# Patient Record
Sex: FEMALE | Race: WHITE | NOT HISPANIC OR LATINO | Employment: FULL TIME | URBAN - METROPOLITAN AREA
[De-identification: names, ages, dates, MRNs, and addresses within clinical notes are randomized per-mention and may not be internally consistent; named-entity substitution may affect disease eponyms.]

---

## 2019-10-26 ENCOUNTER — HOSPITAL ENCOUNTER (OUTPATIENT)
Dept: URGENT CARE | Facility: CLINIC | Age: 34
Discharge: HOME | End: 2019-10-26

## 2019-10-28 LAB — BACTERIA SPEC AEROBE CULT: NORMAL

## 2021-08-16 ENCOUNTER — OFFICE VISIT (OUTPATIENT)
Dept: INTERNAL MEDICINE | Facility: CLINIC | Age: 36
End: 2021-08-16

## 2021-08-16 VITALS
TEMPERATURE: 98 F | HEIGHT: 65 IN | BODY MASS INDEX: 48.82 KG/M2 | SYSTOLIC BLOOD PRESSURE: 126 MMHG | OXYGEN SATURATION: 98 % | HEART RATE: 71 BPM | WEIGHT: 293 LBS | DIASTOLIC BLOOD PRESSURE: 72 MMHG

## 2021-08-16 DIAGNOSIS — Z13.220 SCREENING, LIPID: ICD-10-CM

## 2021-08-16 DIAGNOSIS — R73.01 IMPAIRED FASTING GLUCOSE: ICD-10-CM

## 2021-08-16 DIAGNOSIS — R06.02 SHORTNESS OF BREATH: ICD-10-CM

## 2021-08-16 DIAGNOSIS — Z12.4 SCREENING FOR CERVICAL CANCER: ICD-10-CM

## 2021-08-16 DIAGNOSIS — Z76.89 ESTABLISHING CARE WITH NEW DOCTOR, ENCOUNTER FOR: Primary | ICD-10-CM

## 2021-08-16 DIAGNOSIS — E66.01 MORBID (SEVERE) OBESITY DUE TO EXCESS CALORIES (HCC): ICD-10-CM

## 2021-08-16 LAB
25(OH)D3 SERPL-MCNC: 31 NG/ML
ALBUMIN SERPL-MCNC: 4.4 G/DL (ref 3.5–5.2)
ALBUMIN/GLOB SERPL: 1.5 G/DL
ALP SERPL-CCNC: 77 U/L (ref 39–117)
ALT SERPL W P-5'-P-CCNC: 14 U/L (ref 1–33)
ANION GAP SERPL CALCULATED.3IONS-SCNC: 9.7 MMOL/L (ref 5–15)
AST SERPL-CCNC: 14 U/L (ref 1–32)
BASOPHILS # BLD AUTO: 0.07 10*3/MM3 (ref 0–0.2)
BASOPHILS NFR BLD AUTO: 0.8 % (ref 0–1.5)
BILIRUB SERPL-MCNC: <0.2 MG/DL (ref 0–1.2)
BUN SERPL-MCNC: 14 MG/DL (ref 6–20)
BUN/CREAT SERPL: 32.6 (ref 7–25)
CALCIUM SPEC-SCNC: 9.4 MG/DL (ref 8.6–10.5)
CHLORIDE SERPL-SCNC: 100 MMOL/L (ref 98–107)
CHOLEST SERPL-MCNC: 194 MG/DL (ref 0–200)
CO2 SERPL-SCNC: 23.3 MMOL/L (ref 22–29)
CREAT SERPL-MCNC: 0.43 MG/DL (ref 0.57–1)
DEPRECATED RDW RBC AUTO: 40.6 FL (ref 37–54)
EOSINOPHIL # BLD AUTO: 0.32 10*3/MM3 (ref 0–0.4)
EOSINOPHIL NFR BLD AUTO: 3.6 % (ref 0.3–6.2)
ERYTHROCYTE [DISTWIDTH] IN BLOOD BY AUTOMATED COUNT: 13 % (ref 12.3–15.4)
GFR SERPL CREATININE-BSD FRML MDRD: >150 ML/MIN/1.73
GLOBULIN UR ELPH-MCNC: 3 GM/DL
GLUCOSE SERPL-MCNC: 81 MG/DL (ref 65–99)
HBA1C MFR BLD: 5.59 % (ref 4.8–5.6)
HCT VFR BLD AUTO: 42.3 % (ref 34–46.6)
HDLC SERPL-MCNC: 52 MG/DL (ref 40–60)
HGB BLD-MCNC: 13.9 G/DL (ref 12–15.9)
IMM GRANULOCYTES # BLD AUTO: 0.02 10*3/MM3 (ref 0–0.05)
IMM GRANULOCYTES NFR BLD AUTO: 0.2 % (ref 0–0.5)
LDLC SERPL CALC-MCNC: 124 MG/DL (ref 0–100)
LDLC/HDLC SERPL: 2.35 {RATIO}
LYMPHOCYTES # BLD AUTO: 2.77 10*3/MM3 (ref 0.7–3.1)
LYMPHOCYTES NFR BLD AUTO: 31.2 % (ref 19.6–45.3)
MCH RBC QN AUTO: 28 PG (ref 26.6–33)
MCHC RBC AUTO-ENTMCNC: 32.9 G/DL (ref 31.5–35.7)
MCV RBC AUTO: 85.3 FL (ref 79–97)
MONOCYTES # BLD AUTO: 0.78 10*3/MM3 (ref 0.1–0.9)
MONOCYTES NFR BLD AUTO: 8.8 % (ref 5–12)
NEUTROPHILS NFR BLD AUTO: 4.93 10*3/MM3 (ref 1.7–7)
NEUTROPHILS NFR BLD AUTO: 55.4 % (ref 42.7–76)
NRBC BLD AUTO-RTO: 0 /100 WBC (ref 0–0.2)
PLATELET # BLD AUTO: 341 10*3/MM3 (ref 140–450)
PMV BLD AUTO: 10.2 FL (ref 6–12)
POTASSIUM SERPL-SCNC: 4.2 MMOL/L (ref 3.5–5.2)
PROT SERPL-MCNC: 7.4 G/DL (ref 6–8.5)
RBC # BLD AUTO: 4.96 10*6/MM3 (ref 3.77–5.28)
SODIUM SERPL-SCNC: 133 MMOL/L (ref 136–145)
T4 FREE SERPL-MCNC: 0.98 NG/DL (ref 0.93–1.7)
TRIGL SERPL-MCNC: 99 MG/DL (ref 0–150)
TSH SERPL DL<=0.05 MIU/L-ACNC: 1.67 UIU/ML (ref 0.27–4.2)
VLDLC SERPL-MCNC: 18 MG/DL (ref 5–40)
WBC # BLD AUTO: 8.89 10*3/MM3 (ref 3.4–10.8)

## 2021-08-16 PROCEDURE — 83036 HEMOGLOBIN GLYCOSYLATED A1C: CPT | Performed by: INTERNAL MEDICINE

## 2021-08-16 PROCEDURE — 80053 COMPREHEN METABOLIC PANEL: CPT | Performed by: INTERNAL MEDICINE

## 2021-08-16 PROCEDURE — 99204 OFFICE O/P NEW MOD 45 MIN: CPT | Performed by: INTERNAL MEDICINE

## 2021-08-16 PROCEDURE — 84439 ASSAY OF FREE THYROXINE: CPT | Performed by: INTERNAL MEDICINE

## 2021-08-16 PROCEDURE — 80061 LIPID PANEL: CPT | Performed by: INTERNAL MEDICINE

## 2021-08-16 PROCEDURE — 82306 VITAMIN D 25 HYDROXY: CPT | Performed by: INTERNAL MEDICINE

## 2021-08-16 PROCEDURE — 85025 COMPLETE CBC W/AUTO DIFF WBC: CPT | Performed by: INTERNAL MEDICINE

## 2021-08-16 PROCEDURE — 84443 ASSAY THYROID STIM HORMONE: CPT | Performed by: INTERNAL MEDICINE

## 2021-08-16 RX ORDER — FEXOFENADINE HCL 180 MG/1
180 TABLET ORAL DAILY
Qty: 90 TABLET | Refills: 0 | Status: SHIPPED | OUTPATIENT
Start: 2021-08-16

## 2021-08-16 RX ORDER — CETIRIZINE HYDROCHLORIDE 10 MG/1
10 TABLET ORAL DAILY
COMMUNITY
End: 2021-08-16

## 2021-08-16 RX ORDER — CYCLOBENZAPRINE HCL 10 MG
10 TABLET ORAL AS NEEDED
COMMUNITY
End: 2022-05-31 | Stop reason: SDUPTHER

## 2021-08-16 RX ORDER — KETOROLAC TROMETHAMINE 10 MG/1
10 TABLET, FILM COATED ORAL AS NEEDED
COMMUNITY
End: 2022-05-31 | Stop reason: SDUPTHER

## 2021-08-17 LAB — CORTIS AM PEAK SERPL-MCNC: 9.86 MCG/DL (ref 6.02–18.4)

## 2021-08-17 PROCEDURE — 82533 TOTAL CORTISOL: CPT | Performed by: INTERNAL MEDICINE

## 2021-08-24 RX ORDER — DOXYCYCLINE HYCLATE 100 MG/1
100 CAPSULE ORAL 2 TIMES DAILY
Qty: 20 CAPSULE | Refills: 0 | Status: SHIPPED | OUTPATIENT
Start: 2021-08-24 | End: 2022-05-31

## 2021-08-24 NOTE — TELEPHONE ENCOUNTER
Doxycycline 100mg PO BID x 10 days per Dr. Durand related to x-ray.  Also wants to try Saxenda. Please advise on Saxenda dosing please.

## 2021-08-30 ENCOUNTER — TELEPHONE (OUTPATIENT)
Dept: INTERNAL MEDICINE | Facility: CLINIC | Age: 36
End: 2021-08-30

## 2021-08-31 ENCOUNTER — TELEPHONE (OUTPATIENT)
Dept: INTERNAL MEDICINE | Facility: CLINIC | Age: 36
End: 2021-08-31

## 2021-09-01 NOTE — TELEPHONE ENCOUNTER
Patient called back and had rash and itching and her body felt very hot and flush and she turned red and headache. The ED took her off this med    She took Benadryl 25mg and the symptoms worsened and then she went to the ED. They stopped the meds and she wanted to know if she needed an antibioitc to finish this course. Please advise.

## 2022-05-31 ENCOUNTER — OFFICE VISIT (OUTPATIENT)
Dept: INTERNAL MEDICINE | Facility: CLINIC | Age: 37
End: 2022-05-31

## 2022-05-31 ENCOUNTER — TELEPHONE (OUTPATIENT)
Dept: INTERNAL MEDICINE | Facility: CLINIC | Age: 37
End: 2022-05-31

## 2022-05-31 VITALS
HEART RATE: 81 BPM | WEIGHT: 293 LBS | SYSTOLIC BLOOD PRESSURE: 122 MMHG | BODY MASS INDEX: 48.82 KG/M2 | RESPIRATION RATE: 18 BRPM | HEIGHT: 65 IN | OXYGEN SATURATION: 97 % | TEMPERATURE: 98.6 F | DIASTOLIC BLOOD PRESSURE: 82 MMHG

## 2022-05-31 DIAGNOSIS — R63.5 WEIGHT GAIN: ICD-10-CM

## 2022-05-31 DIAGNOSIS — E66.01 CLASS 3 SEVERE OBESITY DUE TO EXCESS CALORIES WITHOUT SERIOUS COMORBIDITY WITH BODY MASS INDEX (BMI) OF 50.0 TO 59.9 IN ADULT: Primary | ICD-10-CM

## 2022-05-31 DIAGNOSIS — R53.83 FATIGUE, UNSPECIFIED TYPE: ICD-10-CM

## 2022-05-31 LAB
BASOPHILS # BLD AUTO: 0.07 10*3/MM3 (ref 0–0.2)
BASOPHILS NFR BLD AUTO: 0.6 % (ref 0–1.5)
DEPRECATED RDW RBC AUTO: 38.4 FL (ref 37–54)
EOSINOPHIL # BLD AUTO: 0.32 10*3/MM3 (ref 0–0.4)
EOSINOPHIL NFR BLD AUTO: 2.6 % (ref 0.3–6.2)
ERYTHROCYTE [DISTWIDTH] IN BLOOD BY AUTOMATED COUNT: 12.8 % (ref 12.3–15.4)
HBA1C MFR BLD: 6 % (ref 4.8–5.6)
HCT VFR BLD AUTO: 40.4 % (ref 34–46.6)
HGB BLD-MCNC: 13 G/DL (ref 12–15.9)
IMM GRANULOCYTES # BLD AUTO: 0.06 10*3/MM3 (ref 0–0.05)
IMM GRANULOCYTES NFR BLD AUTO: 0.5 % (ref 0–0.5)
LYMPHOCYTES # BLD AUTO: 2.94 10*3/MM3 (ref 0.7–3.1)
LYMPHOCYTES NFR BLD AUTO: 24 % (ref 19.6–45.3)
MCH RBC QN AUTO: 26.6 PG (ref 26.6–33)
MCHC RBC AUTO-ENTMCNC: 32.2 G/DL (ref 31.5–35.7)
MCV RBC AUTO: 82.8 FL (ref 79–97)
MONOCYTES # BLD AUTO: 0.99 10*3/MM3 (ref 0.1–0.9)
MONOCYTES NFR BLD AUTO: 8.1 % (ref 5–12)
NEUTROPHILS NFR BLD AUTO: 64.2 % (ref 42.7–76)
NEUTROPHILS NFR BLD AUTO: 7.86 10*3/MM3 (ref 1.7–7)
NRBC BLD AUTO-RTO: 0 /100 WBC (ref 0–0.2)
PLATELET # BLD AUTO: 336 10*3/MM3 (ref 140–450)
PMV BLD AUTO: 10.1 FL (ref 6–12)
RBC # BLD AUTO: 4.88 10*6/MM3 (ref 3.77–5.28)
WBC NRBC COR # BLD: 12.24 10*3/MM3 (ref 3.4–10.8)

## 2022-05-31 PROCEDURE — 82306 VITAMIN D 25 HYDROXY: CPT | Performed by: NURSE PRACTITIONER

## 2022-05-31 PROCEDURE — 80061 LIPID PANEL: CPT | Performed by: NURSE PRACTITIONER

## 2022-05-31 PROCEDURE — 80050 GENERAL HEALTH PANEL: CPT | Performed by: NURSE PRACTITIONER

## 2022-05-31 PROCEDURE — 99214 OFFICE O/P EST MOD 30 MIN: CPT | Performed by: NURSE PRACTITIONER

## 2022-05-31 PROCEDURE — 84439 ASSAY OF FREE THYROXINE: CPT | Performed by: NURSE PRACTITIONER

## 2022-05-31 PROCEDURE — 83036 HEMOGLOBIN GLYCOSYLATED A1C: CPT | Performed by: NURSE PRACTITIONER

## 2022-05-31 PROCEDURE — 82533 TOTAL CORTISOL: CPT | Performed by: NURSE PRACTITIONER

## 2022-05-31 RX ORDER — KETOROLAC TROMETHAMINE 10 MG/1
10 TABLET, FILM COATED ORAL AS NEEDED
Qty: 20 TABLET | Refills: 0 | Status: SHIPPED | OUTPATIENT
Start: 2022-05-31 | End: 2022-05-31

## 2022-05-31 RX ORDER — CYCLOBENZAPRINE HCL 10 MG
10 TABLET ORAL 3 TIMES DAILY PRN
Qty: 20 TABLET | Refills: 0 | OUTPATIENT
Start: 2022-05-31 | End: 2023-01-30 | Stop reason: SDUPTHER

## 2022-05-31 RX ORDER — CYCLOBENZAPRINE HCL 10 MG
10 TABLET ORAL AS NEEDED
Qty: 20 TABLET | Refills: 0 | Status: SHIPPED | OUTPATIENT
Start: 2022-05-31 | End: 2022-05-31

## 2022-06-01 PROBLEM — E66.813 CLASS 3 SEVERE OBESITY DUE TO EXCESS CALORIES WITHOUT SERIOUS COMORBIDITY WITH BODY MASS INDEX (BMI) OF 50.0 TO 59.9 IN ADULT: Status: ACTIVE | Noted: 2022-06-01

## 2022-06-01 PROBLEM — E66.01 CLASS 3 SEVERE OBESITY DUE TO EXCESS CALORIES WITHOUT SERIOUS COMORBIDITY WITH BODY MASS INDEX (BMI) OF 50.0 TO 59.9 IN ADULT: Status: ACTIVE | Noted: 2022-06-01

## 2022-06-01 LAB
25(OH)D3 SERPL-MCNC: 26.2 NG/ML (ref 30–100)
ALBUMIN SERPL-MCNC: 4.6 G/DL (ref 3.5–5.2)
ALBUMIN/GLOB SERPL: 1.8 G/DL
ALP SERPL-CCNC: 88 U/L (ref 39–117)
ALT SERPL W P-5'-P-CCNC: 18 U/L (ref 1–33)
ANION GAP SERPL CALCULATED.3IONS-SCNC: 14.4 MMOL/L (ref 5–15)
AST SERPL-CCNC: 20 U/L (ref 1–32)
BILIRUB SERPL-MCNC: <0.2 MG/DL (ref 0–1.2)
BUN SERPL-MCNC: 18 MG/DL (ref 6–20)
BUN/CREAT SERPL: 30 (ref 7–25)
CALCIUM SPEC-SCNC: 9.6 MG/DL (ref 8.6–10.5)
CHLORIDE SERPL-SCNC: 100 MMOL/L (ref 98–107)
CHOLEST SERPL-MCNC: 175 MG/DL (ref 0–200)
CO2 SERPL-SCNC: 22.6 MMOL/L (ref 22–29)
CORTIS SERPL-MCNC: 5.09 MCG/DL
CREAT SERPL-MCNC: 0.6 MG/DL (ref 0.57–1)
EGFRCR SERPLBLD CKD-EPI 2021: 120.2 ML/MIN/1.73
GLOBULIN UR ELPH-MCNC: 2.6 GM/DL
GLUCOSE SERPL-MCNC: 83 MG/DL (ref 65–99)
HDLC SERPL-MCNC: 40 MG/DL (ref 40–60)
LDLC SERPL CALC-MCNC: 105 MG/DL (ref 0–100)
LDLC/HDLC SERPL: 2.54 {RATIO}
POTASSIUM SERPL-SCNC: 4.1 MMOL/L (ref 3.5–5.2)
PROT SERPL-MCNC: 7.2 G/DL (ref 6–8.5)
SODIUM SERPL-SCNC: 137 MMOL/L (ref 136–145)
T4 FREE SERPL-MCNC: 1.02 NG/DL (ref 0.93–1.7)
TRIGL SERPL-MCNC: 168 MG/DL (ref 0–150)
TSH SERPL DL<=0.05 MIU/L-ACNC: 1.96 UIU/ML (ref 0.27–4.2)
VLDLC SERPL-MCNC: 30 MG/DL (ref 5–40)

## 2022-06-01 NOTE — ASSESSMENT & PLAN NOTE
We spent ample amount of time discussing this topic, patient is willing to make some changes at this time.  We discussed Saxenda, we will start that back and use a very slow upward titration to the goal dose.  She will start with every other day and increase every 2 weeks as tolerated.  If she is unable to tolerate she will let us know, we can look at other options at that point.  She is going to work on an exercise routine, we did discuss one of the gyms locally, they do offer free training help, we discussed doing primarily cardio early on, she can do some weight resistance training but cardio ultimately would be the most important at this point.  We discussed different diet techniques, she is going to look at one that is more sustainable for the long-term.  We discussed that healthy weight loss is about 1 pound per week equaling 52 pounds a year, so that this is very much a slow and steady weight loss versus an abrupt weight loss which can be harder on her body.  We will look at labs again in see if there is any concerning results.  Patient agrees and understands the plan.  We will want to follow-up closely in 3 months, and help her with this journey.  We also discussed joining a weight loss support group, which would be very beneficial to her.  She does not have good support within her home, her fiancé is not supportive of her weight loss journey in her opinion.

## 2022-08-18 ENCOUNTER — HOSPITAL ENCOUNTER (EMERGENCY)
Facility: HOSPITAL | Age: 37
Discharge: HOME | End: 2022-08-18
Attending: EMERGENCY MEDICINE | Admitting: EMERGENCY MEDICINE

## 2022-08-18 VITALS
TEMPERATURE: 98.3 F | DIASTOLIC BLOOD PRESSURE: 87 MMHG | SYSTOLIC BLOOD PRESSURE: 129 MMHG | BODY MASS INDEX: 48.82 KG/M2 | HEIGHT: 65 IN | HEART RATE: 82 BPM | RESPIRATION RATE: 16 BRPM | OXYGEN SATURATION: 97 % | WEIGHT: 293 LBS

## 2022-08-18 DIAGNOSIS — R55 SYNCOPE, UNSPECIFIED SYNCOPE TYPE: Primary | ICD-10-CM

## 2022-08-18 LAB
ALBUMIN SERPL-MCNC: 4.2 G/DL (ref 3.5–5.2)
ALBUMIN/GLOB SERPL: 1.5 G/DL
ALP SERPL-CCNC: 82 U/L (ref 39–117)
ALT SERPL W P-5'-P-CCNC: 15 U/L (ref 1–33)
ANION GAP SERPL CALCULATED.3IONS-SCNC: 13.2 MMOL/L (ref 5–15)
AST SERPL-CCNC: 16 U/L (ref 1–32)
BASOPHILS # BLD AUTO: 0.03 10*3/MM3 (ref 0–0.2)
BASOPHILS NFR BLD AUTO: 0.2 % (ref 0–1.5)
BILIRUB SERPL-MCNC: 0.2 MG/DL (ref 0–1.2)
BILIRUB UR QL STRIP: NEGATIVE
BUN SERPL-MCNC: 19 MG/DL (ref 6–20)
BUN/CREAT SERPL: 25.3 (ref 7–25)
CALCIUM SPEC-SCNC: 9.3 MG/DL (ref 8.6–10.5)
CHLORIDE SERPL-SCNC: 103 MMOL/L (ref 98–107)
CLARITY UR: CLEAR
CO2 SERPL-SCNC: 24.8 MMOL/L (ref 22–29)
COLOR UR: YELLOW
CREAT SERPL-MCNC: 0.75 MG/DL (ref 0.57–1)
DEPRECATED RDW RBC AUTO: 44.8 FL (ref 37–54)
EGFRCR SERPLBLD CKD-EPI 2021: 106.6 ML/MIN/1.73
EOSINOPHIL # BLD AUTO: 0.1 10*3/MM3 (ref 0–0.4)
EOSINOPHIL NFR BLD AUTO: 0.7 % (ref 0.3–6.2)
ERYTHROCYTE [DISTWIDTH] IN BLOOD BY AUTOMATED COUNT: 14.7 % (ref 12.3–15.4)
GLOBULIN UR ELPH-MCNC: 2.8 GM/DL
GLUCOSE SERPL-MCNC: 112 MG/DL (ref 65–99)
GLUCOSE UR STRIP-MCNC: NEGATIVE MG/DL
HCT VFR BLD AUTO: 37.4 % (ref 34–46.6)
HGB BLD-MCNC: 12.2 G/DL (ref 12–15.9)
HGB UR QL STRIP.AUTO: NEGATIVE
HOLD SPECIMEN: NORMAL
HOLD SPECIMEN: NORMAL
IMM GRANULOCYTES # BLD AUTO: 0.07 10*3/MM3 (ref 0–0.05)
IMM GRANULOCYTES NFR BLD AUTO: 0.5 % (ref 0–0.5)
KETONES UR QL STRIP: NEGATIVE
LEUKOCYTE ESTERASE UR QL STRIP.AUTO: NEGATIVE
LYMPHOCYTES # BLD AUTO: 2.36 10*3/MM3 (ref 0.7–3.1)
LYMPHOCYTES NFR BLD AUTO: 16.5 % (ref 19.6–45.3)
MAGNESIUM SERPL-MCNC: 2 MG/DL (ref 1.6–2.6)
MCH RBC QN AUTO: 27.3 PG (ref 26.6–33)
MCHC RBC AUTO-ENTMCNC: 32.6 G/DL (ref 31.5–35.7)
MCV RBC AUTO: 83.7 FL (ref 79–97)
MONOCYTES # BLD AUTO: 0.95 10*3/MM3 (ref 0.1–0.9)
MONOCYTES NFR BLD AUTO: 6.7 % (ref 5–12)
NEUTROPHILS NFR BLD AUTO: 10.76 10*3/MM3 (ref 1.7–7)
NEUTROPHILS NFR BLD AUTO: 75.4 % (ref 42.7–76)
NITRITE UR QL STRIP: NEGATIVE
NRBC BLD AUTO-RTO: 0 /100 WBC (ref 0–0.2)
PH UR STRIP.AUTO: 6 [PH] (ref 5–8)
PLATELET # BLD AUTO: 302 10*3/MM3 (ref 140–450)
PMV BLD AUTO: 9.8 FL (ref 6–12)
POTASSIUM SERPL-SCNC: 3.5 MMOL/L (ref 3.5–5.2)
PROT SERPL-MCNC: 7 G/DL (ref 6–8.5)
PROT UR QL STRIP: NEGATIVE
RBC # BLD AUTO: 4.47 10*6/MM3 (ref 3.77–5.28)
SODIUM SERPL-SCNC: 141 MMOL/L (ref 136–145)
SP GR UR STRIP: 1.02 (ref 1–1.03)
TROPONIN T SERPL-MCNC: <0.01 NG/ML (ref 0–0.03)
UROBILINOGEN UR QL STRIP: NORMAL
WBC NRBC COR # BLD: 14.27 10*3/MM3 (ref 3.4–10.8)
WHOLE BLOOD HOLD COAG: NORMAL
WHOLE BLOOD HOLD SPECIMEN: NORMAL

## 2022-08-18 PROCEDURE — 84484 ASSAY OF TROPONIN QUANT: CPT | Performed by: EMERGENCY MEDICINE

## 2022-08-18 PROCEDURE — 93010 ELECTROCARDIOGRAM REPORT: CPT | Performed by: INTERNAL MEDICINE

## 2022-08-18 PROCEDURE — 99283 EMERGENCY DEPT VISIT LOW MDM: CPT

## 2022-08-18 PROCEDURE — 36415 COLL VENOUS BLD VENIPUNCTURE: CPT

## 2022-08-18 PROCEDURE — 81003 URINALYSIS AUTO W/O SCOPE: CPT | Performed by: EMERGENCY MEDICINE

## 2022-08-18 PROCEDURE — 93005 ELECTROCARDIOGRAM TRACING: CPT | Performed by: EMERGENCY MEDICINE

## 2022-08-18 PROCEDURE — 85025 COMPLETE CBC W/AUTO DIFF WBC: CPT | Performed by: EMERGENCY MEDICINE

## 2022-08-18 PROCEDURE — 83735 ASSAY OF MAGNESIUM: CPT | Performed by: EMERGENCY MEDICINE

## 2022-08-18 PROCEDURE — 80053 COMPREHEN METABOLIC PANEL: CPT | Performed by: EMERGENCY MEDICINE

## 2022-08-18 RX ORDER — SODIUM CHLORIDE 0.9 % (FLUSH) 0.9 %
10 SYRINGE (ML) INJECTION AS NEEDED
Status: DISCONTINUED | OUTPATIENT
Start: 2022-08-18 | End: 2022-08-18 | Stop reason: HOSPADM

## 2022-08-18 NOTE — ED PROVIDER NOTES
"Time: 5:19 PM EDT  Arrived by: ambulance  Chief Complaint: syncope  History provided by: pt  History is limited by: N/A     History of Present Illness:  Patient is a 36 y.o. year old female who presents to the emergency department with syncope. Pt was driving alone when she broke out into a sweat, felt nauseous, had chest pain and pressure, heart palpitations, dry mouth, lightheadedness, tingling, and visual disturbances that felt like \"someone shined a flashlight\" in her eyes. She states that she also had head pressure, states this did not feel like a headache. Pt was able to pull over to a side road where she passed out. Pt states that woke up in ambulance to EMT rubbing her chest. She thinks she was out for a few minutes. Pt has sinus problems that she takes benadryl for. She denies pain or burning on urination, but has noticed her urine has been darker than normal. She states she had previous syncopal episode 6 months ago. Pt is a former smoker but says she quit two years ago.    HPI    Similar Symptoms Previously: yes  Recently seen: no      Patient Care Team  Primary Care Provider: Hanna Pereira MD    Past Medical History:     Allergies   Allergen Reactions   • Morphine Hallucinations   • Adhesive Tape Rash   • Latex Rash     Past Medical History:   Diagnosis Date   • Heart murmur    • Myocardial infarction (HCC) 03/2010     No past surgical history on file.  No family history on file.    Home Medications:  Prior to Admission medications    Medication Sig Start Date End Date Taking? Authorizing Provider   cyclobenzaprine (FLEXERIL) 10 MG tablet Take 1 tablet by mouth 3 (Three) Times a Day As Needed for Muscle Spasms. 5/31/22   Hanna Pereira MD   fexofenadine (Allegra Allergy) 180 MG tablet Take 1 tablet by mouth Daily. 8/16/21   Karen Durand MD   Liraglutide (SAXENDA) 18 MG/3ML injection pen Inject 0.6mg under skin daily for week one, THEN 1.2mg daily for week two, THEN 1.8mg " "daily for week three, then 2.4mg daily for week four. 21   Karen Durand MD        Social History:   Social History     Tobacco Use   • Smoking status: Former Smoker     Packs/day: 0.50     Years: 17.00     Pack years: 8.50     Types: Cigarettes     Quit date:      Years since quittin.6   • Smokeless tobacco: Never Used   Vaping Use   • Vaping Use: Never used   Substance Use Topics   • Alcohol use: Not Currently     Comment: Drinks maybe once a year     Recent travel: no     Review of Systems:  Review of Systems   Constitutional: Positive for diaphoresis. Negative for chills and fever.   HENT: Negative for congestion, rhinorrhea and sore throat.    Eyes: Positive for photophobia and visual disturbance.   Respiratory: Negative for apnea, cough, chest tightness and shortness of breath.    Cardiovascular: Positive for chest pain and palpitations.   Gastrointestinal: Positive for nausea. Negative for abdominal pain, diarrhea and vomiting.   Endocrine: Negative.    Genitourinary: Negative for difficulty urinating and dysuria.   Musculoskeletal: Negative for back pain, joint swelling and myalgias.   Skin: Negative for color change and wound.   Allergic/Immunologic: Negative.    Neurological: Positive for syncope and light-headedness. Negative for seizures and headaches.        Pressure in head   Psychiatric/Behavioral: Negative.    All other systems reviewed and are negative.       Physical Exam:  /87 (Patient Position: Standing)   Pulse 82   Temp 98.3 °F (36.8 °C) (Oral)   Resp 16   Ht 165.1 cm (65\")   Wt (!) 141 kg (311 lb 8.2 oz)   LMP 2022 (Approximate)   SpO2 97%   BMI 51.84 kg/m²     Physical Exam  Vitals and nursing note reviewed.   Constitutional:       General: She is awake.      Appearance: Normal appearance.   HENT:      Head: Normocephalic and atraumatic.      Nose: Nose normal.      Mouth/Throat:      Mouth: Mucous membranes are moist.   Eyes:      Extraocular Movements: " Extraocular movements intact.      Pupils: Pupils are equal, round, and reactive to light.   Cardiovascular:      Rate and Rhythm: Normal rate and regular rhythm.      Heart sounds: Normal heart sounds.   Pulmonary:      Effort: Pulmonary effort is normal. No respiratory distress.      Breath sounds: Normal breath sounds. No wheezing, rhonchi or rales.   Abdominal:      General: Bowel sounds are normal.      Palpations: Abdomen is soft.      Tenderness: There is no abdominal tenderness. There is no guarding or rebound.      Comments: No rigidity   Musculoskeletal:         General: No tenderness. Normal range of motion.      Cervical back: Normal range of motion and neck supple.   Skin:     General: Skin is warm and dry.      Coloration: Skin is not jaundiced.   Neurological:      General: No focal deficit present.      Mental Status: She is alert and oriented to person, place, and time. Mental status is at baseline.      Sensory: Sensation is intact.      Motor: Motor function is intact.      Coordination: Coordination is intact.   Psychiatric:         Attention and Perception: Attention and perception normal.         Mood and Affect: Mood and affect normal.         Speech: Speech normal.         Behavior: Behavior normal.         Judgment: Judgment normal.      Comments: Mildly anxious                Medications in the Emergency Department:  Medications   sodium chloride 0.9 % flush 10 mL (has no administration in time range)        Labs  Lab Results (last 24 hours)     Procedure Component Value Units Date/Time    CBC & Differential [[id number]]  (Abnormal) Collected: 08/18/22 1715    Specimen: Blood Updated: 08/18/22 1726    Narrative:      The following orders were created for panel order CBC & Differential.  Procedure                               Abnormality         Status                     ---------                               -----------         ------                     CBC Auto Differential[[id number]]         Abnormal            Final result                 Please view results for these tests on the individual orders.    Comprehensive Metabolic Panel [[id number]]  (Abnormal) Collected: 08/18/22 1715    Specimen: Blood Updated: 08/18/22 1749     Glucose 112 mg/dL      BUN 19 mg/dL      Creatinine 0.75 mg/dL      Sodium 141 mmol/L      Potassium 3.5 mmol/L      Chloride 103 mmol/L      CO2 24.8 mmol/L      Calcium 9.3 mg/dL      Total Protein 7.0 g/dL      Albumin 4.20 g/dL      ALT (SGPT) 15 U/L      AST (SGOT) 16 U/L      Alkaline Phosphatase 82 U/L      Total Bilirubin 0.2 mg/dL      Globulin 2.8 gm/dL      A/G Ratio 1.5 g/dL      BUN/Creatinine Ratio 25.3     Anion Gap 13.2 mmol/L      eGFR 106.6 mL/min/1.73      Comment: National Kidney Foundation and American Society of Nephrology (ASN) Task Force recommended calculation based on the Chronic Kidney Disease Epidemiology Collaboration (CKD-EPI) equation refit without adjustment for race.       Narrative:      GFR Normal >60  Chronic Kidney Disease <60  Kidney Failure <15      Magnesium [[id number]]  (Normal) Collected: 08/18/22 1715    Specimen: Blood Updated: 08/18/22 1749     Magnesium 2.0 mg/dL     Troponin [[id number]]  (Normal) Collected: 08/18/22 1715    Specimen: Blood Updated: 08/18/22 1749     Troponin T <0.010 ng/mL     Narrative:      Troponin T Reference Range:  <= 0.03 ng/mL-   Negative for AMI  >0.03 ng/mL-     Abnormal for myocardial necrosis.  Clinicians would have to utilize clinical acumen, EKG, Troponin and serial changes to determine if it is an Acute Myocardial Infarction or myocardial injury due to an underlying chronic condition.       Results may be falsely decreased if patient taking Biotin.      CBC Auto Differential [[id number]]  (Abnormal) Collected: 08/18/22 1715    Specimen: Blood Updated: 08/18/22 1726     WBC 14.27 10*3/mm3      RBC 4.47 10*6/mm3      Hemoglobin 12.2 g/dL      Hematocrit 37.4 %      MCV 83.7 fL      MCH 27.3 pg       MCHC 32.6 g/dL      RDW 14.7 %      RDW-SD 44.8 fl      MPV 9.8 fL      Platelets 302 10*3/mm3      Neutrophil % 75.4 %      Lymphocyte % 16.5 %      Monocyte % 6.7 %      Eosinophil % 0.7 %      Basophil % 0.2 %      Immature Grans % 0.5 %      Neutrophils, Absolute 10.76 10*3/mm3      Lymphocytes, Absolute 2.36 10*3/mm3      Monocytes, Absolute 0.95 10*3/mm3      Eosinophils, Absolute 0.10 10*3/mm3      Basophils, Absolute 0.03 10*3/mm3      Immature Grans, Absolute 0.07 10*3/mm3      nRBC 0.0 /100 WBC     Urinalysis With Culture If Indicated - Urine, Clean Catch [[id number]]  (Normal) Collected: 08/18/22 1752    Specimen: Urine, Clean Catch Updated: 08/18/22 1804     Color, UA Yellow     Appearance, UA Clear     pH, UA 6.0     Specific Gravity, UA 1.023     Glucose, UA Negative     Ketones, UA Negative     Bilirubin, UA Negative     Blood, UA Negative     Protein, UA Negative     Leuk Esterase, UA Negative     Nitrite, UA Negative     Urobilinogen, UA 1.0 E.U./dL    Narrative:      In absence of clinical symptoms, the presence of pyuria, bacteria, and/or nitrites on the urinalysis result does not correlate with infection.  Urine microscopic not indicated.           Imaging:  No Radiology Exams Resulted Within Past 24 Hours    Procedures:  Procedures    Progress  ED Course as of 08/18/22 1833   Thu Aug 18, 2022   1829 EKG interpretation: Normal sinus rhythm, heart 76, normal W[place] and QT intervals, normal QRS duration, normal axis, no acute ischemia. [RP]      ED Course User Index  [RP] Alvin Campbell MD                            Medical Decision Making:  MDM  Number of Diagnoses or Management Options     Amount and/or Complexity of Data Reviewed  Independent visualization of images, tracings, or specimens: yes    Risk of Complications, Morbidity, and/or Mortality  Presenting problems: moderate  Management options: low    Patient Progress  Patient progress: improved       Final diagnoses:   Syncope, unspecified  syncope type        Disposition:  ED Disposition     ED Disposition   Discharge    Condition   Stable    Comment   --           Documentation assistance provided by Keyona Mae acting as scribe for Alvin Campbell MD. Information recorded by the scribe was done at my direction and has been verified and validated by me.     This medical record created using voice recognition software.           Keyona Mae  08/18/22 [id number]       Alvin Campbell MD  08/18/22 0849

## 2022-08-19 LAB — QT INTERVAL: 403 MS

## 2022-08-25 ENCOUNTER — OFFICE VISIT (OUTPATIENT)
Dept: INTERNAL MEDICINE | Facility: CLINIC | Age: 37
End: 2022-08-25

## 2022-08-25 VITALS
WEIGHT: 293 LBS | TEMPERATURE: 98.5 F | SYSTOLIC BLOOD PRESSURE: 100 MMHG | HEART RATE: 78 BPM | RESPIRATION RATE: 20 BRPM | DIASTOLIC BLOOD PRESSURE: 78 MMHG | HEIGHT: 65 IN | OXYGEN SATURATION: 96 % | BODY MASS INDEX: 48.82 KG/M2

## 2022-08-25 DIAGNOSIS — J32.9 SINUSITIS, UNSPECIFIED CHRONICITY, UNSPECIFIED LOCATION: ICD-10-CM

## 2022-08-25 DIAGNOSIS — R73.9 HYPERGLYCEMIA: ICD-10-CM

## 2022-08-25 DIAGNOSIS — D72.829 LEUKOCYTOSIS, UNSPECIFIED TYPE: ICD-10-CM

## 2022-08-25 DIAGNOSIS — R55 SYNCOPE, UNSPECIFIED SYNCOPE TYPE: Primary | ICD-10-CM

## 2022-08-25 DIAGNOSIS — R20.2 TINGLING: ICD-10-CM

## 2022-08-25 DIAGNOSIS — I95.9 HYPOTENSION, UNSPECIFIED HYPOTENSION TYPE: ICD-10-CM

## 2022-08-25 LAB
BASOPHILS # BLD AUTO: 0.05 10*3/MM3 (ref 0–0.2)
BASOPHILS NFR BLD AUTO: 0.5 % (ref 0–1.5)
DEPRECATED RDW RBC AUTO: 42.2 FL (ref 37–54)
EOSINOPHIL # BLD AUTO: 0.26 10*3/MM3 (ref 0–0.4)
EOSINOPHIL NFR BLD AUTO: 2.8 % (ref 0.3–6.2)
ERYTHROCYTE [DISTWIDTH] IN BLOOD BY AUTOMATED COUNT: 13.6 % (ref 12.3–15.4)
FOLATE SERPL-MCNC: 13.6 NG/ML (ref 4.78–24.2)
HBA1C MFR BLD: 5.9 % (ref 4.8–5.6)
HCT VFR BLD AUTO: 39.7 % (ref 34–46.6)
HGB BLD-MCNC: 12.6 G/DL (ref 12–15.9)
IMM GRANULOCYTES # BLD AUTO: 0.02 10*3/MM3 (ref 0–0.05)
IMM GRANULOCYTES NFR BLD AUTO: 0.2 % (ref 0–0.5)
LYMPHOCYTES # BLD AUTO: 2.53 10*3/MM3 (ref 0.7–3.1)
LYMPHOCYTES NFR BLD AUTO: 26.9 % (ref 19.6–45.3)
MCH RBC QN AUTO: 26.8 PG (ref 26.6–33)
MCHC RBC AUTO-ENTMCNC: 31.7 G/DL (ref 31.5–35.7)
MCV RBC AUTO: 84.5 FL (ref 79–97)
MONOCYTES # BLD AUTO: 0.69 10*3/MM3 (ref 0.1–0.9)
MONOCYTES NFR BLD AUTO: 7.3 % (ref 5–12)
NEUTROPHILS NFR BLD AUTO: 5.87 10*3/MM3 (ref 1.7–7)
NEUTROPHILS NFR BLD AUTO: 62.3 % (ref 42.7–76)
NRBC BLD AUTO-RTO: 0 /100 WBC (ref 0–0.2)
PLATELET # BLD AUTO: 346 10*3/MM3 (ref 140–450)
PMV BLD AUTO: 10.6 FL (ref 6–12)
RBC # BLD AUTO: 4.7 10*6/MM3 (ref 3.77–5.28)
VIT B12 BLD-MCNC: 569 PG/ML (ref 211–946)
WBC NRBC COR # BLD: 9.42 10*3/MM3 (ref 3.4–10.8)

## 2022-08-25 PROCEDURE — 99214 OFFICE O/P EST MOD 30 MIN: CPT | Performed by: PHYSICIAN ASSISTANT

## 2022-08-25 PROCEDURE — 82746 ASSAY OF FOLIC ACID SERUM: CPT | Performed by: PHYSICIAN ASSISTANT

## 2022-08-25 PROCEDURE — 85025 COMPLETE CBC W/AUTO DIFF WBC: CPT | Performed by: PHYSICIAN ASSISTANT

## 2022-08-25 PROCEDURE — 82607 VITAMIN B-12: CPT | Performed by: PHYSICIAN ASSISTANT

## 2022-08-25 PROCEDURE — 83036 HEMOGLOBIN GLYCOSYLATED A1C: CPT | Performed by: PHYSICIAN ASSISTANT

## 2022-08-25 NOTE — PROGRESS NOTES
Chief Complaint  Nausea (sy), Loss of Consciousness, and Headache    Subjective          Jessie Romero presents to [facility]  Pt went to ER 8/18. Pt was driving and felt nausea, chest pain, heart racing, dry mouth, lightheaded, vision changes (bright lights in your eyes), sweating, headache. She pulled over the car and passed out. States she lost consciousness. She called EMS. Previous syncopal episode was 6 months prior. EKG WNL.     UA WNL. Mg, troponin wnl. CMP- elevated bg 112. WBC slightly elevated.   Pt states she has had sinus sx recently. Nasal congestion, facial pressure. States she gets this every year. Denies fever.    Pt states she has not had syncope since ER visit.  Pt drinks 1 gallon of water daily.   Pt drinks 4 cups of coffee/day. Sometimes she drinks more that than.  Pt states she stopped saxenda 1 month ago.  She admits to tingling in bilateral hands that comes and goes.  Denies neck injury or pain.    Pt admits to recent anxiety  She has stress at home, worries about the person she lives with, vehicles are not reliable.   Admits to occasional depression   Denies si/hi.   Does not want to try medicine at this time.    Past Medical History:   Diagnosis Date   • Heart murmur    • Myocardial infarction (HCC) 03/2010        History reviewed. No pertinent surgical history.     Current Outpatient Medications on File Prior to Visit   Medication Sig Dispense Refill   • cyclobenzaprine (FLEXERIL) 10 MG tablet Take 1 tablet by mouth 3 (Three) Times a Day As Needed for Muscle Spasms. 20 tablet 0   • fexofenadine (Allegra Allergy) 180 MG tablet Take 1 tablet by mouth Daily. 90 tablet 0   • Liraglutide (SAXENDA) 18 MG/3ML injection pen Inject 0.6mg under skin daily for week one, THEN 1.2mg daily for week two, THEN 1.8mg daily for week three, then 2.4mg daily for week four. 3 pen 0     No current facility-administered medications on file prior to visit.  "       Allergies   Allergen Reactions   • Morphine Hallucinations   • Adhesive Tape Rash   • Latex Rash       Social History     Tobacco Use   Smoking Status Former Smoker   • Packs/day: 0.50   • Years: 17.00   • Pack years: 8.50   • Types: Cigarettes   • Quit date:    • Years since quittin.6   Smokeless Tobacco Never Used          Objective   Vital Signs:   /78   Pulse 78   Temp 98.5 °F (36.9 °C)   Resp 20   Ht 165.1 cm (65\")   Wt 135 kg (298 lb)   SpO2 96%   BMI 49.59 kg/m²     Physical Exam  Vitals reviewed.   Constitutional:       Appearance: Normal appearance.   HENT:      Head: Normocephalic and atraumatic.      Nose: Nose normal.      Mouth/Throat:      Mouth: Mucous membranes are moist.   Eyes:      Extraocular Movements: Extraocular movements intact.      Conjunctiva/sclera: Conjunctivae normal.      Pupils: Pupils are equal, round, and reactive to light.   Cardiovascular:      Rate and Rhythm: Normal rate and regular rhythm.   Pulmonary:      Effort: Pulmonary effort is normal.      Breath sounds: Normal breath sounds.   Abdominal:      General: Abdomen is flat. Bowel sounds are normal.      Palpations: Abdomen is soft.   Musculoskeletal:         General: Normal range of motion.   Neurological:      General: No focal deficit present.      Mental Status: She is alert and oriented to person, place, and time.      Cranial Nerves: No cranial nerve deficit.   Psychiatric:         Mood and Affect: Mood normal.        Result Review :   The following data was reviewed by: Dina Clemente PA-C on 2022:  Common labs    Common Labsle 22    1537 1537 1537 1537 1715 1715 1002 1002   Glucose   83   112 (A)     BUN   18   19     Creatinine   0.60   0.75     Sodium   137   141     Potassium   4.1   3.5     Chloride   100   103     Calcium   9.6   9.3     Albumin   4.60   4.20     Total Bilirubin   <0.2   0.2     Alkaline Phosphatase   " 88   82     AST (SGOT)   20   16     ALT (SGPT)   18   15     WBC 12.24 (A)    14.27 (A)   9.42   Hemoglobin 13.0    12.2   12.6   Hematocrit 40.4    37.4   39.7   Platelets 336    302   346   Total Cholesterol    175       Triglycerides    168 (A)       HDL Cholesterol    40       LDL Cholesterol     105 (A)       Hemoglobin A1C  6.00 (A)     5.90 (A)    (A) Abnormal value                      Assessment and Plan    Diagnoses and all orders for this visit:    1. Syncope, unspecified syncope type (Primary)  Assessment & Plan:  Reviewed ER note, labs. Discussed ddx. increase water intake. discussed importance of 3 meals daily and not skipping meals. Limit/avoid caffeine. RTC if sx return/worsen, syncope, loc, seizure, cp, palpitations, unilateral weakness, confusion. EKG wnl, will get holter to eval other causes. Will refer to cards per pt request. Pt understands and agrees      Orders:  -     Holter monitor - 48 hour; Future  -     Cancel: Ambulatory Referral to Cardiology  -     Ambulatory Referral to Cardiology    2. Leukocytosis, unspecified type  Comments:  Will repeat cbc. WBC elevated in ER. discussed if still elevated, would rec abx for sinusitis.  Orders:  -     CBC & Differential    3. Hyperglycemia  -     Hemoglobin A1c    4. Hypotension, unspecified hypotension type  Comments:  BP low today. encouraged increase water intake, change position slowly.     5. Sinusitis, unspecified chronicity, unspecified location  Comments:  Discussed sinus sx can affect inner ear at times. Will tx with antihistamines but if wbc still elevated would rec course of abx.    6. Tingling  -     Vitamin B12  -     Folate      Follow Up   Return in about 1 month (around 9/25/2022).  Patient was given instructions and counseling regarding her condition or for health maintenance advice. Please see specific information pulled into the AVS if appropriate.

## 2022-08-26 NOTE — ASSESSMENT & PLAN NOTE
Reviewed ER note, labs. Discussed ddx. increase water intake. discussed importance of 3 meals daily and not skipping meals. Limit/avoid caffeine. RTC if sx return/worsen, syncope, loc, seizure, cp, palpitations, unilateral weakness, confusion. EKG wnl, will get holter to eval other causes. Will refer to cards per pt request. Pt understands and agrees

## 2022-08-26 NOTE — PROGRESS NOTES
I have reviewed the notes, assessments, and/or procedures performed by Dina Clemente PA-C, I concur with her documentation of Jessie Romero.

## 2022-08-29 ENCOUNTER — TELEPHONE (OUTPATIENT)
Dept: INTERNAL MEDICINE | Facility: CLINIC | Age: 37
End: 2022-08-29

## 2022-09-26 ENCOUNTER — OFFICE VISIT (OUTPATIENT)
Dept: INTERNAL MEDICINE | Facility: CLINIC | Age: 37
End: 2022-09-26

## 2022-09-26 VITALS
TEMPERATURE: 98.1 F | WEIGHT: 293 LBS | HEART RATE: 77 BPM | OXYGEN SATURATION: 98 % | HEIGHT: 65 IN | DIASTOLIC BLOOD PRESSURE: 60 MMHG | BODY MASS INDEX: 48.82 KG/M2 | SYSTOLIC BLOOD PRESSURE: 112 MMHG

## 2022-09-26 DIAGNOSIS — E66.01 CLASS 3 SEVERE OBESITY DUE TO EXCESS CALORIES WITHOUT SERIOUS COMORBIDITY WITH BODY MASS INDEX (BMI) OF 45.0 TO 49.9 IN ADULT: ICD-10-CM

## 2022-09-26 DIAGNOSIS — I95.1 ORTHOSTATIC HYPOTENSION: Primary | ICD-10-CM

## 2022-09-26 PROCEDURE — 99215 OFFICE O/P EST HI 40 MIN: CPT | Performed by: INTERNAL MEDICINE

## 2022-09-26 NOTE — PROGRESS NOTES
"Chief Complaint  Loss of Consciousness (Has developed chills, blurry/unfocused vision, eardrum fluttering, CTS since syncopal episode.  ) and Weight Loss (Saxenda makes patient sick-would like to discuss other options. )    Subjective       Jessie Romero presents to [facility]    HPI   Presents with continued symptoms of hypotension since she had a syncopal episode in August.   Has tried to increase water intake. Does not salt her food as she does not enjoy the taste. Has started to eat more pickles to get some extra salt.   Has been trying to be careful when moving from sitting to standing. Has not had any further syncopal episodes.     Has lost some weight since last visit. Was not able to tolerate the Saxenda due to stomach upset.     Objective     Vitals:    09/26/22 1439   BP: 112/60   BP Location: Right arm   Patient Position: Sitting   Cuff Size: Large Adult   Pulse: 77   Temp: 98.1 °F (36.7 °C)   TempSrc: Temporal   SpO2: 98%   Weight: 135 kg (297 lb 9.6 oz)   Height: 165.1 cm (65\")      Wt Readings from Last 3 Encounters:   09/26/22 135 kg (297 lb 9.6 oz)   08/25/22 135 kg (298 lb)   08/18/22 (!) 141 kg (311 lb 8.2 oz)      BP Readings from Last 3 Encounters:   09/26/22 112/60   08/25/22 100/78   08/18/22 129/87        Body mass index is 49.52 kg/m².    Class 3 Severe Obesity (BMI >=40). Obesity-related health conditions include the following: none. Obesity is improving with lifestyle modifications. BMI is is above average; BMI management plan is completed. We discussed portion control and increasing exercise.       Physical Exam  Vitals reviewed.   Constitutional:       Appearance: Normal appearance. She is well-developed.   HENT:      Head: Normocephalic and atraumatic.      Mouth/Throat:      Pharynx: No oropharyngeal exudate.   Eyes:      Conjunctiva/sclera: Conjunctivae normal.      Pupils: Pupils are equal, round, and reactive to light. "   Cardiovascular:      Rate and Rhythm: Normal rate and regular rhythm.      Heart sounds: No murmur heard.    No friction rub. No gallop.   Pulmonary:      Effort: Pulmonary effort is normal.      Breath sounds: Normal breath sounds. No wheezing or rhonchi.   Skin:     General: Skin is warm and dry.   Neurological:      Mental Status: She is alert and oriented to person, place, and time.   Psychiatric:         Mood and Affect: Affect normal.          Result Review :   The following data was reviewed by: Hanna Pereira MD on 09/26/2022:  CMP    CMP 5/31/22 8/18/22   Glucose 83 112 (A)   BUN 18 19   Creatinine 0.60 0.75   Sodium 137 141   Potassium 4.1 3.5   Chloride 100 103   Calcium 9.6 9.3   Albumin 4.60 4.20   Total Bilirubin <0.2 0.2   Alkaline Phosphatase 88 82   AST (SGOT) 20 16   ALT (SGPT) 18 15   (A) Abnormal value            CBC    CBC 5/31/22 8/18/22 8/25/22   WBC 12.24 (A) 14.27 (A) 9.42   RBC 4.88 4.47 4.70   Hemoglobin 13.0 12.2 12.6   Hematocrit 40.4 37.4 39.7   MCV 82.8 83.7 84.5   MCH 26.6 27.3 26.8   MCHC 32.2 32.6 31.7   RDW 12.8 14.7 13.6   Platelets 336 302 346   (A) Abnormal value            Lipid Panel    Lipid Panel 5/31/22   Total Cholesterol 175   Triglycerides 168 (A)   HDL Cholesterol 40   VLDL Cholesterol 30   LDL Cholesterol  105 (A)   LDL/HDL Ratio 2.54   (A) Abnormal value            TSH    TSH 5/31/22   TSH 1.960           Lab Results   Component Value Date    YMWF28KJ 26.2 (L) 05/31/2022     Lab Results   Component Value Date    [id number] 08/25/2022         Procedures    Assessment and Plan   Diagnoses and all orders for this visit:    1. Orthostatic hypotension (Primary)  Assessment & Plan:  Has Holter scheduled for 10/10/22  Labs from August negative for anemia or electrolyte abnormalities.  Will order plasma norepinephrine   Await results of testing and determine further management based on results  May need referral to cardiology  Continue increased water and  salt intake    Orders:  -     Catecholamines, Fractionated, Plasma; Future    2. Class 3 severe obesity due to excess calories without serious comorbidity with body mass index (BMI) of 45.0 to 49.9 in adult (HCC)  Assessment & Plan:  Unable to tolerate Saxenda, has discontinued medication          I spent 40 minutes caring for Jessie on this date of service. This time includes time spent by me in the following activities:preparing for the visit, reviewing tests, performing a medically appropriate examination and/or evaluation , counseling and educating the patient/family/caregiver, ordering medications, tests, or procedures and documenting information in the medical record     Follow Up   Return in about 1 month (around 10/26/2022) for Next scheduled follow up.  Patient was given instructions and counseling regarding her condition or for health maintenance advice. Please see specific information pulled into the AVS if appropriate.       Answers for HPI/ROS submitted by the patient on 9/19/2022  Please describe your symptoms.: Low blood pressure. I passed out. Chest pressure. Not pain. Pressure. like air pressure  Have you had these symptoms before?: No  How long have you been having these symptoms?: 1-4 days  What is the primary reason for your visit?: Other

## 2022-09-26 NOTE — ASSESSMENT & PLAN NOTE
Has Holter scheduled for 10/10/22  Labs from August negative for anemia or electrolyte abnormalities.  Will order plasma norepinephrine   Await results of testing and determine further management based on results  May need referral to cardiology  Continue increased water and salt intake

## 2022-09-27 ENCOUNTER — LAB (OUTPATIENT)
Dept: LAB | Facility: HOSPITAL | Age: 37
End: 2022-09-27

## 2022-09-27 DIAGNOSIS — I95.1 ORTHOSTATIC HYPOTENSION: ICD-10-CM

## 2022-09-27 PROCEDURE — 36415 COLL VENOUS BLD VENIPUNCTURE: CPT

## 2022-09-27 PROCEDURE — 82384 ASSAY THREE CATECHOLAMINES: CPT

## 2022-09-30 LAB
DOPAMINE SERPL-MCNC: <30 PG/ML (ref 0–48)
EPINEPH PLAS-MCNC: <15 PG/ML (ref 0–62)
NOREPINEPH PLAS-MCNC: 269 PG/ML (ref 0–874)

## 2022-10-06 ENCOUNTER — TELEPHONE (OUTPATIENT)
Dept: INTERNAL MEDICINE | Facility: CLINIC | Age: 37
End: 2022-10-06

## 2022-10-06 NOTE — TELEPHONE ENCOUNTER
Called and spoke with patient to inform her about lab results being normal. Advised patient to keep her appointment later this month so that she can go over any questions or concerns.

## 2022-10-10 ENCOUNTER — HOSPITAL ENCOUNTER (OUTPATIENT)
Dept: CARDIOLOGY | Facility: HOSPITAL | Age: 37
Discharge: HOME | End: 2022-10-10
Admitting: PHYSICIAN ASSISTANT

## 2022-10-10 DIAGNOSIS — R55 SYNCOPE, UNSPECIFIED SYNCOPE TYPE: ICD-10-CM

## 2022-10-10 PROCEDURE — 93225 XTRNL ECG REC<48 HRS REC: CPT

## 2022-10-19 LAB
MAXIMAL PREDICTED HEART RATE: 185 BPM
STRESS TARGET HR: 157 BPM

## 2022-10-19 PROCEDURE — 93227 XTRNL ECG REC<48 HR R&I: CPT | Performed by: INTERNAL MEDICINE

## 2022-10-27 ENCOUNTER — OFFICE VISIT (OUTPATIENT)
Dept: INTERNAL MEDICINE | Facility: CLINIC | Age: 37
End: 2022-10-27

## 2022-10-27 VITALS
SYSTOLIC BLOOD PRESSURE: 98 MMHG | HEART RATE: 88 BPM | HEIGHT: 65 IN | DIASTOLIC BLOOD PRESSURE: 60 MMHG | WEIGHT: 293 LBS | OXYGEN SATURATION: 99 % | TEMPERATURE: 97.6 F | BODY MASS INDEX: 48.82 KG/M2

## 2022-10-27 DIAGNOSIS — E66.01 CLASS 3 SEVERE OBESITY DUE TO EXCESS CALORIES WITHOUT SERIOUS COMORBIDITY WITH BODY MASS INDEX (BMI) OF 45.0 TO 49.9 IN ADULT: ICD-10-CM

## 2022-10-27 DIAGNOSIS — I95.9 HYPOTENSION, UNSPECIFIED HYPOTENSION TYPE: Primary | ICD-10-CM

## 2022-10-27 PROCEDURE — 99215 OFFICE O/P EST HI 40 MIN: CPT | Performed by: INTERNAL MEDICINE

## 2022-10-28 ENCOUNTER — TELEPHONE (OUTPATIENT)
Dept: INTERNAL MEDICINE | Facility: CLINIC | Age: 37
End: 2022-10-28

## 2022-11-01 ENCOUNTER — PRIOR AUTHORIZATION (OUTPATIENT)
Dept: INTERNAL MEDICINE | Facility: CLINIC | Age: 37
End: 2022-11-01

## 2022-11-15 ENCOUNTER — TELEPHONE (OUTPATIENT)
Dept: INTERNAL MEDICINE | Facility: CLINIC | Age: 37
End: 2022-11-15

## 2022-11-15 NOTE — TELEPHONE ENCOUNTER
Caller: HeatherOmarJessielotus Mancuso    Relationship: Self    Best call back number:    [phone]      Requested Prescriptions:   Requested Prescriptions      No prescriptions requested or ordered in this encounter      PEN NEEDLES    Pharmacy where request should be sent: [facility] [id number] [address] [phone]  - [phone] FX     Additional details provided by patient: PATIENT HAS NO NEEDLES AT ALL    Does the patient have less than a 3 day supply:  [x] Yes  [] No    Yenny You Rep   11/15/22 15:22 EST

## 2022-11-17 RX ORDER — BLOOD SUGAR DIAGNOSTIC
1 STRIP MISCELLANEOUS DAILY
Qty: 100 EACH | Refills: 3 | Status: SHIPPED | OUTPATIENT
Start: 2022-11-17

## 2022-12-08 ENCOUNTER — TELEPHONE (OUTPATIENT)
Dept: INTERNAL MEDICINE | Facility: CLINIC | Age: 37
End: 2022-12-08

## 2022-12-08 NOTE — TELEPHONE ENCOUNTER
Pt states she does not want to be seen for this issue but wants Dr. Pereira to know it is still going on. She was sleeping and was awoken with the attach and had diarrhea after.

## 2022-12-08 NOTE — TELEPHONE ENCOUNTER
Caller: Denia Romerolotus Mancuso    Relationship: Self    Best call back number: [phone]    What is the best time to reach you: ANY    Who are you requesting to speak with (clinical staff, provider,  specific staff member): CLINICAL     What was the call regarding: PATIENT REPORTS SHE HAD A SEVERE PANIC ATTACK LAST NIGHT AT 8PM THAT LASTED TILL 10:30 - 11:00 PM.     REPORTS HAVING THE FOLLOWING:  BRIGHT RED RASH (OVER ENTIRE BODY)  SEVERE CHILLS  FELT HOT  PURPLE FEET  SHORTNESS OF AIR  DISORIENTED  SEVERE ABDOMINAL CRAMPING  EYE REDNESS  HEART RACING      Do you require a callback: YES      HUB TRANSFERRED TO OFFICE PER RED FLAG WORKFLOW

## 2023-01-02 NOTE — PROGRESS NOTES
Chief Complaint  Establish Care, Chest Pain (Hx of MI 10 years ago), and Hypotension      History of Present Illness  Jessie Romero presents to [facility]  Patient is a 37-year-old female who had a episode in August in which she developed sensation while driving of feeling cold, diaphoretic, nauseous, dry mouth, and lightheaded.  She had some visual disturbances like some when shining a flashlight in her eyes was able to pull over and then afterwards lost consciousness.  She was seen in the emergency room then work-up was unremarkable regarding EKG and laboratory evaluation.  She states that she has been found at times to have low blood pressure.  She also has experienced diffuse body pains sweats cannot breathe and has had recurrent episodes of presyncope with these episodes on a monthly basis since then.  This can occur at rest or with activities.  Patient is been having these episodes about once per month.  She has had at least 2 perla syncopal episodes        Past Medical History:   Diagnosis Date   • Allergic 2000    Moved to [place]   • Headache 2002   • Heart murmur    • Myocardial infarction (HCC) 03/2010         Current Outpatient Medications:   •  busPIRone (BUSPAR) 10 MG tablet, Take 10 mg by mouth Daily., Disp: , Rfl:   •  cyclobenzaprine (FLEXERIL) 10 MG tablet, Take 1 tablet by mouth 3 (Three) Times a Day As Needed for Muscle Spasms. (Patient taking differently: Take 10 mg by mouth As Needed for Muscle Spasms.), Disp: 20 tablet, Rfl: 0  •  fexofenadine (Allegra Allergy) 180 MG tablet, Take 1 tablet by mouth Daily., Disp: 90 tablet, Rfl: 0  •  Insulin Pen Needle (Pen Needles) 32G X 5 MM misc, 1 each Daily. For use with Saxenda, Disp: 100 each, Rfl: 3  •  Liraglutide (SAXENDA) 18 MG/3ML injection pen, Inject 0.6mg under skin daily for week one, THEN 1.2mg daily for week two, THEN 1.8mg daily for week three, then 2.4mg daily for week four., Disp: 3 mL, Rfl: 0    There  are no discontinued medications.  Allergies   Allergen Reactions   • Morphine Hallucinations   • Doxycycline Hives     Shortness of air   • Adhesive Tape Rash   • Latex Rash        Social History     Tobacco Use   • Smoking status: Former     Packs/day: 0.50     Years: 17.00     Pack years: 8.50     Types: Cigarettes     Quit date: 2020     Years since quitting: 3.0   • Smokeless tobacco: Never   Vaping Use   • Vaping Use: Never used   Substance Use Topics   • Alcohol use: Not Currently     Comment: Drinks maybe once a year   • Drug use: Never       Family History   Problem Relation Age of Onset   • Hyperlipidemia Mother    • Thyroid disease Mother    • Alcohol abuse Father         Sober 25 years now   • Arthritis Maternal Grandmother    • COPD Maternal Grandmother         Smoker   • COPD Paternal Grandfather         Smoker   • Alcohol abuse Maternal Aunt         Dead        Objective     /96   Pulse 84   Ht 165.1 cm (65\")   Wt 132 kg (290 lb)   BMI 48.26 kg/m²       Physical Exam    General Appearance:   · no acute distress  · Alert and oriented x3  HENT:   · lips not cyanotic  · Atraumatic  Neck:  · No jvd   · supple  Respiratory:  · no respiratory distress  · normal breath sounds  · no rales  Cardiovascular:  · Regular rate and rhythm  · no S3, no S4   · no murmur  · no rub  Extremities  · No cyanosis  · lower extremity edema: none    Skin:   · warm, dry  · No rashes    Result Review :     No results found for: PROBNP  CMP    CMP 5/31/22 8/18/22   Glucose 83 112 (A)   BUN 18 19   Creatinine 0.60 0.75   eGFR 120.2 106.6   Sodium 137 141   Potassium 4.1 3.5   Chloride 100 103   Calcium 9.6 9.3   Total Protein 7.2 7.0   Albumin 4.60 4.20   Globulin 2.6 2.8   Total Bilirubin <0.2 0.2   Alkaline Phosphatase 88 82   AST (SGOT) 20 16   ALT (SGPT) 18 15   Albumin/Globulin Ratio 1.8 1.5   BUN/Creatinine Ratio 30.0 (A) 25.3 (A)   Anion Gap 14.4 13.2   (A) Abnormal value       Comments are available for some  flowsheets but are not being displayed.           CBC w/diff    CBC w/Diff 5/31/22 8/18/22 8/25/22   WBC 12.24 (A) 14.27 (A) 9.42   RBC 4.88 4.47 4.70   Hemoglobin 13.0 12.2 12.6   Hematocrit 40.4 37.4 39.7   MCV 82.8 83.7 84.5   MCH 26.6 27.3 26.8   MCHC 32.2 32.6 31.7   RDW 12.8 14.7 13.6   Platelets 336 302 346   Neutrophil Rel % 64.2 75.4 62.3   Immature Granulocyte Rel % 0.5 0.5 0.2   Lymphocyte Rel % 24.0 16.5 (A) 26.9   Monocyte Rel % 8.1 6.7 7.3   Eosinophil Rel % 2.6 0.7 2.8   Basophil Rel % 0.6 0.2 0.5   (A) Abnormal value             Lab Results   Component Value Date    TSH 1.960 05/31/2022      Lab Results   Component Value Date    FREET4 1.02 05/31/2022      No results found for: DDIMERQUANT  Magnesium   Date Value Ref Range Status   08/18/2022 2.0 1.6 - 2.6 mg/dL Final      No results found for: DIGOXIN   Lab Results   Component Value Date    TROPONINT <0.010 08/18/2022      No results found for: POCTROP(       Lipid Panel    Lipid Panel 5/31/22   Total Cholesterol 175   Triglycerides 168 (A)   HDL Cholesterol 40   VLDL Cholesterol 30   LDL Cholesterol  105 (A)   LDL/HDL Ratio 2.54   (A) Abnormal value                 Sinus rhythm  Low voltage, precordial leads  Borderline T abnormalities, diffuse leads  No previous ECG available for comparison  •  Patient was monitored for 1 day, 23 hours, and 57 minutes.  •  Average heart rate was 73 bpm with maximum heart rate 114 bpm.  Mild bradycardia noted in middle of night.  •  Rare PAC, occasional PVC.  •  No patient activated events.  •  No significant arrhythmias noted.  Sinus rhythm  Low voltage, precordial leads  Borderline T abnormalities, diffuse leads  No previous ECG available for comparison  •  Patient was monitored for 1 day, 23 hours, and 57 minutes.  •  Average heart rate was 73 bpm with maximum heart rate 114 bpm.  Mild bradycardia noted in middle of night.  •  Rare PAC, occasional PVC.  •  No patient activated events.  •  No significant  arrhythmias noted.      ECG 12 Lead    Date/Time: 1/5/2023 4:23 PM  Performed by: Odin Richey MD  Authorized by: Odin Richey MD   Comparison: compared with previous ECG   Similar to previous ECG  Rhythm: sinus rhythm  Other findings: low voltage           No results found for this or any previous visit.             The ASCVD Risk score (Slade WEISS, et al., 2019) failed to calculate for the following reasons:    The 2019 ASCVD risk score is only valid for ages 40 to 79     Diagnoses and all orders for this visit:    1. Syncope, unspecified syncope type (Primary)  Assessment & Plan:  Patient with recurrent episodes of presyncope and 2 syncopal episodes with no defined etiology symptoms could be vasovagal he mediated certainly some of the symptoms sound vagal in nature but there does not appear any consistent trigger for this.  Also on the differential a neurologic etiology could be considered given the flashing lights in the least with 1 episode.  Her baseline EKG shows low voltage but no other abnormalities this could be just due to her body habitus and may represent a normal EKG.  Recommended echocardiogram, 30-day event monitor, and tilt table test for further assessment.  Also will get a cortisol level given question of hypotension in the past    Orders:  -     Cancel: Adult Transthoracic Echo Complete W/ Cont if Necessary Per Protocol; Future  -     Tilt Table; Future  -     Cardiac Event Monitor; Future  -     ECG 12 Lead  -     Adult Transthoracic Echo Complete W/ Cont if Necessary Per Protocol; Future    2. Orthostatic hypotension  Assessment & Plan:  Patient with episodes per report of hypertension cannot find definitive documentation of this in addition to her previous work-up recommend checking a cortisol level and will get tilt table test    Orders:  -     Cancel: Adult Transthoracic Echo Complete W/ Cont if Necessary Per Protocol; Future  -     Tilt Table; Future  -     Cardiac Event Monitor; Future  -      Cortisol; Future  -     Adult Transthoracic Echo Complete W/ Cont if Necessary Per Protocol; Future          Follow Up     Return in about 6 weeks (around 2/16/2023).          Patient was given instructions and counseling regarding her condition or for health maintenance advice. Please see specific information pulled into the AVS if appropriate.

## 2023-01-05 ENCOUNTER — OFFICE VISIT (OUTPATIENT)
Dept: CARDIOLOGY | Facility: CLINIC | Age: 38
End: 2023-01-05
Payer: COMMERCIAL

## 2023-01-05 VITALS
HEART RATE: 84 BPM | SYSTOLIC BLOOD PRESSURE: 143 MMHG | WEIGHT: 290 LBS | HEIGHT: 65 IN | BODY MASS INDEX: 48.32 KG/M2 | DIASTOLIC BLOOD PRESSURE: 96 MMHG

## 2023-01-05 DIAGNOSIS — R55 SYNCOPE, UNSPECIFIED SYNCOPE TYPE: Primary | ICD-10-CM

## 2023-01-05 DIAGNOSIS — I95.1 ORTHOSTATIC HYPOTENSION: ICD-10-CM

## 2023-01-05 PROCEDURE — 93000 ELECTROCARDIOGRAM COMPLETE: CPT | Performed by: INTERNAL MEDICINE

## 2023-01-05 PROCEDURE — 99204 OFFICE O/P NEW MOD 45 MIN: CPT | Performed by: INTERNAL MEDICINE

## 2023-01-05 RX ORDER — BUSPIRONE HYDROCHLORIDE 10 MG/1
10 TABLET ORAL DAILY
COMMUNITY
End: 2023-01-30 | Stop reason: SDUPTHER

## 2023-01-05 NOTE — ASSESSMENT & PLAN NOTE
Patient with episodes per report of hypertension cannot find definitive documentation of this in addition to her previous work-up recommend checking a cortisol level and will get tilt table test

## 2023-01-05 NOTE — ASSESSMENT & PLAN NOTE
Patient with recurrent episodes of presyncope and 2 syncopal episodes with no defined etiology symptoms could be vasovagal he mediated certainly some of the symptoms sound vagal in nature but there does not appear any consistent trigger for this.  Also on the differential a neurologic etiology could be considered given the flashing lights in the least with 1 episode.  Her baseline EKG shows low voltage but no other abnormalities this could be just due to her body habitus and may represent a normal EKG.  Recommended echocardiogram, 30-day event monitor, and tilt table test for further assessment.  Also will get a cortisol level given question of hypotension in the past

## 2023-01-06 ENCOUNTER — LAB (OUTPATIENT)
Dept: LAB | Facility: HOSPITAL | Age: 38
End: 2023-01-06
Payer: COMMERCIAL

## 2023-01-06 DIAGNOSIS — I95.1 ORTHOSTATIC HYPOTENSION: ICD-10-CM

## 2023-01-06 LAB — CORTIS SERPL-MCNC: 5.97 MCG/DL

## 2023-01-06 PROCEDURE — 82533 TOTAL CORTISOL: CPT

## 2023-01-06 PROCEDURE — 36415 COLL VENOUS BLD VENIPUNCTURE: CPT

## 2023-01-09 ENCOUNTER — TELEPHONE (OUTPATIENT)
Dept: CARDIOLOGY | Facility: CLINIC | Age: 38
End: 2023-01-09
Payer: COMMERCIAL

## 2023-01-09 NOTE — TELEPHONE ENCOUNTER
----- Message from LILLIAM Hernandez sent at 1/6/2023  5:15 PM EST -----  Notify pt cortisol level is in normal range, continue current treatment

## 2023-01-17 ENCOUNTER — TELEPHONE (OUTPATIENT)
Dept: INTERNAL MEDICINE | Facility: CLINIC | Age: 38
End: 2023-01-17
Payer: COMMERCIAL

## 2023-01-17 NOTE — TELEPHONE ENCOUNTER
Caller: HeatherOmarJessielotus Mancuso    Relationship: Self    Best call back number: [phone]    Requested Prescriptions:   Liraglutide (SAXENDA) 18 MG/3ML injection pen    Pharmacy where request should be sent: [facility] [id number] [address] - [phone]  - [phone] FX     Additional details provided by patient: PATIENT CURRENTLY HAS A PEN AND A HALF LEFT.     Does the patient have less than a 3 day supply:  [x] Yes  [] No    Would you like a call back once the refill request has been completed: [x] Yes [] No    If the office needs to give you a call back, can they leave a voicemail: [x] Yes [] No    Yenny Buchanan Rep   01/17/23 15:50 EST

## 2023-01-30 ENCOUNTER — OFFICE VISIT (OUTPATIENT)
Dept: INTERNAL MEDICINE | Facility: CLINIC | Age: 38
End: 2023-01-30
Payer: COMMERCIAL

## 2023-01-30 VITALS
DIASTOLIC BLOOD PRESSURE: 82 MMHG | OXYGEN SATURATION: 98 % | HEART RATE: 62 BPM | WEIGHT: 290.2 LBS | SYSTOLIC BLOOD PRESSURE: 112 MMHG | TEMPERATURE: 99.2 F | BODY MASS INDEX: 48.29 KG/M2

## 2023-01-30 DIAGNOSIS — F41.9 ANXIETY: ICD-10-CM

## 2023-01-30 DIAGNOSIS — E66.01 CLASS 3 SEVERE OBESITY DUE TO EXCESS CALORIES WITHOUT SERIOUS COMORBIDITY WITH BODY MASS INDEX (BMI) OF 45.0 TO 49.9 IN ADULT: Primary | ICD-10-CM

## 2023-01-30 DIAGNOSIS — F32.1 MAJOR DEPRESSIVE DISORDER, SINGLE EPISODE, MODERATE: ICD-10-CM

## 2023-01-30 DIAGNOSIS — F43.21 COMPLICATED GRIEVING: ICD-10-CM

## 2023-01-30 DIAGNOSIS — F43.9 STRESS: ICD-10-CM

## 2023-01-30 PROCEDURE — 99214 OFFICE O/P EST MOD 30 MIN: CPT | Performed by: NURSE PRACTITIONER

## 2023-01-30 RX ORDER — CYCLOBENZAPRINE HCL 10 MG
10 TABLET ORAL AS NEEDED
Qty: 30 TABLET | Refills: 1 | OUTPATIENT
Start: 2023-01-30 | End: 2023-03-07 | Stop reason: SDUPTHER

## 2023-01-30 RX ORDER — BUSPIRONE HYDROCHLORIDE 10 MG/1
10 TABLET ORAL 3 TIMES DAILY
Qty: 60 TABLET | Refills: 0 | Status: SHIPPED | OUTPATIENT
Start: 2023-01-30 | End: 2023-02-13 | Stop reason: SDUPTHER

## 2023-01-30 RX ORDER — ESCITALOPRAM OXALATE 10 MG/1
10 TABLET ORAL DAILY
Qty: 60 TABLET | Refills: 0 | Status: SHIPPED | OUTPATIENT
Start: 2023-01-30 | End: 2023-02-13 | Stop reason: SDUPTHER

## 2023-01-30 NOTE — PROGRESS NOTES
Chief Complaint  anxiety attacks  (Anxiety and stress levels making patient cry or anxious, mad or angry had a ray of emotions as a child, feels like brain is in a fog, forgets to brush teeth or bathe, has gone through breakup with fiance )    Subjective        Jessie Romero presents to [facility] for History of Present Illness  Concerns of depression, stress, anxiety.    Patient reports that she is having some difficulty with her emotions and feelings.  Patient reports that in December her fiancé of 15 years decided he wanted to separate.  She has been dealing with that loss and grief since that time.  She is still living in the same home with him, still having to engage in conversation with him.  She reports arguments that were significant over the last couple of weeks.  She reports very high stress level, severe anxiety, lots of sadness and crying.  She has had issues in the past, but she typically keeps them very bottled up.  She had a therapist at 1 time, but that person had to relocate and she never sought any care thereafter.  She has used BuSpar in the past as well, and did well with that.  She has no thoughts of self-harm or harm to others at this time, but she does report she has had suicide attempt as a teenager.  She states that she would never have the desire to do that again.  She felt like it was a very low point in her life.  She is wanting to avoid getting it low, and is seeking help today.       Objective   Vital Signs:   /82 (BP Location: Right arm, Patient Position: Sitting, Cuff Size: Large Adult)   Pulse 62   Temp 99.2 °F (37.3 °C) (Temporal)   Wt 132 kg (290 lb 3.2 oz)   SpO2 98%   BMI 48.29 kg/m²     Physical Exam  Vitals and nursing note reviewed.   Constitutional:       Appearance: Normal appearance. She is obese.   HENT:      Head: Normocephalic and atraumatic.      Mouth/Throat:      Mouth: Mucous membranes are moist.      Pharynx: Oropharynx  is clear.   Eyes:      Conjunctiva/sclera: Conjunctivae normal.      Pupils: Pupils are equal, round, and reactive to light.   Pulmonary:      Effort: Pulmonary effort is normal.   Neurological:      Mental Status: She is alert.   Psychiatric:         Mood and Affect: Mood normal.         Thought Content: Thought content normal.         Judgment: Judgment normal.        Result Review :  {The following data was reviewed by LILLIAM Waters on 01/30/23                Diagnoses and all orders for this visit:    1. Class 3 severe obesity due to excess calories without serious comorbidity with body mass index (BMI) of 45.0 to 49.9 in adult (HCC) (Primary)  Assessment & Plan:  Patient does reports she is doing well on her medication, this was not the primary reason of her visit, but she did bring it up and has been doing very well.  We commended her for job well done, and to continue her current regimen.    Orders:  -     Ambulatory Referral to Psychiatry    2. Complicated grieving  -     Ambulatory Referral to Psychiatry    3. Major depressive disorder, single episode, moderate (HCC)  -     Ambulatory Referral to Psychiatry    4. Anxiety  -     Ambulatory Referral to Psychiatry    5. Stress  -     Ambulatory Referral to Psychiatry    Other orders  -     cyclobenzaprine (FLEXERIL) 10 MG tablet; Take 1 tablet by mouth As Needed for Muscle Spasms.  Dispense: 30 tablet; Refill: 1  -     escitalopram (Lexapro) 10 MG tablet; Take 1 tablet by mouth Daily.  Dispense: 60 tablet; Refill: 0  -     busPIRone (BUSPAR) 10 MG tablet; Take 1 tablet by mouth 3 (Three) Times a Day.  Dispense: 60 tablet; Refill: 0  Discussed with patient medication including the Lexapro and BuSpar, discussed side effects, risk versus benefits, and appropriate use.  Patient was okay with plan to start medication.  She understands typical timeframe of 6 weeks for Lexapro to have full effect.  We will get her referred to psych, with Kaylie, and let her  work with her to process lots of these things that she is dealing with at this time.  Patient understands that it is will take some time to work through many of this.  She verbally agrees to do no self-harm and if she ever has feelings or thoughts of this, she will call us directly, if ever an emergency, she would go directly to the ER.  We will follow-up with her in 8 weeks if not seen by psychiatry at that point in time.  She is welcome to follow-up with us at any point in time during this journey.      Follow Up   No follow-ups on file.  Patient was given instructions and counseling regarding her condition or for health maintenance advice. Please see specific information pulled into the AVS if appropriate.     Mamadou Richardson, APRN  1/30/2023  This note was electronically signed.

## 2023-01-30 NOTE — ASSESSMENT & PLAN NOTE
Patient does reports she is doing well on her medication, this was not the primary reason of her visit, but she did bring it up and has been doing very well.  We commended her for job well done, and to continue her current regimen.

## 2023-02-10 ENCOUNTER — TELEPHONE (OUTPATIENT)
Dept: CARDIOLOGY | Facility: CLINIC | Age: 38
End: 2023-02-10
Payer: COMMERCIAL

## 2023-02-10 NOTE — TELEPHONE ENCOUNTER
----- Message from LILLIAM Hernandez sent at 2/9/2023  1:01 PM EST -----  Notify pt holter result: Baseline rhythm was normal sinus with an average heart of 85  Maximum heart rate was 148/Minimum heart rate was 42  PVCs were 96/PACs were 1 (less than 1% of all beats) can be felt as palpitations, limit caffeine intake     14 patient triggered events: 10 were normal sinus, 1 was sinus tachycardia with PVC, and 3 were normal sinus rhythm with PVC     No significant dysrhythmias noted. Continue with echo on 02/21/23
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
## 2023-02-13 ENCOUNTER — OFFICE VISIT (OUTPATIENT)
Dept: BEHAVIORAL HEALTH | Facility: CLINIC | Age: 38
End: 2023-02-13
Payer: COMMERCIAL

## 2023-02-13 VITALS
DIASTOLIC BLOOD PRESSURE: 66 MMHG | SYSTOLIC BLOOD PRESSURE: 116 MMHG | WEIGHT: 289 LBS | HEART RATE: 68 BPM | HEIGHT: 65 IN | BODY MASS INDEX: 48.15 KG/M2

## 2023-02-13 DIAGNOSIS — F43.10 POST TRAUMATIC STRESS DISORDER (PTSD): ICD-10-CM

## 2023-02-13 DIAGNOSIS — F41.1 GENERALIZED ANXIETY DISORDER: ICD-10-CM

## 2023-02-13 DIAGNOSIS — F33.2 SEVERE EPISODE OF RECURRENT MAJOR DEPRESSIVE DISORDER, WITHOUT PSYCHOTIC FEATURES: Primary | ICD-10-CM

## 2023-02-13 DIAGNOSIS — F51.01 PRIMARY INSOMNIA: ICD-10-CM

## 2023-02-13 PROCEDURE — 90792 PSYCH DIAG EVAL W/MED SRVCS: CPT

## 2023-02-13 RX ORDER — ESCITALOPRAM OXALATE 10 MG/1
10 TABLET ORAL DAILY
Qty: 60 TABLET | Refills: 1 | Status: SHIPPED | OUTPATIENT
Start: 2023-02-13 | End: 2023-03-01 | Stop reason: SDUPTHER

## 2023-02-13 RX ORDER — PRAZOSIN HYDROCHLORIDE 1 MG/1
1 CAPSULE ORAL NIGHTLY
Qty: 30 CAPSULE | Refills: 1 | Status: SHIPPED | OUTPATIENT
Start: 2023-02-13 | End: 2023-03-07 | Stop reason: ALTCHOICE

## 2023-02-13 RX ORDER — BUSPIRONE HYDROCHLORIDE 10 MG/1
10 TABLET ORAL 3 TIMES DAILY
Qty: 60 TABLET | Refills: 1 | Status: SHIPPED | OUTPATIENT
Start: 2023-02-13 | End: 2023-04-04 | Stop reason: DRUGHIGH

## 2023-02-16 ENCOUNTER — PATIENT ROUNDING (BHMG ONLY) (OUTPATIENT)
Dept: PSYCHIATRY | Facility: CLINIC | Age: 38
End: 2023-02-16
Payer: COMMERCIAL

## 2023-02-16 NOTE — PROGRESS NOTES
February 16, 2023    Hello, may I speak with Jessie Romero?    My name is HARRY KHANFRANCISCO NRCMA/NRCPT    I am  with [facility]  [address] [place] [id number] [phone].    Before we get started may I verify your date of birth? 1985    I am calling to officially welcome you to our practice and ask about your recent visit. Is this a good time to talk?     Tell me about your visit with us. What things went well?  EVERYTHING WAS GREAT     We're always looking for ways to make our patients' experiences even better. Do you have recommendations on ways we may improve?   NOPE    Overall were you satisfied with your first visit to our practice?  YES     I appreciate you taking the time to speak with me today. Is there anything else I can do for you?   NOPE    Thank you, and have a great day.

## 2023-02-16 NOTE — TELEPHONE ENCOUNTER
PT(PATIENT) VERIFIED     MED REFILL REQUEST   Liraglutide (SAXENDA) 18 MG/3ML injection pen (2023)    PT(PATIENT) REQUESTED FOR MEDICATION TO GO TO [facility]     PROVIDER PLEASE ADVISE

## 2023-02-16 NOTE — PATIENT INSTRUCTIONS
1.  Please return to clinic at your next scheduled visit.  Please contact the clinic ([phone]) at least 24 hours prior in the event you need to cancel.  2.  Do no harm to yourself or others.    3.  Avoid alcohol and drugs.    4.  Take all medications as prescribed.  Please contact the clinic with any concerns. If you are in need of medication refills, please call the clinic at [phone].    5. Should you want to get in touch with your provider, LILLIAM Stanford, please contact the office ([phone]), and staff will be able to page Kiersten directly.  6. In the event you have personal crisis, contact the following crisis numbers: Suicide Prevention Hotline 1-345.254.1629; [facility] Helpline 9-335-530-ZWIC; [facility] [phone]; text HELLO to 104997; or 206.     SPECIFIC RECOMMENDATIONS:     1.      Medications discussed at this encounter:                     2.      Psychotherapy recommendations: We will continue therapy at future visits.     3.     Return to clinic:

## 2023-02-17 ENCOUNTER — TELEPHONE (OUTPATIENT)
Dept: BEHAVIORAL HEALTH | Facility: CLINIC | Age: 38
End: 2023-02-17
Payer: COMMERCIAL

## 2023-02-17 ENCOUNTER — PRIOR AUTHORIZATION (OUTPATIENT)
Dept: INTERNAL MEDICINE | Facility: CLINIC | Age: 38
End: 2023-02-17
Payer: COMMERCIAL

## 2023-02-17 NOTE — TELEPHONE ENCOUNTER
"Patient LMVM that she would like for Provider to call her to discuss the \"sleeping medication you gave me is Not working\"  Please call and advise what to do next?  "

## 2023-02-21 ENCOUNTER — HOSPITAL ENCOUNTER (OUTPATIENT)
Dept: CARDIOLOGY | Facility: HOSPITAL | Age: 38
Discharge: HOME | End: 2023-02-21
Admitting: INTERNAL MEDICINE
Payer: COMMERCIAL

## 2023-02-21 ENCOUNTER — TELEPHONE (OUTPATIENT)
Dept: CARDIOLOGY | Facility: CLINIC | Age: 38
End: 2023-02-21
Payer: COMMERCIAL

## 2023-02-21 DIAGNOSIS — R55 SYNCOPE, UNSPECIFIED SYNCOPE TYPE: ICD-10-CM

## 2023-02-21 DIAGNOSIS — I95.1 ORTHOSTATIC HYPOTENSION: ICD-10-CM

## 2023-02-21 LAB
BH CV ECHO MEAS - AO ROOT DIAM: 2.9 CM
BH CV ECHO MEAS - EF(MOD-SP4): 65 %
BH CV ECHO MEAS - IVSD: 1 CM
BH CV ECHO MEAS - LA DIMENSION: 3.9 CM
BH CV ECHO MEAS - LAT PEAK E' VEL: 7 CM/SEC
BH CV ECHO MEAS - LVIDD: 5.4 CM
BH CV ECHO MEAS - LVIDS: 3.3 CM
BH CV ECHO MEAS - LVPWD: 0.9 CM
BH CV ECHO MEAS - MED PEAK E' VEL: 14 CM/SEC
BH CV ECHO MEAS - MV A MAX VEL: 78 CM/SEC
BH CV ECHO MEAS - MV DEC TIME: 182 MSEC
BH CV ECHO MEAS - MV E MAX VEL: 88 CM/SEC
BH CV ECHO MEAS - MV E/A: 1.1
BH CV ECHO MEASUREMENTS AVERAGE E/E' RATIO: 8.38
IVRT: 61 MSEC
LEFT ATRIUM VOLUME INDEX: 26 ML/M2
LV EF 2D ECHO EST: 55 %
MAXIMAL PREDICTED HEART RATE: 184 BPM
STRESS TARGET HR: 156 BPM

## 2023-02-21 PROCEDURE — 93306 TTE W/DOPPLER COMPLETE: CPT

## 2023-02-21 PROCEDURE — 93306 TTE W/DOPPLER COMPLETE: CPT | Performed by: INTERNAL MEDICINE

## 2023-02-21 NOTE — TELEPHONE ENCOUNTER
PA Case: [id number], Status: Approved, Coverage Starts on: 3/2/2023 12:00 AM, Coverage Ends on: 3/2/2024 12:00 AM. Questions? Contact [phone].

## 2023-02-21 NOTE — TELEPHONE ENCOUNTER
----- Message from LILLIAM Hernandez sent at 2/21/2023  3:47 PM EST -----  Notify pt echo result: ·  Left ventricular systolic function is normal. Estimated left ventricular EF = 55%  ·  Left ventricular diastolic function was normal. No valve disease noted.  Follow up as scheduled

## 2023-02-22 ENCOUNTER — OFFICE VISIT (OUTPATIENT)
Dept: CARDIOLOGY | Facility: CLINIC | Age: 38
End: 2023-02-22
Payer: COMMERCIAL

## 2023-02-22 VITALS
SYSTOLIC BLOOD PRESSURE: 123 MMHG | DIASTOLIC BLOOD PRESSURE: 77 MMHG | WEIGHT: 286 LBS | HEART RATE: 95 BPM | HEIGHT: 65 IN | BODY MASS INDEX: 47.65 KG/M2

## 2023-02-22 DIAGNOSIS — R55 SYNCOPE, UNSPECIFIED SYNCOPE TYPE: Primary | ICD-10-CM

## 2023-02-22 DIAGNOSIS — I95.1 ORTHOSTATIC HYPOTENSION: ICD-10-CM

## 2023-02-22 PROCEDURE — 99213 OFFICE O/P EST LOW 20 MIN: CPT | Performed by: INTERNAL MEDICINE

## 2023-02-22 NOTE — ASSESSMENT & PLAN NOTE
Likely secondary to orthostasis or vasovagal syncope controlled with only some intermittent dizziness spells since last visit with lifestyle modification continue to encourage hydration compression stockings and exercises.

## 2023-02-22 NOTE — PROGRESS NOTES
Chief Complaint  Follow-up, Loss of Consciousness, and f/u cardiac testing    Subjective    Patient has had improvement in her symptoms still with some intermittent dizziness at work no recurrent syncopal episodes.  She is currently not wearing compression stockings    Past Medical History:   Diagnosis Date   • Allergic 2000    Moved to [place]   • Headache 2002   • Heart murmur    • Myocardial infarction (HCC) 03/2010         Current Outpatient Medications:   •  busPIRone (BUSPAR) 10 MG tablet, Take 1 tablet by mouth 3 (Three) Times a Day., Disp: 60 tablet, Rfl: 1  •  cyclobenzaprine (FLEXERIL) 10 MG tablet, Take 1 tablet by mouth As Needed for Muscle Spasms., Disp: 30 tablet, Rfl: 1  •  escitalopram (Lexapro) 10 MG tablet, Take 1 tablet by mouth Daily., Disp: 60 tablet, Rfl: 1  •  fexofenadine (Allegra Allergy) 180 MG tablet, Take 1 tablet by mouth Daily., Disp: 90 tablet, Rfl: 0  •  Insulin Pen Needle (Pen Needles) 32G X 5 MM misc, 1 each Daily. For use with Saxenda, Disp: 100 each, Rfl: 3  •  Liraglutide (SAXENDA) 18 MG/3ML injection pen, Inject 2.4 mg under the skin into the appropriate area as directed Daily., Disp: 15 mL, Rfl: 1  •  prazosin (MINIPRESS) 1 MG capsule, Take 1 capsule by mouth Every Night., Disp: 30 capsule, Rfl: 1    There are no discontinued medications.  Allergies   Allergen Reactions   • Morphine Hallucinations   • Doxycycline Hives     Shortness of air   • Adhesive Tape Rash   • Latex Rash        Social History     Tobacco Use   • Smoking status: Former     Packs/day: 0.50     Years: 17.00     Pack years: 8.50     Types: Cigarettes     Quit date: 2020     Years since quitting: 3.1   • Smokeless tobacco: Never   Vaping Use   • Vaping Use: Never used   Substance Use Topics   • Alcohol use: Not Currently     Comment: OCCASIONAL/SOCIAL   • Drug use: Never       Family History   Problem Relation Age of Onset   • Hyperlipidemia Mother    • Thyroid disease Mother    • Alcohol abuse Father          "Sober 25 years now   • No Known Problems Sister    • No Known Problems Brother    • Alcohol abuse Maternal Aunt         Dead   • No Known Problems Paternal Aunt    • No Known Problems Maternal Uncle    • No Known Problems Paternal Uncle    • No Known Problems Maternal Grandfather    • Arthritis Maternal Grandmother    • COPD Maternal Grandmother         Smoker   • COPD Paternal Grandfather         Smoker   • No Known Problems Paternal Grandmother    • No Known Problems Cousin    • No Known Problems Other    • ADD / ADHD Neg Hx    • Anxiety disorder Neg Hx    • Bipolar disorder Neg Hx    • Dementia Neg Hx    • Depression Neg Hx    • Drug abuse Neg Hx    • OCD Neg Hx    • Paranoid behavior Neg Hx    • Schizophrenia Neg Hx    • Seizures Neg Hx    • Self-Injurious Behavior  Neg Hx    • Suicide Attempts Neg Hx         Objective     /77   Pulse 95   Ht 165.1 cm (65\")   Wt 130 kg (286 lb)   BMI 47.59 kg/m²       Physical Exam    General Appearance:   · no acute distress  · Alert and oriented x3  HENT:   · lips not cyanotic  · Atraumatic  Neck:  · No jvd   · supple  Respiratory:  · no respiratory distress  · normal breath sounds  · no rales  Cardiovascular:  · Regular rate and rhythm  · no S3, no S4   · no murmur  · no rub  Extremities  · No cyanosis  · lower extremity edema: none    Skin:   · warm, dry  · No rashes      Result Review :     No results found for: PROBNP  CMP    CMP 5/31/22 8/18/22   Glucose 83 112 (A)   BUN 18 19   Creatinine 0.60 0.75   eGFR 120.2 106.6   Sodium 137 141   Potassium 4.1 3.5   Chloride 100 103   Calcium 9.6 9.3   Total Protein 7.2 7.0   Albumin 4.60 4.20   Globulin 2.6 2.8   Total Bilirubin <0.2 0.2   Alkaline Phosphatase 88 82   AST (SGOT) 20 16   ALT (SGPT) 18 15   Albumin/Globulin Ratio 1.8 1.5   BUN/Creatinine Ratio 30.0 (A) 25.3 (A)   Anion Gap 14.4 13.2   (A) Abnormal value       Comments are available for some flowsheets but are not being displayed.           CBC w/diff  "   CBC w/Diff 5/31/22 8/18/22 8/25/22   WBC 12.24 (A) 14.27 (A) 9.42   RBC 4.88 4.47 4.70   Hemoglobin 13.0 12.2 12.6   Hematocrit 40.4 37.4 39.7   MCV 82.8 83.7 84.5   MCH 26.6 27.3 26.8   MCHC 32.2 32.6 31.7   RDW 12.8 14.7 13.6   Platelets 336 302 346   Neutrophil Rel % 64.2 75.4 62.3   Immature Granulocyte Rel % 0.5 0.5 0.2   Lymphocyte Rel % 24.0 16.5 (A) 26.9   Monocyte Rel % 8.1 6.7 7.3   Eosinophil Rel % 2.6 0.7 2.8   Basophil Rel % 0.6 0.2 0.5   (A) Abnormal value             Lab Results   Component Value Date    TSH 1.960 05/31/2022      Lab Results   Component Value Date    FREET4 1.02 05/31/2022      No results found for: DDIMERQUANT  Magnesium   Date Value Ref Range Status   08/18/2022 2.0 1.6 - 2.6 mg/dL Final      No results found for: DIGOXIN   Lab Results   Component Value Date    TROPONINT <0.010 08/18/2022           Lipid Panel    Lipid Panel 5/31/22   Total Cholesterol 175   Triglycerides 168 (A)   HDL Cholesterol 40   VLDL Cholesterol 30   LDL Cholesterol  105 (A)   LDL/HDL Ratio 2.54   (A) Abnormal value            No results found for: POCTROP    Results for orders placed during the hospital encounter of 02/21/23    Adult Transthoracic Echo Complete W/ Cont if Necessary Per Protocol    Interpretation Summary  •  The study is technically difficult for diagnosis.  •  Left ventricular systolic function is normal. Estimated left ventricular EF = 55%  •  Left ventricular diastolic function was normal.                 Diagnoses and all orders for this visit:    1. Syncope, unspecified syncope type (Primary)  Assessment & Plan:  Likely secondary to orthostasis or vasovagal syncope controlled with only some intermittent dizziness spells since last visit with lifestyle modification continue to encourage hydration compression stockings and exercises.      2. Orthostatic hypotension          Follow Up     Return in about 6 months (around 8/22/2023) for Follow with Laura Cooper.          Patient was given  instructions and counseling regarding her condition or for health maintenance advice. Please see specific information pulled into the AVS if appropriate.

## 2023-03-01 DIAGNOSIS — F41.1 GENERALIZED ANXIETY DISORDER: ICD-10-CM

## 2023-03-01 DIAGNOSIS — F33.2 SEVERE EPISODE OF RECURRENT MAJOR DEPRESSIVE DISORDER, WITHOUT PSYCHOTIC FEATURES: ICD-10-CM

## 2023-03-01 DIAGNOSIS — F43.10 POST TRAUMATIC STRESS DISORDER (PTSD): ICD-10-CM

## 2023-03-01 RX ORDER — ESCITALOPRAM OXALATE 10 MG/1
10 TABLET ORAL DAILY
Qty: 60 TABLET | Refills: 1 | Status: SHIPPED | OUTPATIENT
Start: 2023-03-01

## 2023-03-01 NOTE — TELEPHONE ENCOUNTER
Caller: Jessie Romero    Relationship: Self    Best call back number: [phone]    Requested Prescriptions:   Requested Prescriptions     Pending Prescriptions Disp Refills   • escitalopram (Lexapro) 10 MG tablet 60 tablet 1     Sig: Take 1 tablet by mouth Daily.      Pharmacy where request should be sent: [facility] [place]     Does the patient have less than a 3 day supply:  [x] Yes  [] No    Would you like a call back once the refill request has been completed: [x] Yes [] No    If the office needs to give you a call back, can they leave a voicemail: [x] Yes [] No    Yenny Lainez Rep   03/01/23 13:40 EST

## 2023-03-07 ENCOUNTER — OFFICE VISIT (OUTPATIENT)
Dept: BEHAVIORAL HEALTH | Facility: CLINIC | Age: 38
End: 2023-03-07
Payer: COMMERCIAL

## 2023-03-07 VITALS
WEIGHT: 288 LBS | DIASTOLIC BLOOD PRESSURE: 82 MMHG | HEART RATE: 79 BPM | SYSTOLIC BLOOD PRESSURE: 124 MMHG | BODY MASS INDEX: 47.98 KG/M2 | HEIGHT: 65 IN

## 2023-03-07 DIAGNOSIS — F51.01 PRIMARY INSOMNIA: ICD-10-CM

## 2023-03-07 DIAGNOSIS — F41.1 GENERALIZED ANXIETY DISORDER: ICD-10-CM

## 2023-03-07 DIAGNOSIS — F43.10 POST TRAUMATIC STRESS DISORDER (PTSD): ICD-10-CM

## 2023-03-07 DIAGNOSIS — F33.2 SEVERE EPISODE OF RECURRENT MAJOR DEPRESSIVE DISORDER, WITHOUT PSYCHOTIC FEATURES: Primary | ICD-10-CM

## 2023-03-07 PROCEDURE — 90833 PSYTX W PT W E/M 30 MIN: CPT

## 2023-03-07 PROCEDURE — 99214 OFFICE O/P EST MOD 30 MIN: CPT

## 2023-03-07 RX ORDER — TRAZODONE HYDROCHLORIDE 50 MG/1
TABLET ORAL
Qty: 60 TABLET | Refills: 1 | Status: SHIPPED | OUTPATIENT
Start: 2023-03-07

## 2023-03-07 NOTE — PATIENT INSTRUCTIONS
1.  Please return to clinic at your next scheduled visit.  Please contact the clinic ([phone]) at least 24 hours prior in the event you need to cancel.  2.  Do no harm to yourself or others.    3.  Avoid alcohol and drugs.    4.  Take all medications as prescribed.  Please contact the clinic with any concerns. If you are in need of medication refills, please call the clinic at [phone].    5. Should you want to get in touch with your provider, LILLIAM Stanford, please contact the office ([phone]), and staff will be able to page Kiersten directly.  6. In the event you have personal crisis, contact the following crisis numbers: Suicide Prevention Hotline 1-884.941.1472; [facility] Helpline 6-860-578-JPOQ; [facility] [phone]; text HELLO to 181023; or 636.     SPECIFIC RECOMMENDATIONS:     1.      Medications discussed at this encounter: DESYREL (TRAZODONE) Risks, benefits, side effects discussed with patient including GI upset, sedation, dizziness/falls risk, grogginess the following day, prolongation of the QTc interval.  After discussion of these risks and benefits, the patient voiced understanding and agreed to proceed.                        2.      Psychotherapy recommendations: We will continue therapy at future visits.     3.     Return to clinic:  1 month
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
## 2023-03-07 NOTE — TELEPHONE ENCOUNTER
PT(PATIENT) HAD AN APPT TODAY WITH BE[facility]AVIORAL [facility], AND STATES SHE IS OUT OF MEDICATION AND NEEDS A REFILL    PENDED FOR PROVIDER REVIEW     PROVIDER PLEASE ADVISE

## 2023-03-10 RX ORDER — CYCLOBENZAPRINE HCL 10 MG
10 TABLET ORAL AS NEEDED
Qty: 30 TABLET | Refills: 1 | OUTPATIENT
Start: 2023-03-10 | End: 2023-03-15 | Stop reason: SDUPTHER

## 2023-03-15 RX ORDER — CYCLOBENZAPRINE HCL 10 MG
10 TABLET ORAL AS NEEDED
Qty: 30 TABLET | Refills: 1 | OUTPATIENT
Start: 2023-03-15

## 2023-03-25 ENCOUNTER — HOSPITAL ENCOUNTER (EMERGENCY)
Facility: HOSPITAL | Age: 38
Discharge: HOME | End: 2023-03-25
Attending: EMERGENCY MEDICINE | Admitting: EMERGENCY MEDICINE
Payer: COMMERCIAL

## 2023-03-25 ENCOUNTER — APPOINTMENT (OUTPATIENT)
Dept: GENERAL RADIOLOGY | Facility: HOSPITAL | Age: 38
End: 2023-03-25
Payer: COMMERCIAL

## 2023-03-25 VITALS
HEIGHT: 65 IN | HEART RATE: 76 BPM | RESPIRATION RATE: 22 BRPM | BODY MASS INDEX: 48.12 KG/M2 | SYSTOLIC BLOOD PRESSURE: 136 MMHG | TEMPERATURE: 98.2 F | WEIGHT: 288.8 LBS | OXYGEN SATURATION: 96 % | DIASTOLIC BLOOD PRESSURE: 73 MMHG

## 2023-03-25 DIAGNOSIS — M79.672 LEFT FOOT PAIN: Primary | ICD-10-CM

## 2023-03-25 PROCEDURE — 99283 EMERGENCY DEPT VISIT LOW MDM: CPT

## 2023-03-25 PROCEDURE — 73630 X-RAY EXAM OF FOOT: CPT

## 2023-03-25 NOTE — DISCHARGE INSTRUCTIONS
The x-ray completed in the emergency department today shows no fracture.  You may utilize the boot as directed when up walking.  Please rest and elevate the foot is much as possible.  Apply ice 15 to 20 minutes at a time 4-5 times a day as needed for pain and swelling.  He may also alternate Tylenol or ibuprofen as needed for pain.  If your symptoms has not improved within 1 week please follow-up with your primary care provider.

## 2023-03-25 NOTE — ED PROVIDER NOTES
Time: 5:15 PM EDT  Date of encounter:  3/25/2023  Independent Historian/Clinical History and Information was obtained by:   Patient  Chief Complaint: Left foot pain    History is limited by: N/A    History of Present Illness:  Patient is a 37 y.o. year old female who presents to the emergency department for evaluation of left foot pain.  Patient states that this morning she was walking her dog and she had her left heel planted in the front of her foot twisted.  Patient states that when this occurred she felt a pop across the top of her left foot.  Patient states she applied a compression sock so she could work today which did improve her pain however patient states with any weightbearing the pain becomes severe.  Patient rates pain with standing 9 out of 10 and at rest rated 6 out of 10.  Patient does admit to previous fracture of the left great toe but no other injury to the left foot previously.    HPI    Patient Care Team  Primary Care Provider: Hanna Pereira MD    Past Medical History:     Allergies   Allergen Reactions   • Morphine Hallucinations   • Doxycycline Hives     Shortness of air   • Adhesive Tape Rash     CANNOT USE EKG ADHESIVE STRIPS/RASH & ITCHING   • Latex Rash     Past Medical History:   Diagnosis Date   • Allergic 2000    Moved to [place]   • Headache 2002   • Heart murmur    • Myocardial infarction (HCC) 03/2010     Past Surgical History:   Procedure Laterality Date   • ADRENAL GLAND SURGERY Bilateral 01/01/2010    [facility] [place] DR [facility]     Family History   Problem Relation Age of Onset   • Hyperlipidemia Mother    • Thyroid disease Mother    • Alcohol abuse Father         Sober 25 years now   • No Known Problems Sister    • No Known Problems Brother    • Alcohol abuse Maternal Aunt         Dead   • No Known Problems Paternal Aunt    • No Known Problems Maternal Uncle    • No Known Problems Paternal Uncle    • No Known Problems Maternal Grandfather    • Arthritis Maternal  Grandmother    • COPD Maternal Grandmother         Smoker   • COPD Paternal Grandfather         Smoker   • No Known Problems Paternal Grandmother    • No Known Problems Cousin    • No Known Problems Other    • ADD / ADHD Neg Hx    • Anxiety disorder Neg Hx    • Bipolar disorder Neg Hx    • Dementia Neg Hx    • Depression Neg Hx    • Drug abuse Neg Hx    • OCD Neg Hx    • Paranoid behavior Neg Hx    • Schizophrenia Neg Hx    • Seizures Neg Hx    • Self-Injurious Behavior  Neg Hx    • Suicide Attempts Neg Hx        Home Medications:  Prior to Admission medications    Medication Sig Start Date End Date Taking? Authorizing Provider   busPIRone (BUSPAR) 10 MG tablet Take 1 tablet by mouth 3 (Three) Times a Day. 2/13/23   Kiersten Gallardo APRN   cyclobenzaprine (FLEXERIL) 10 MG tablet Take 1 tablet by mouth As Needed for Muscle Spasms. 3/15/23   Hanna Pereira MD   escitalopram (Lexapro) 10 MG tablet Take 1 tablet by mouth Daily. 3/1/23   Hanna Pereira MD   fexofenadine (Allegra Allergy) 180 MG tablet Take 1 tablet by mouth Daily. 8/16/21   Karen Durand MD   Insulin Pen Needle (Pen Needles) 32G X 5 MM misc 1 each Daily. For use with Saxenda 11/17/22   Hanna Pereira MD   Liraglutide (SAXENDA) 18 MG/3ML injection pen Inject 2.4 mg under the skin into the appropriate area as directed Daily. 2/16/23   Hanna Pereira MD   traZODone (DESYREL) 50 MG tablet Take 1-2 tablets by mouth at bedtime 3/7/23   Kiersten Gallardo APRN        Social History:   Social History     Tobacco Use   • Smoking status: Former     Packs/day: 0.50     Years: 17.00     Pack years: 8.50     Types: Cigarettes     Quit date: 2020     Years since quitting: 3.2   • Smokeless tobacco: Never   Vaping Use   • Vaping Use: Never used   Substance Use Topics   • Alcohol use: Not Currently     Comment: OCCASIONAL/SOCIAL   • Drug use: Never         Review of Systems:  Review of Systems   Musculoskeletal:  "Positive for arthralgias. Negative for joint swelling.   Skin: Negative for wound.   All other systems reviewed and are negative.       Physical Exam:  /73 (BP Location: Right arm, Patient Position: Sitting)   Pulse 76   Temp 98.2 °F (36.8 °C) (Oral)   Resp 22   Ht 165.1 cm (65\")   Wt 131 kg (288 lb 12.8 oz)   SpO2 96%   BMI 48.06 kg/m²     Physical Exam  Vitals and nursing note reviewed.   Constitutional:       General: She is not in acute distress.     Appearance: Normal appearance. She is not ill-appearing, toxic-appearing or diaphoretic.   HENT:      Head: Normocephalic and atraumatic.   Eyes:      Extraocular Movements: Extraocular movements intact.      Pupils: Pupils are equal, round, and reactive to light.   Pulmonary:      Effort: Pulmonary effort is normal.   Abdominal:      General: Abdomen is flat. There is no distension.   Musculoskeletal:      Cervical back: Normal range of motion and neck supple.      Left foot: Normal range of motion and normal capillary refill. Tenderness and bony tenderness present. No swelling, deformity or laceration. Normal pulse.        Legs:    Skin:     General: Skin is warm and dry.   Neurological:      General: No focal deficit present.      Mental Status: She is alert and oriented to person, place, and time.   Psychiatric:         Mood and Affect: Mood normal.         Behavior: Behavior normal.                  Procedures:  Procedures      Medical Decision Making:    Comorbidities that affect care:    None    External Notes reviewed:    None      The following orders were placed and all results were independently analyzed by me:  Orders Placed This Encounter   Procedures   • [facility] 10.  Post Op Shoe   • XR Foot 3+ View Left       Medications Given in the Emergency Department:  Medications - No data to display     ED Course:         Labs:    Lab Results (last 24 hours)     ** No results found for the last 24 hours. **           Imaging:    XR " Foot 3+ View Left    Result Date: 3/25/2023  PROCEDURE: XR FOOT 3+ VW LEFT  COMPARISON: [facility], CR, FOOT >OR= 3V LT, 3/25/2015, 14:41.  INDICATIONS: Left foot injury  FINDINGS:  BONES: Plantar calcaneal enthesophyte.  No fractures or acute osseous abnormalities are identified in the left foot. SOFT TISSUES: Negative.  No visible soft tissue swelling.  EFFUSION: None visible.  OTHER: Negative.        No acute osseous abnormalities are identified in the left foot.      KIN CONROY MD       Electronically Signed and Approved By: KIN CONROY MD on 3/25/2023 at 17:42                 Differential Diagnosis and Discussion:    Joint Pain: Differential diagnosis includes but is not limited to polyarticular arthritis, gout, tendinitis, hemarthrosis, septic arthritis, rheumatoid arthritis, bursitis, degenerative joint disease, joint effusion, autoimmune disorder, trauma, and occult neoplasm.    All X-rays were independently reviewed by me.    MDM  Number of Diagnoses or Management Options  Left foot pain  Diagnosis management comments: Patient presented to the emergency department with left foot pain after twisting a popping injury this AM.  X-ray was completed with no acute findings.  Patient was given postop shoe in the emergency department to provide improved pain with weightbearing.       Amount and/or Complexity of Data Reviewed  Tests in the radiology section of CPT®: reviewed and ordered    Risk of Complications, Morbidity, and/or Mortality  Presenting problems: moderate  Diagnostic procedures: low  Management options: low    Patient Progress  Patient progress: stable       Patient Care Considerations:    NARCOTICS: I considered prescribing opiate pain medication as an outpatient, however No fractures noted on patient's x-ray patient's pain is controlled without intervention      Consultants/Shared Management Plan:    None    Social Determinants of Health:    Patient is independent, reliable, and has  access to care.       Disposition and Care Coordination:    Discharged: The patient is suitable and stable for discharge with no need for consideration of observation or admission.    I have explained the patient´s condition, diagnoses and treatment plan based on the information available to me at this time. I have answered questions and addressed any concerns. The patient has a good  understanding of the patient´s diagnosis, condition, and treatment plan as can be expected at this point. The vital signs have been stable. The patient´s condition is stable and appropriate for discharge from the emergency department.      The patient will pursue further outpatient evaluation with the primary care physician or other designated or consulting physician as outlined in the discharge instructions. They are agreeable to this plan of care and follow-up instructions have been explained in detail. The patient has received these instructions in written format and have expressed an understanding of the discharge instructions. The patient is aware that any significant change in condition or worsening of symptoms should prompt an immediate return to this or the closest emergency department or call to 911.    Final diagnoses:   Left foot pain        ED Disposition     ED Disposition   Discharge    Condition   Stable    Comment   --             This medical record created using voice recognition software.           Maximo Moy PA-C  03/25/23 [id number]

## 2023-04-04 ENCOUNTER — OFFICE VISIT (OUTPATIENT)
Dept: BEHAVIORAL HEALTH | Facility: CLINIC | Age: 38
End: 2023-04-04
Payer: COMMERCIAL

## 2023-04-04 VITALS
DIASTOLIC BLOOD PRESSURE: 72 MMHG | BODY MASS INDEX: 48.28 KG/M2 | SYSTOLIC BLOOD PRESSURE: 112 MMHG | WEIGHT: 289.8 LBS | HEIGHT: 65 IN | HEART RATE: 76 BPM

## 2023-04-04 DIAGNOSIS — F41.1 GENERALIZED ANXIETY DISORDER: ICD-10-CM

## 2023-04-04 DIAGNOSIS — F51.01 PRIMARY INSOMNIA: ICD-10-CM

## 2023-04-04 DIAGNOSIS — F33.2 SEVERE EPISODE OF RECURRENT MAJOR DEPRESSIVE DISORDER, WITHOUT PSYCHOTIC FEATURES: Primary | ICD-10-CM

## 2023-04-04 DIAGNOSIS — F43.10 POST TRAUMATIC STRESS DISORDER (PTSD): ICD-10-CM

## 2023-04-04 PROCEDURE — 90836 PSYTX W PT W E/M 45 MIN: CPT

## 2023-04-04 PROCEDURE — 99214 OFFICE O/P EST MOD 30 MIN: CPT

## 2023-04-04 RX ORDER — BUSPIRONE HYDROCHLORIDE 5 MG/1
5 TABLET ORAL 2 TIMES DAILY PRN
Qty: 60 TABLET | Refills: 1 | Status: SHIPPED | OUTPATIENT
Start: 2023-04-04
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
## 2023-04-04 NOTE — PATIENT INSTRUCTIONS
1.  Please return to clinic at your next scheduled visit.  Please contact the clinic ([phone]) at least 24 hours prior in the event you need to cancel.  2.  Do no harm to yourself or others.    3.  Avoid alcohol and drugs.    4.  Take all medications as prescribed.  Please contact the clinic with any concerns. If you are in need of medication refills, please call the clinic at [phone].    5. Should you want to get in touch with your provider, LILLIAM Stanford, please contact the office ([phone]), and staff will be able to page Kiersten directly.  6. In the event you have personal crisis, contact the following crisis numbers: Suicide Prevention Hotline 1-249.960.4575; [facility] Helpline 8-881-232-BNVC; [facility] Emergency Room [phone]; text HELLO to 121742; or 513.     SPECIFIC RECOMMENDATIONS:     1.      Medications discussed at this encounter: Lexapro, trazodone, BuSpar                    2.      Psychotherapy recommendations: We will continue therapy at future visits.     3.     Return to clinic:  1 month

## 2023-04-24 ENCOUNTER — OFFICE VISIT (OUTPATIENT)
Dept: INTERNAL MEDICINE | Facility: CLINIC | Age: 38
End: 2023-04-24
Payer: COMMERCIAL

## 2023-04-24 VITALS
TEMPERATURE: 97.7 F | WEIGHT: 260.8 LBS | DIASTOLIC BLOOD PRESSURE: 80 MMHG | HEART RATE: 92 BPM | HEIGHT: 65 IN | OXYGEN SATURATION: 97 % | SYSTOLIC BLOOD PRESSURE: 118 MMHG | BODY MASS INDEX: 43.45 KG/M2

## 2023-04-24 DIAGNOSIS — Z13.29 THYROID DISORDER SCREEN: ICD-10-CM

## 2023-04-24 DIAGNOSIS — R55 SYNCOPE, UNSPECIFIED SYNCOPE TYPE: ICD-10-CM

## 2023-04-24 DIAGNOSIS — Z13.220 LIPID SCREENING: ICD-10-CM

## 2023-04-24 DIAGNOSIS — I95.1 ORTHOSTATIC HYPOTENSION: Primary | ICD-10-CM

## 2023-04-24 DIAGNOSIS — E66.01 CLASS 3 SEVERE OBESITY DUE TO EXCESS CALORIES WITHOUT SERIOUS COMORBIDITY WITH BODY MASS INDEX (BMI) OF 45.0 TO 49.9 IN ADULT: ICD-10-CM

## 2023-04-24 DIAGNOSIS — G43.709 CHRONIC MIGRAINE W/O AURA W/O STATUS MIGRAINOSUS, NOT INTRACTABLE: ICD-10-CM

## 2023-04-24 LAB
ALBUMIN SERPL-MCNC: 4.2 G/DL (ref 3.5–5.2)
ALBUMIN/GLOB SERPL: 1.6 G/DL
ALP SERPL-CCNC: 80 U/L (ref 39–117)
ALT SERPL W P-5'-P-CCNC: 15 U/L (ref 1–33)
ANION GAP SERPL CALCULATED.3IONS-SCNC: 7.1 MMOL/L (ref 5–15)
AST SERPL-CCNC: 12 U/L (ref 1–32)
BASOPHILS # BLD AUTO: 0.05 10*3/MM3 (ref 0–0.2)
BASOPHILS NFR BLD AUTO: 0.5 % (ref 0–1.5)
BILIRUB SERPL-MCNC: <0.2 MG/DL (ref 0–1.2)
BUN SERPL-MCNC: 19 MG/DL (ref 6–20)
BUN/CREAT SERPL: 26 (ref 7–25)
CALCIUM SPEC-SCNC: 9.8 MG/DL (ref 8.6–10.5)
CHLORIDE SERPL-SCNC: 102 MMOL/L (ref 98–107)
CHOLEST SERPL-MCNC: 174 MG/DL (ref 0–200)
CO2 SERPL-SCNC: 28.9 MMOL/L (ref 22–29)
CREAT SERPL-MCNC: 0.73 MG/DL (ref 0.57–1)
DEPRECATED RDW RBC AUTO: 38.9 FL (ref 37–54)
EGFRCR SERPLBLD CKD-EPI 2021: 109.5 ML/MIN/1.73
EOSINOPHIL # BLD AUTO: 0.31 10*3/MM3 (ref 0–0.4)
EOSINOPHIL NFR BLD AUTO: 3 % (ref 0.3–6.2)
ERYTHROCYTE [DISTWIDTH] IN BLOOD BY AUTOMATED COUNT: 12.9 % (ref 12.3–15.4)
GLOBULIN UR ELPH-MCNC: 2.7 GM/DL
GLUCOSE SERPL-MCNC: 77 MG/DL (ref 65–99)
HBA1C MFR BLD: 5.4 % (ref 4.8–5.6)
HCT VFR BLD AUTO: 38.3 % (ref 34–46.6)
HDLC SERPL-MCNC: 44 MG/DL (ref 40–60)
HGB BLD-MCNC: 12.5 G/DL (ref 12–15.9)
IMM GRANULOCYTES # BLD AUTO: 0.05 10*3/MM3 (ref 0–0.05)
IMM GRANULOCYTES NFR BLD AUTO: 0.5 % (ref 0–0.5)
LDLC SERPL CALC-MCNC: 99 MG/DL (ref 0–100)
LDLC/HDLC SERPL: 2.13 {RATIO}
LYMPHOCYTES # BLD AUTO: 3.01 10*3/MM3 (ref 0.7–3.1)
LYMPHOCYTES NFR BLD AUTO: 28.7 % (ref 19.6–45.3)
MCH RBC QN AUTO: 27.4 PG (ref 26.6–33)
MCHC RBC AUTO-ENTMCNC: 32.6 G/DL (ref 31.5–35.7)
MCV RBC AUTO: 84 FL (ref 79–97)
MONOCYTES # BLD AUTO: 0.85 10*3/MM3 (ref 0.1–0.9)
MONOCYTES NFR BLD AUTO: 8.1 % (ref 5–12)
NEUTROPHILS NFR BLD AUTO: 59.2 % (ref 42.7–76)
NEUTROPHILS NFR BLD AUTO: 6.21 10*3/MM3 (ref 1.7–7)
NRBC BLD AUTO-RTO: 0 /100 WBC (ref 0–0.2)
PLATELET # BLD AUTO: 323 10*3/MM3 (ref 140–450)
PMV BLD AUTO: 10.5 FL (ref 6–12)
POTASSIUM SERPL-SCNC: 4 MMOL/L (ref 3.5–5.2)
PROT SERPL-MCNC: 6.9 G/DL (ref 6–8.5)
RBC # BLD AUTO: 4.56 10*6/MM3 (ref 3.77–5.28)
SODIUM SERPL-SCNC: 138 MMOL/L (ref 136–145)
TRIGL SERPL-MCNC: 181 MG/DL (ref 0–150)
TSH SERPL DL<=0.05 MIU/L-ACNC: 1.06 UIU/ML (ref 0.27–4.2)
VLDLC SERPL-MCNC: 31 MG/DL (ref 5–40)
WBC NRBC COR # BLD: 10.48 10*3/MM3 (ref 3.4–10.8)

## 2023-04-24 PROCEDURE — 80050 GENERAL HEALTH PANEL: CPT | Performed by: NURSE PRACTITIONER

## 2023-04-24 PROCEDURE — 83036 HEMOGLOBIN GLYCOSYLATED A1C: CPT | Performed by: NURSE PRACTITIONER

## 2023-04-24 PROCEDURE — 80061 LIPID PANEL: CPT | Performed by: NURSE PRACTITIONER

## 2023-04-24 RX ORDER — SEMAGLUTIDE 0.5 MG/.5ML
0.5 INJECTION, SOLUTION SUBCUTANEOUS WEEKLY
Qty: 2 ML | Refills: 0 | Status: SHIPPED | OUTPATIENT
Start: 2023-05-29

## 2023-04-24 RX ORDER — SEMAGLUTIDE 0.25 MG/.5ML
0.25 INJECTION, SOLUTION SUBCUTANEOUS WEEKLY
Qty: 2 ML | Refills: 0 | Status: SHIPPED | OUTPATIENT
Start: 2023-04-24

## 2023-04-24 NOTE — PROGRESS NOTES
"Chief Complaint  Migraine (Pt here to f/u on migraine issue. Pt states she had blackout episode x 3 weeks ago she would like to discuss)    Subjective        Jessie Romero presents to [facility] for follow-up for chronic conditions and concerns about migraine.  Patient reports that her migraines are still present, she was previously using Toradol, but she is unable to continue this treatment as her insurance does not approve it for acute needs such as migraines.  She has not tried any other medications that she can recall other than over-the-counter medications like Excedrin, ibuprofen, Tylenol, which are minimally effective.  She averages 1 headache per week, it was previously more than that, but since moving out into her own house she is doing much better.  She also reports that since moving out her mood is increased, her anxiety has lessened, she has not had any additional syncopal episodes, which were happening quite often, thought to be possibly stress and anxiety related.  She is still actively trying to lose weight, she has had a little bit of a hiccup, this was due to moving, and unable to cook normal regular healthy food.  She is getting ready to start back on Saxenda, which has worked okay in the past.    Objective   Vital Signs:   /80 (BP Location: Right arm, Patient Position: Sitting, Cuff Size: Large Adult)   Pulse 92   Temp 97.7 °F (36.5 °C) (Temporal)   Ht 165.1 cm (65\")   Wt 118 kg (260 lb 12.8 oz)   SpO2 97%   BMI 43.40 kg/m²     Physical Exam  Vitals and nursing note reviewed.   Constitutional:       Appearance: Normal appearance. She is obese.   HENT:      Head: Normocephalic and atraumatic.      Right Ear: External ear normal.      Left Ear: External ear normal.   Eyes:      Pupils: Pupils are equal, round, and reactive to light.   Cardiovascular:      Rate and Rhythm: Normal rate and regular rhythm.   Pulmonary:      Effort: Pulmonary effort is " normal.      Breath sounds: Normal breath sounds.   Neurological:      General: No focal deficit present.      Mental Status: She is alert.   Psychiatric:         Mood and Affect: Mood normal.         Thought Content: Thought content normal.        Result Review :  {The following data was reviewed by LILLIAM Waters on 04/24/23                Diagnoses and all orders for this visit:    1. Orthostatic hypotension (Primary)    2. Class 3 severe obesity due to excess calories without serious comorbidity with body mass index (BMI) of 45.0 to 49.9 in adult  -     CBC & Differential  -     Comprehensive Metabolic Panel  -     Hemoglobin A1c  -     TSH    3. Syncope, unspecified syncope type    4. Chronic migraine w/o aura w/o status migrainosus, not intractable    5. Lipid screening  -     Lipid Panel    6. Thyroid disorder screen  -     TSH    Other orders  -     Rimegepant Sulfate (NURTEC) 75 MG tablet dispersible tablet; Take 1 tablet by mouth As Needed (every other day as needed for migraine).  Dispense: 16 tablet; Refill: 0  -     Semaglutide-Weight Management (Wegovy) 0.25 MG/0.5ML solution auto-injector; Inject 0.25 mg under the skin into the appropriate area as directed 1 (One) Time Per Week.  Dispense: 2 mL; Refill: 0  -     Semaglutide-Weight Management (Wegovy) 0.5 MG/0.5ML solution auto-injector; Inject 0.5 mL under the skin into the appropriate area as directed 1 (One) Time Per Week.  Dispense: 2 mL; Refill: 0    Patient doing much better from her syncopal/orthostatic episodes, continue to monitor, follow-up as needed.  We will put Nurtec in, she can use this as abortive treatment only for now.  Discussed preventative measures, and that we could transition to Nurtec as a preventative if needed, but just use as needed now.  Continue with weight loss, I will also switch patient over to Wegovy due to once weekly dosing and a little bit better profile for weight loss.  Follow-up in roughly 12  weeks.      Follow Up   Return in about 3 months (around 7/24/2023) for Recheck.  Patient was given instructions and counseling regarding her condition or for health maintenance advice. Please see specific information pulled into the AVS if appropriate.     Mamadou Richardson, LILLIAM  4/24/2023  This note was electronically signed.

## 2023-04-27 RX ORDER — CYCLOBENZAPRINE HCL 10 MG
10 TABLET ORAL AS NEEDED
Qty: 30 TABLET | Refills: 1 | OUTPATIENT
Start: 2023-04-27

## 2023-05-03 ENCOUNTER — TELEPHONE (OUTPATIENT)
Dept: INTERNAL MEDICINE | Facility: CLINIC | Age: 38
End: 2023-05-03
Payer: COMMERCIAL

## 2023-05-03 NOTE — TELEPHONE ENCOUNTER
Caller: [facility]    Relationship to patient: Other    Best call back number: [phone]    Patient is needing: CALLED TO REPORT THEY HAVE FAXED OVER A FORM FOR ADDITIONAL INFORMATION FOR THE PRIOR AUTHORIZATION FOR WEGOVY.

## 2023-05-09 ENCOUNTER — PRIOR AUTHORIZATION (OUTPATIENT)
Dept: INTERNAL MEDICINE | Facility: CLINIC | Age: 38
End: 2023-05-09
Payer: COMMERCIAL

## 2023-05-09 ENCOUNTER — OFFICE VISIT (OUTPATIENT)
Dept: BEHAVIORAL HEALTH | Facility: CLINIC | Age: 38
End: 2023-05-09
Payer: COMMERCIAL

## 2023-05-09 VITALS
SYSTOLIC BLOOD PRESSURE: 112 MMHG | HEART RATE: 71 BPM | DIASTOLIC BLOOD PRESSURE: 82 MMHG | BODY MASS INDEX: 48.82 KG/M2 | HEIGHT: 65 IN | WEIGHT: 293 LBS

## 2023-05-09 DIAGNOSIS — F51.01 PRIMARY INSOMNIA: ICD-10-CM

## 2023-05-09 DIAGNOSIS — F41.1 GENERALIZED ANXIETY DISORDER: ICD-10-CM

## 2023-05-09 DIAGNOSIS — F43.10 POST TRAUMATIC STRESS DISORDER (PTSD): ICD-10-CM

## 2023-05-09 DIAGNOSIS — F33.2 SEVERE EPISODE OF RECURRENT MAJOR DEPRESSIVE DISORDER, WITHOUT PSYCHOTIC FEATURES: Primary | ICD-10-CM

## 2023-05-09 RX ORDER — TRAZODONE HYDROCHLORIDE 50 MG/1
50-100 TABLET ORAL
Qty: 60 TABLET | Refills: 1 | Status: SHIPPED | OUTPATIENT
Start: 2023-05-09

## 2023-05-09 RX ORDER — ESCITALOPRAM OXALATE 10 MG/1
10 TABLET ORAL DAILY
Qty: 60 TABLET | Refills: 1 | Status: SHIPPED | OUTPATIENT
Start: 2023-05-09

## 2023-05-09 RX ORDER — BUSPIRONE HYDROCHLORIDE 5 MG/1
5 TABLET ORAL 2 TIMES DAILY PRN
Qty: 60 TABLET | Refills: 1 | Status: SHIPPED | OUTPATIENT
Start: 2023-05-09
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
## 2023-05-09 NOTE — PATIENT INSTRUCTIONS
1.  Please return to clinic at your next scheduled visit.  Please contact the clinic ([phone]) at least 24 hours prior in the event you need to cancel.  2.  Do no harm to yourself or others.    3.  Avoid alcohol and drugs.    4.  Take all medications as prescribed.  Please contact the clinic with any concerns. If you are in need of medication refills, please call the clinic at [phone].    5. Should you want to get in touch with your provider, LILLIAM Stanford, please contact the office ([phone]), and staff will be able to page Kiersten directly.  6. In the event you have personal crisis, contact the following crisis numbers: Suicide Prevention Hotline 1-584.606.1663; [facility] Helpline 8-412-523-EXCV; [facility] Emergency Room [phone]; text HELLO to 960292; or 035.     SPECIFIC RECOMMENDATIONS:     1.      Medications discussed at this encounter:                     2.      Psychotherapy recommendations: We will continue therapy at future visits.     3.     Return to clinic:  6 weeks

## 2023-05-09 NOTE — TELEPHONE ENCOUNTER
Message from plan: PA Case: [id number], Status: Approved, Coverage Starts on: 5/9/2023 12:00 AM, Coverage Ends on: 5/9/2024 12:00 AM. Questions? Contact [phone].

## 2023-05-17 ENCOUNTER — TELEPHONE (OUTPATIENT)
Dept: INTERNAL MEDICINE | Facility: CLINIC | Age: 38
End: 2023-05-17
Payer: COMMERCIAL

## 2023-05-17 NOTE — TELEPHONE ENCOUNTER
Pt called back and let me know that she has not been able to fill her medications yet due to no PA at the pharmacy. So I contacted the  [facility] [place] pharmacy and provide them the PA info and they are now filling her medications.

## 2023-05-17 NOTE — TELEPHONE ENCOUNTER
Pt was calling regarding her wegovy and I noticed that it was future dated to 5/29, was there a reason for this? [facility]. [facility]    I Left her a VM to tell her that after talking to Mamadou I found out there were 2 Wegovy RX that were sent in. A starter dose and then the one on 5/29 was the next dose she should be starting after 5/29. If she is out of the Nurtec already she may be using it to much.

## 2023-05-30 ENCOUNTER — NURSE TRIAGE (OUTPATIENT)
Dept: CALL CENTER | Facility: HOSPITAL | Age: 38
End: 2023-05-30

## 2023-05-30 ENCOUNTER — OFFICE VISIT (OUTPATIENT)
Dept: INTERNAL MEDICINE | Facility: CLINIC | Age: 38
End: 2023-05-30

## 2023-05-30 VITALS
HEART RATE: 74 BPM | HEIGHT: 65 IN | OXYGEN SATURATION: 97 % | BODY MASS INDEX: 48.82 KG/M2 | WEIGHT: 293 LBS | DIASTOLIC BLOOD PRESSURE: 62 MMHG | SYSTOLIC BLOOD PRESSURE: 100 MMHG | TEMPERATURE: 98.2 F

## 2023-05-30 DIAGNOSIS — R06.09 DYSPNEA ON EXERTION: Primary | ICD-10-CM

## 2023-05-30 PROCEDURE — 99214 OFFICE O/P EST MOD 30 MIN: CPT | Performed by: PHYSICIAN ASSISTANT

## 2023-05-30 RX ORDER — FLUTICASONE PROPIONATE 50 MCG
2 SPRAY, SUSPENSION (ML) NASAL DAILY
Qty: 16 G | Refills: 2 | Status: SHIPPED | OUTPATIENT
Start: 2023-05-30

## 2023-05-30 RX ORDER — ALBUTEROL SULFATE 90 UG/1
2 AEROSOL, METERED RESPIRATORY (INHALATION) EVERY 4 HOURS PRN
Qty: 8.5 G | Refills: 2 | Status: SHIPPED | OUTPATIENT
Start: 2023-05-30

## 2023-05-30 NOTE — ASSESSMENT & PLAN NOTE
Concern for possible asthma, possibly allergy induced asthma.  Therefore, would like to get a CXR in office to check for abnormalities. Will place referral for asthma/allergy speciality.  Will go ahead and send in albuterol inhaler to use as needed, and add flonase to allegra.  If symptoms persist or worsen patient needs to return.

## 2023-05-30 NOTE — TELEPHONE ENCOUNTER
Call transferred from [facility].  Caller has had intermittent mid back pain that at times makes it feel hard to breathe.  It has been going on for 6 months.  She does have an occasional cough that will produce sputum.  Warm transfer to Tila in the office.

## 2023-05-30 NOTE — TELEPHONE ENCOUNTER
Reason for Disposition   [1] MODERATE back pain (e.g., interferes with normal activities) AND [2] present > 3 days    Additional Information   Negative: Passed out (i.e., lost consciousness, collapsed and was not responding)   Negative: Shock suspected (e.g., cold/pale/clammy skin, too weak to stand, low BP, rapid pulse)   Negative: Sounds like a life-threatening emergency to the triager   Negative: Major injury to the back (e.g., MVA, fall > 10 feet or 3 meters, penetrating injury, etc.)   Negative: Followed a tailbone injury   Negative: [1] Pain in the upper back over the ribs (rib cage) AND [2] radiates (travels, goes) into chest   Negative: [1] Pain in the upper back over the ribs (rib cage) AND [2] worsened by coughing (or clearly increases with breathing)   Negative: Back pain during pregnancy   Negative: Pain mainly in flank (i.e., in the side, over the lower ribs or just below the ribs)   Negative: [1] SEVERE back pain (e.g., excruciating) AND [2] sudden onset AND [3] age > 60 years   Negative: [1] Unable to urinate (or only a few drops) > 4 hours AND [2] bladder feels very full (e.g., palpable bladder or strong urge to urinate)   Negative: [1] Loss of bladder or bowel control (urine or bowel incontinence; wetting self, leaking stool) AND [2] new-onset   Negative: Numbness in groin or rectal area (i.e., loss of sensation)   Negative: [1] SEVERE abdominal pain AND [2] present > 1 hour   Negative: [1] Abdominal pain AND [2] age > 60 years   Negative: Weakness of a leg or foot (e.g., unable to bear weight, dragging foot)   Negative: Unable to walk   Negative: Patient sounds very sick or weak to the triager   Negative: [1] SEVERE back pain (e.g., excruciating, unable to do any normal activities) AND [2] not improved 2 hours after pain medicine   Negative: [1] Pain radiates into the thigh or further down the leg AND [2] both legs   Negative: [1] Fever > 100.0 F (37.8 C) AND [2] flank pain (i.e., in side, below  "ribs and above hip)   Negative: [1] Pain or burning with passing urine (urination) AND [2] flank pain (i.e., in side, below ribs and above hip)   Negative: Numbness in a leg or foot (i.e., loss of sensation)   Negative: [1] Numbness in an arm or hand (i.e., loss of sensation) AND [2] upper back pain   Negative: High-risk adult (e.g., history of cancer, HIV, or IV drug use)   Negative: Soft tissue infection (e.g., abscess, cellulitis) or other serious infection (e.g., bacteremia) in last 2 weeks   Negative: [1] Fever AND [2] no symptoms of UTI  (Exception: Has generalized muscle pains, not localized back pain.)   Negative: Rash in same area as pain (may be described as \"small blisters\")   Negative: Blood in urine (red, pink, or tea-colored)    Answer Assessment - Initial Assessment Questions  1. ONSET: \"When did the pain begin?\"     Off an on for 6 months  2. LOCATION: \"Where does it hurt?\" (upper, mid or lower back)      Mid back around ribs  3. SEVERITY: \"How bad is the pain?\"  (e.g., Scale 1-10; mild, moderate, or severe)    - MILD (1-3): Doesn't interfere with normal activities.     - MODERATE (4-7): Interferes with normal activities or awakens from sleep.     - SEVERE (8-10): Excruciating pain, unable to do any normal activities.       Mild to mod  4. PATTERN: \"Is the pain constant?\" (e.g., yes, no; constant, intermittent)       intermittent  5. RADIATION: \"Does the pain shoot into your legs or somewhere else?\"      no  6. CAUSE:  \"What do you think is causing the back pain?\"       Not sure  7. BACK OVERUSE:  \"Any recent lifting of heavy objects, strenuous work or exercise?\"      no  8. MEDICINES: \"What have you taken so far for the pain?\" (e.g., nothing, acetaminophen, NSAIDS)      na  9. NEUROLOGIC SYMPTOMS: \"Do you have any weakness, numbness, or problems with bowel/bladder control?\"      no  10. OTHER SYMPTOMS: \"Do you have any other symptoms?\" (e.g., fever, abdomen pain, burning with urination, blood in " "urine)        Sometimes feel like it is hard to take a deep breath when happening  11. PREGNANCY: \"Is there any chance you are pregnant?\" \"When was your last menstrual period?\"        na    Protocols used: Back Pain-ADULT-AH    "

## 2023-05-30 NOTE — PROGRESS NOTES
"Chief Complaint  URI (Chest tightness /Hurts to breath) and Nasal Congestion    Subjective          Jessie Romero presents to [facility]  History of Present Illness  Chest tightness/nasal congestion- patient states that for the past 6 months she has noticed some \"difficulty breathing\" that almost feels like she isn't struggling but can't take a full breath.  She does have a history of bronchitis, no history of asthma that she is aware of.  Patient has had atypical pneumonia a few times.  Previous smoker, non-smoker for the past 3-4 years.  She has noticed more dyspnea on exertion.  Patient states that there are days she feels like she's wheezing.  Patient moved a couple weeks ago away from house with mold.       Objective   Vital Signs:   /62 (BP Location: Left arm, Patient Position: Sitting, Cuff Size: Large Adult)   Pulse 74   Temp 98.2 °F (36.8 °C) (Temporal)   Ht 165.1 cm (65\")   Wt 134 kg (295 lb)   SpO2 97%   BMI 49.09 kg/m²     Physical Exam  Vitals reviewed.   Constitutional:       Appearance: Normal appearance. She is well-developed.   HENT:      Head: Normocephalic and atraumatic.      Right Ear: Tympanic membrane, ear canal and external ear normal.      Left Ear: Tympanic membrane, ear canal and external ear normal.   Eyes:      Conjunctiva/sclera: Conjunctivae normal.      Pupils: Pupils are equal, round, and reactive to light.   Cardiovascular:      Rate and Rhythm: Normal rate and regular rhythm.      Heart sounds: No murmur heard.    No friction rub. No gallop.   Pulmonary:      Effort: Pulmonary effort is normal.      Breath sounds: Normal breath sounds. No wheezing or rhonchi.   Skin:     General: Skin is warm and dry.   Neurological:      Mental Status: She is alert and oriented to person, place, and time.      Cranial Nerves: No cranial nerve deficit.   Psychiatric:         Mood and Affect: Mood and affect normal.         " Behavior: Behavior normal.         Thought Content: Thought content normal.         Judgment: Judgment normal.        Result Review :          Procedures      Assessment and Plan    Diagnoses and all orders for this visit:    1. Dyspnea on exertion (Primary)  Assessment & Plan:  Concern for possible asthma, possibly allergy induced asthma.  Therefore, would like to get a CXR in office to check for abnormalities. Will place referral for asthma/allergy speciality.  Will go ahead and send in albuterol inhaler to use as needed, and add flonase to allegra.  If symptoms persist or worsen patient needs to return.    Orders:  -     XR Chest PA & Lateral (In Office)  -     Ambulatory Referral to Allergy    Other orders  -     fluticasone (FLONASE) 50 MCG/ACT nasal spray; instill 2 sprays in each nostril once daily  Dispense: 16 g; Refill: 2  -     albuterol sulfate  (90 Base) MCG/ACT inhaler; Inhale 2 puffs Every 4 (Four) Hours As Needed for Wheezing.  Dispense: 8.5 g; Refill: 2            Follow Up   No follow-ups on file.  Patient was given instructions and counseling regarding her condition or for health maintenance advice. Please see specific information pulled into the AVS if appropriate.

## 2023-07-24 ENCOUNTER — TELEPHONE (OUTPATIENT)
Dept: INTERNAL MEDICINE | Facility: CLINIC | Age: 38
End: 2023-07-24

## 2023-07-24 ENCOUNTER — OFFICE VISIT (OUTPATIENT)
Dept: INTERNAL MEDICINE | Facility: CLINIC | Age: 38
End: 2023-07-24
Payer: COMMERCIAL

## 2023-07-24 VITALS
SYSTOLIC BLOOD PRESSURE: 104 MMHG | BODY MASS INDEX: 47.09 KG/M2 | WEIGHT: 283 LBS | OXYGEN SATURATION: 97 % | DIASTOLIC BLOOD PRESSURE: 61 MMHG | TEMPERATURE: 97.5 F | HEART RATE: 65 BPM

## 2023-07-24 DIAGNOSIS — G89.29 CHRONIC PAIN OF RIGHT KNEE: ICD-10-CM

## 2023-07-24 DIAGNOSIS — M54.6 CHRONIC BILATERAL THORACIC BACK PAIN: Primary | ICD-10-CM

## 2023-07-24 DIAGNOSIS — M54.50 LUMBAR BACK PAIN: ICD-10-CM

## 2023-07-24 DIAGNOSIS — G89.29 CHRONIC BILATERAL THORACIC BACK PAIN: Primary | ICD-10-CM

## 2023-07-24 DIAGNOSIS — M25.561 CHRONIC PAIN OF RIGHT KNEE: ICD-10-CM

## 2023-07-24 PROCEDURE — 99213 OFFICE O/P EST LOW 20 MIN: CPT | Performed by: INTERNAL MEDICINE

## 2023-07-25 ENCOUNTER — HOSPITAL ENCOUNTER (OUTPATIENT)
Dept: GENERAL RADIOLOGY | Facility: HOSPITAL | Age: 38
Discharge: HOME | End: 2023-07-25
Admitting: INTERNAL MEDICINE
Payer: COMMERCIAL

## 2023-07-25 DIAGNOSIS — G89.29 CHRONIC PAIN OF RIGHT KNEE: ICD-10-CM

## 2023-07-25 DIAGNOSIS — M25.561 CHRONIC PAIN OF RIGHT KNEE: ICD-10-CM

## 2023-07-25 PROCEDURE — 73562 X-RAY EXAM OF KNEE 3: CPT

## 2023-07-27 ENCOUNTER — PATIENT MESSAGE (OUTPATIENT)
Dept: INTERNAL MEDICINE | Facility: CLINIC | Age: 38
End: 2023-07-27
Payer: COMMERCIAL

## 2023-07-27 DIAGNOSIS — M25.561 CHRONIC PAIN OF RIGHT KNEE: Primary | ICD-10-CM

## 2023-07-27 DIAGNOSIS — G89.29 CHRONIC PAIN OF RIGHT KNEE: Primary | ICD-10-CM

## 2023-07-28 NOTE — TELEPHONE ENCOUNTER
From: Jessie Romero  To: Hanna Pereira  Sent: 7/27/2023 1:40 PM EDT  Subject: Question regarding XR KNEE 3 VW RIGHT    I don't see how there's nothing wrong in the x-ray. My knee hurts feel like it rubbing bone to bone.. it pops and makes a horrible snapping sound.. making it hurt more.. I don't understand.... ?? Hopefully physical therapy will help..

## 2023-08-08 ENCOUNTER — OFFICE VISIT (OUTPATIENT)
Dept: BEHAVIORAL HEALTH | Facility: CLINIC | Age: 38
End: 2023-08-08
Payer: COMMERCIAL

## 2023-08-08 VITALS
BODY MASS INDEX: 46.73 KG/M2 | OXYGEN SATURATION: 98 % | SYSTOLIC BLOOD PRESSURE: 114 MMHG | WEIGHT: 280.5 LBS | DIASTOLIC BLOOD PRESSURE: 74 MMHG | HEART RATE: 70 BPM | HEIGHT: 65 IN

## 2023-08-08 DIAGNOSIS — F41.1 GENERALIZED ANXIETY DISORDER: ICD-10-CM

## 2023-08-08 DIAGNOSIS — F33.2 SEVERE EPISODE OF RECURRENT MAJOR DEPRESSIVE DISORDER, WITHOUT PSYCHOTIC FEATURES: Primary | ICD-10-CM

## 2023-08-08 DIAGNOSIS — F43.10 POST TRAUMATIC STRESS DISORDER (PTSD): ICD-10-CM

## 2023-08-08 DIAGNOSIS — F51.01 PRIMARY INSOMNIA: ICD-10-CM

## 2023-08-08 RX ORDER — ESCITALOPRAM OXALATE 10 MG/1
10 TABLET ORAL DAILY
Qty: 60 TABLET | Refills: 1 | Status: SHIPPED | OUTPATIENT
Start: 2023-08-08

## 2023-08-08 NOTE — PATIENT INSTRUCTIONS
1.  Please return to clinic at your next scheduled visit.  Please contact the clinic ([phone]) at least 24 hours prior in the event you need to cancel.  2.  Do no harm to yourself or others.    3.  Avoid alcohol and drugs.    4.  Take all medications as prescribed.  Please contact the clinic with any concerns. If you are in need of medication refills, please call the clinic at [phone].    5. Should you want to get in touch with your provider, LILLIAM Stanford, please contact the office ([phone]), and staff will be able to page Kiersten directly.  6. In the event you have personal crisis, contact the following crisis numbers: Suicide Prevention Hotline 1-191.690.4805; [facility] Helpline 9-203-211-ZQHH; [facility] Emergency Room [phone]; text HELLO to 907159; or 716.     SPECIFIC RECOMMENDATIONS:     1.      Medications discussed at this encounter:                     2.      Psychotherapy recommendations: We will continue therapy at future visits.     3.     Return to clinic:  2 months
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
## 2023-08-11 ENCOUNTER — TREATMENT (OUTPATIENT)
Dept: PHYSICAL THERAPY | Facility: CLINIC | Age: 38
End: 2023-08-11
Payer: COMMERCIAL

## 2023-08-11 DIAGNOSIS — G89.29 CHRONIC BILATERAL LOW BACK PAIN WITH BILATERAL SCIATICA: ICD-10-CM

## 2023-08-11 DIAGNOSIS — M54.41 CHRONIC BILATERAL LOW BACK PAIN WITH BILATERAL SCIATICA: ICD-10-CM

## 2023-08-11 DIAGNOSIS — M54.6 CHRONIC BILATERAL THORACIC BACK PAIN: Primary | ICD-10-CM

## 2023-08-11 DIAGNOSIS — G89.29 CHRONIC PAIN OF RIGHT KNEE: ICD-10-CM

## 2023-08-11 DIAGNOSIS — R26.2 DIFFICULTY WALKING: ICD-10-CM

## 2023-08-11 DIAGNOSIS — M25.561 CHRONIC PAIN OF RIGHT KNEE: ICD-10-CM

## 2023-08-11 DIAGNOSIS — G89.29 CHRONIC BILATERAL THORACIC BACK PAIN: Primary | ICD-10-CM

## 2023-08-11 DIAGNOSIS — M54.42 CHRONIC BILATERAL LOW BACK PAIN WITH BILATERAL SCIATICA: ICD-10-CM

## 2023-08-11 PROCEDURE — 97110 THERAPEUTIC EXERCISES: CPT | Performed by: PHYSICAL THERAPIST

## 2023-08-11 PROCEDURE — 97162 PT EVAL MOD COMPLEX 30 MIN: CPT | Performed by: PHYSICAL THERAPIST

## 2023-08-11 NOTE — PROGRESS NOTES
"  Physical Therapy Initial Evaluation and Plan of Care     [address] [place] [id number]    Patient: Jessie Romero   : 1985  Diagnosis/ICD-10 Code:  Chronic bilateral thoracic back pain [M54.6, G89.29]  Referring practitioner: Hanna Rudolph*  Date of Initial Visit: 2023  Today's Date: 2023  Patient seen for 1 sessions           Subjective Questionnaire: Oswestry:  = 20% limitation      Subjective  : Pt presents with scoliosis, she is on her feet a lot more. Pain has been aggravated as she switched jobs. She has been limited with bending/twisting etc. Because of her scoliosis. Right knee pain, reports popping and snapping on the inside of the knee. Very stiff with bending, or squatting. Has seen chiropractor in the past which was helpful, but also hurt during the adjustments. Takes NSAIDs sometimes.    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, depression/anxiety, \"sometimes hyperventilates and passes out\"    Patient Goals: Reduce pain      Objective          Postural Observations    Additional Postural Observation Details  Scoliosis: right thoracic curve, left lumbar curve    Palpation     Additional Palpation Details  Tenderness noted right sided thoracic/lumbar paraspinals, left lumbar paraspinals      Active Range of Motion   Left Knee   Flexion: 110 degrees   Extension: 0 degrees     Right Knee   Flexion: 105 degrees with pain  Extension: 0 degrees     Additional Active Range of Motion Details  Thoracic and lumbar ROM WNL though mild discrepancies (right SB) with sidebending as expected with scoliotic " curve    Strength/Myotome Testing     Left Shoulder     Planes of Motion   Flexion: 4+   Extension: 4+   Abduction: 4   Horizontal abduction: 4-     Right Shoulder     Planes of Motion   Flexion: 4+   Extension: 4+   Abduction: 4   Horizontal abduction: 4-     Left Hip   Planes of Motion   Flexion: 4  Extension: 4  Abduction: 4-  Adduction: 4+    Right Hip   Planes of Motion   Flexion: 4+  Extension: 4  Abduction: 4-  Adduction: 4+    Left Knee   Flexion: 4+  Extension: 5    Right Knee   Flexion: 4+  Extension: 4+    Left Ankle/Foot   Dorsiflexion: 5  Plantar flexion: 5    Right Ankle/Foot   Dorsiflexion: 5  Plantar flexion: 5    See Exercise, Manual, and Modality Logs for complete treatment.     Assessment & Plan     Assessment  Impairments: abnormal muscle firing, abnormal or restricted ROM, activity intolerance, impaired physical strength and pain with function  Functional Limitations: carrying objects, lifting, walking, uncomfortable because of pain, sitting and standing  Assessment details: The patient presents to physical therapy with complaints of low back pain with history of scoliosis and right knee pain. The patient presents with associated lower extremity weakness, lumbar stiffness, and functional deficits (OSWESTRY). The patient would benefit from skilled PT intervention to address the above mentioned functional limitations.     Prognosis: good    Goals  Plan Goals: BACK PROBLEMS:    1. The patient complains of mid back pain.  LTG 1: 12 weeks:  The patient will report a pain rating of 3/10 or better at worst in order to improve  tolerance to activities of daily living and improve sleep quality.  STATUS:  New  STG 1a: 6 weeks:  The patient will report a pain rating of 5/10 or better at its worst.  STATUS:  New  TREATMENT:  Therapeutic exercises, manual therapy, aquatic therapy, home exercise   instruction, and modalities as needed for pain to include:  electrical stimulation, moist heat, ice,    ultrasound, and diathermy.      2. The patient demonstrates weakness of the B hip.  LTG 2: 12 weeks:  The patient will demonstrate 5 /5 strength for B hip flexion, abduction,  and extension in order to improve hip stability.  STATUS:  New  STG 2a: 6 weeks:  The patient will demonstrate 4+ /5 strength for B hip flexion, abduction,  and extension.  STATUS:  New  TREATMENT: Therapeutic exercises, manual therapy, aquatic therapy, home exercise instruction,  and modalities as needed for pain to include:  electrical stimulation, moist heat, ice, ultrasound, and   diathermy.      3. Mobility: Walking/Moving Around Functional Limitation    LTG 3: 12 weeks:  The patient will demonstrate 1-19 % limitation by achieving a score of 1-9 on the YOLIS.  STATUS:  New  STG 3 a: 6 weeks:  The patient will demonstrate 20-39 % limitation by achieving a score of 10-19 on the YOLIS.    STATUS:  New  TREATMENT:  Manual therapy, therapeutic exercise, home exercise instruction, and modalities as needed to include: moist heat, electrical stimulation, and ultrasound.      KNEE PROBLEMS    1. The patient has limited ROM of the right knee.   LTG 1:12 weeks:  The patient will demonstrate 0 to 110 degrees of ROM for the right knee in order to allow patient to ambulate.    STATUS:  New   STG 1b: 6 weeks: The patient will demonstrate independent HEP.    STATUS:  New    TREATMENT: Manual therapy, therapeutic exercise, home exercise instruction, and modalities as needed to include:  moist heat, electrical stimulation, ultrasound, and ice.    2. The patient has limited strength of the right knee.   LTG 2: 12 weeks: The patient will demonstrate 5 /5 strength for knee flexion and extension in order to allow patient improved joint stability    STATUS:  New   STG 2b: 6 weeks: The patient will demonstrate full SLR with 0 degree quad lag.    STATUS:  New    TREATMENT: Manual therapy, therapeutic exercise, home exercise instruction, aquatic therapy, and  modalities as needed to include:  moist heat, electrical stimulation, ultrasound, and ice.                Plan  Therapy options: will be seen for skilled therapy services  Planned modality interventions: TENS, cryotherapy, thermotherapy (hydrocollator packs), traction and dry needling  Other planned modality interventions: aquatic therapy  Planned therapy interventions: manual therapy, stretching, strengthening, therapeutic activities, neuromuscular re-education, home exercise program, joint mobilization, functional ROM exercises, soft tissue mobilization, spinal/joint mobilization, flexibility and gait training  Other planned therapy interventions: aquatic therapy  Frequency: 3x week  Duration in weeks: 12  Treatment plan discussed with: patient      Visit Diagnoses:    ICD-10-CM ICD-9-CM   1. Chronic bilateral thoracic back pain  M54.6 724.1    G89.29 338.29   2. Chronic bilateral low back pain with bilateral sciatica  M54.42 724.2    M54.41 724.3    G89.29 338.29   3. Chronic pain of right knee  M25.561 719.46    G89.29 338.29   4. Difficulty walking  R26.2 719.7       History # of Personal Factors and/or Comorbidities: MODERATE (1-2)  Examination of Body System(s): # of elements: MODERATE (3)  Clinical Presentation: STABLE   Clinical Decision Making: MODERATE      Timed:         Manual Therapy:    0     mins  55446;     Therapeutic Exercise:    20     mins  84519;     Neuromuscular Lisbeth:    0    mins  10202;    Therapeutic Activity:     0     mins  49237;     Gait Trainin     mins  25753;     Ultrasound:     0     mins  81293;    Ionto                               0    mins   40348  Self Care                       0     mins   91378        Un-Timed:  Electrical Stimulation:    0     mins  70234 ( );  Dry Needling     0     mins self-pay  Canalith Repos    0     mins 92202  Traction     0     mins 44767  Low Eval     0     Mins  87689  Mod Eval     40     Mins  53631  High Eval                        0     Mins  17751  Re-Eval                           0    mins  40675    Timed Treatment:   20   mins   Total Treatment:     60   mins    PT SIGNATURE: Ryan Dove PT     Electronically signed 8/11/2023    [place] License: PT - [id number]     Initial Certification  Certification Period: 8/11/2023 thru 11/8/2023  I certify that the therapy services are furnished while this patient is under my care.  The services outlined above are required by this patient, and will be reviewed every 90 days.     PHYSICIAN: Hanna Pereira MD   NPI: [id number]                                        DATE:     Please sign and return via fax to [phone]. Thank you, [facility].

## 2023-08-14 NOTE — PROGRESS NOTES
Chief Complaint  Hypotension (Follow up, pt says she would like to discuss something that is actually bothering her/Back pain, has got worse over the past 2 years /Knee pain, happened 2 weeks ago. Popping when walking )    Subjective       Jessie Romero presents to [facility]    Hypotension    Back Pain  This is a recurrent problem. The current episode started more than 1 month ago. The problem occurs constantly. The problem has been rapidly worsening since onset. The pain is present in the thoracic spine. The quality of the pain is described as aching, burning, cramping, shooting and stabbing. The pain radiates to the left knee and right knee. The pain is at a severity of 9/10. The pain is Worse during the day. The symptoms are aggravated by bending, position, sitting, standing and twisting. Stiffness is present In the morning and all day. Associated symptoms include paresthesias, pelvic pain and tingling.    Presenting with knee and back pain.     Back pain worsening over the last 2 years.     Knee pain started 2 weeks ago, having some popping sensations.         Objective     Vitals:    07/24/23 1422   BP: 104/61   Pulse: 65   Temp: 97.5 øF (36.4 øC)   TempSrc: Temporal   SpO2: 97%   Weight: 128 kg (283 lb)      Wt Readings from Last 3 Encounters:   08/08/23 127 kg (280 lb 8 oz)   07/24/23 128 kg (283 lb)   06/23/23 134 kg (295 lb 3.2 oz)      BP Readings from Last 3 Encounters:   08/08/23 114/74   07/24/23 104/61   06/23/23 151/95        Body mass index is 47.09 kg/mý.    Class 3 Severe Obesity (BMI >=40). Obesity-related health conditions include the following: none. Obesity is unchanged. BMI is is above average; BMI management plan is completed. We discussed portion control and increasing exercise.       Physical Exam  Vitals reviewed.   Constitutional:       Appearance: Normal appearance. She is well-developed.   HENT:      Head: Normocephalic and  atraumatic.      Mouth/Throat:      Pharynx: No oropharyngeal exudate.   Eyes:      Conjunctiva/sclera: Conjunctivae normal.      Pupils: Pupils are equal, round, and reactive to light.   Neck:      Thyroid: No thyromegaly or thyroid tenderness.   Cardiovascular:      Rate and Rhythm: Normal rate and regular rhythm.      Heart sounds: No murmur heard.    No friction rub. No gallop.   Pulmonary:      Effort: Pulmonary effort is normal.      Breath sounds: Normal breath sounds. No wheezing or rhonchi.   Lymphadenopathy:      Cervical: No cervical adenopathy.   Skin:     General: Skin is warm and dry.   Neurological:      Mental Status: She is alert and oriented to person, place, and time.   Psychiatric:         Mood and Affect: Affect normal.        Result Review :   The following data was reviewed by: Hanna Pereira MD on 07/24/2023:        Procedures    Assessment and Plan   Diagnoses and all orders for this visit:    1. Chronic bilateral thoracic back pain (Primary)  -     Ambulatory Referral to Physical Therapy Evaluate and treat    2. Lumbar back pain  -     Ambulatory Referral to Physical Therapy Evaluate and treat    3. Chronic pain of right knee  -     Ambulatory Referral to Physical Therapy Evaluate and treat  -     XR Knee 3 View Right; Future          Follow Up   Return if symptoms worsen or fail to improve.  Patient was given instructions and counseling regarding her condition or for health maintenance advice. Please see specific information pulled into the AVS if appropriate.

## 2023-08-17 ENCOUNTER — TREATMENT (OUTPATIENT)
Dept: PHYSICAL THERAPY | Facility: CLINIC | Age: 38
End: 2023-08-17
Payer: COMMERCIAL

## 2023-08-17 DIAGNOSIS — R26.2 DIFFICULTY WALKING: ICD-10-CM

## 2023-08-17 DIAGNOSIS — M25.561 CHRONIC PAIN OF RIGHT KNEE: ICD-10-CM

## 2023-08-17 DIAGNOSIS — G89.29 CHRONIC BILATERAL THORACIC BACK PAIN: Primary | ICD-10-CM

## 2023-08-17 DIAGNOSIS — G89.29 CHRONIC BILATERAL LOW BACK PAIN WITH BILATERAL SCIATICA: ICD-10-CM

## 2023-08-17 DIAGNOSIS — G89.29 CHRONIC PAIN OF RIGHT KNEE: ICD-10-CM

## 2023-08-17 DIAGNOSIS — M54.6 CHRONIC BILATERAL THORACIC BACK PAIN: Primary | ICD-10-CM

## 2023-08-17 DIAGNOSIS — M54.41 CHRONIC BILATERAL LOW BACK PAIN WITH BILATERAL SCIATICA: ICD-10-CM

## 2023-08-17 DIAGNOSIS — M54.42 CHRONIC BILATERAL LOW BACK PAIN WITH BILATERAL SCIATICA: ICD-10-CM

## 2023-08-17 PROCEDURE — 97110 THERAPEUTIC EXERCISES: CPT | Performed by: PHYSICAL THERAPIST

## 2023-08-17 PROCEDURE — 97530 THERAPEUTIC ACTIVITIES: CPT | Performed by: PHYSICAL THERAPIST

## 2023-08-17 PROCEDURE — 97112 NEUROMUSCULAR REEDUCATION: CPT | Performed by: PHYSICAL THERAPIST

## 2023-08-17 NOTE — PROGRESS NOTES
Physical Therapy Daily Treatment Note  [address] [place] [address]    Patient: Jessie Romero   : 1985  Diagnosis/ICD-10 Code:  Chronic bilateral thoracic back pain [M54.6, G89.29]  Referring practitioner: Hanna Rudolph*  Date of Initial Visit: Type: THERAPY  Noted: 2023  Today's Date: 2023  Patient seen for 2 sessions           Subjective : Patient reports she has been incorporating the exercises with her other exercises and is performing them with her mother. Pain is low overall, but her left shoulder blade is bothering]ng her some.     Objective   See Exercise, Manual, and Modality Logs for complete treatment.       Assessment/Plan : Pt tolerated today's session well. Progressed her exercises to address left mid scapular pain, she was able to get some relief. Cueing for abdominal stabilization and added exercises for improving her neuro muscular control during activities and exercises. Pt would benefit from continued skilled therapy to address deficits. Progress per plan of care.             Timed:  Manual Therapy:    0     mins  61861;  Therapeutic Exercise:    12     mins  67107;     Neuromuscular Lisbeth:    8    mins  45982;    Therapeutic Activity:     12     mins  00341;     Gait Trainin     mins  24031;     Ultrasound:     0     mins  54460;    Aquatic Therapy    0     mins  62057;    Untimed:  Electrical Stimulation:    0     mins  27063 ( );  Dry Needling     0     mins self-pay;  Mechanical Traction    0     mins  75050  Moist Heat     0     mins  No charge  Canalith Repos              0     mins 74695    Timed Treatment:   32   mins   Total Treatment:     32   mins    Ryan Dove PT  Physical Therapist    Electronically signed 2023    [place] License: PT - [id number]

## 2023-08-21 NOTE — TELEPHONE ENCOUNTER
Caller: Jessie Romeroaine    Relationship: Self    Best call back number: [phone]    Requested Prescriptions:   Requested Prescriptions     Pending Prescriptions Disp Refills    Semaglutide-Weight Management (Wegovy) 0.5 MG/0.5ML solution auto-injector 2 mL 0     Sig: Inject 0.5 mL under the skin into the appropriate area as directed 1 (One) Time Per Week.        Pharmacy where request should be sent: [facility]     Last office visit with prescribing clinician: 7/24/2023   Last telemedicine visit with prescribing clinician: Visit date not found   Next office visit with prescribing clinician: Visit date not found     Does the patient have less than a 3 day supply:  [x] Yes  [] No      Yenny Conteh Rep   08/21/23 12:11 EDT

## 2023-08-22 ENCOUNTER — OFFICE VISIT (OUTPATIENT)
Dept: CARDIOLOGY | Facility: CLINIC | Age: 38
End: 2023-08-22
Payer: COMMERCIAL

## 2023-08-22 VITALS
WEIGHT: 277 LBS | BODY MASS INDEX: 46.15 KG/M2 | HEART RATE: 72 BPM | DIASTOLIC BLOOD PRESSURE: 74 MMHG | SYSTOLIC BLOOD PRESSURE: 107 MMHG | HEIGHT: 65 IN

## 2023-08-22 DIAGNOSIS — I95.1 ORTHOSTATIC HYPOTENSION: Primary | ICD-10-CM

## 2023-08-22 DIAGNOSIS — R55 SYNCOPE, UNSPECIFIED SYNCOPE TYPE: ICD-10-CM

## 2023-08-22 PROBLEM — R06.09 DYSPNEA ON EXERTION: Status: RESOLVED | Noted: 2023-05-30 | Resolved: 2023-08-22

## 2023-08-22 NOTE — PROGRESS NOTES
Chief Complaint  Follow-up    Subjective            History of Present Illness  Jessie Romero is a 37-year-old white/ female patient who presents to the office today for follow-up.  She had previously been seen by Dr. Richey on 1/5/2023 and 2/2/2023 for syncope and orthostatic symptoms.  She reports that in April of this year she moved out of her home and now is  from her ex-fianc‚.  Doing all of this daily for a lot of emotional stress and has impacted her in a positive way physically.  She has lost 3 pounds and is no longer having syncopal symptoms or blood pressure issues.  She is undergoing some allergy testing and is on therapy for her anxiety and is being prescribed Lexapro.  She is not being prescribed any cardiac medications.    [facility]  Past Medical History:   Diagnosis Date    Allergic 2000    Moved to [place]    Anxiety     Headache 2002    Heart murmur     Myocardial infarction 03/2010         ALLERGY  Allergies   Allergen Reactions    Morphine Hallucinations    Doxycycline Hives     Shortness of air    Adhesive Tape Rash     CANNOT USE EKG ADHESIVE STRIPS/RASH & ITCHING    Latex Rash          SURGICALHX  Past Surgical History:   Procedure Laterality Date    ADRENAL GLAND SURGERY Bilateral 01/01/2010    [facility] [place] [facility]          SOC  Social History     Socioeconomic History    Marital status: Single   Tobacco Use    Smoking status: Some Days     Packs/day: 2.00     Years: 3.00     Pack years: 6.00     Types: Cigarettes     Last attempt to quit: 2020     Years since quitting: 3.6    Smokeless tobacco: Never   Vaping Use    Vaping Use: Never used   Substance and Sexual Activity    Alcohol use: Not Currently     Alcohol/week: 3.0 standard drinks     Types: 3 Glasses of wine per week     Comment: OCCASIONAL/SOCIAL    Drug use: Never    Sexual activity: Not Currently     Partners: Male, Female     Birth control/protection: None     Comment: CAN'T USE CONDOMS DUE TO LATEX  ALLERGY         FAMHX  Family History   Problem Relation Age of Onset    Hyperlipidemia Mother     Thyroid disease Mother     Alcohol abuse Father         Sober 25 years now    No Known Problems Sister     No Known Problems Brother     Alcohol abuse Maternal Aunt         Dead    No Known Problems Paternal Aunt     No Known Problems Maternal Uncle     No Known Problems Paternal Uncle     No Known Problems Maternal Grandfather     Arthritis Maternal Grandmother     COPD Maternal Grandmother         Smoker    COPD Paternal Grandfather         Smoker    No Known Problems Paternal Grandmother     No Known Problems Cousin     No Known Problems Other     ADD / ADHD Neg Hx     Anxiety disorder Neg Hx     Bipolar disorder Neg Hx     Dementia Neg Hx     Depression Neg Hx     Drug abuse Neg Hx     OCD Neg Hx     Paranoid behavior Neg Hx     Schizophrenia Neg Hx     Seizures Neg Hx     Self-Injurious Behavior  Neg Hx     Suicide Attempts Neg Hx           MEDSIGONLY  Current Outpatient Medications on File Prior to Visit   Medication Sig    albuterol sulfate  (90 Base) MCG/ACT inhaler Inhale 2 puffs Every 4 (Four) Hours As Needed for Wheezing.    Azelastine HCl 137 MCG/SPRAY solution Instill 1-2 sprays each nostril twice a day as needed for runny nose, sneezing or increased nasal allergy symptoms.    busPIRone (BUSPAR) 5 MG tablet Take 1 tablet by mouth 2 (Two) Times a Day As Needed (Anxiety).    cyclobenzaprine (FLEXERIL) 10 MG tablet Take 1 tablet by mouth As Needed for Muscle Spasms.    escitalopram (Lexapro) 10 MG tablet Take 1 tablet by mouth Daily.    fexofenadine (Allegra Allergy) 180 MG tablet Take 1 tablet by mouth Daily.    fluticasone (FLONASE) 50 MCG/ACT nasal spray instill 2 sprays in each nostril once daily    montelukast (SINGULAIR) 10 MG tablet Take one tablet daily at bedtime    Rimegepant Sulfate (NURTEC) 75 MG tablet dispersible tablet Take 1 tablet by mouth As Needed (every other day as needed for  "migraine).    Semaglutide-Weight Management (Wegovy) 1 MG/0.5ML solution auto-injector Inject 0.5 mL under the skin into the appropriate area as directed 1 (One) Time Per Week.    traZODone (DESYREL) 50 MG tablet Take 1-2 tablets by mouth every night at bedtime.    [DISCONTINUED] Semaglutide-Weight Management (Wegovy) 0.5 MG/0.5ML solution auto-injector Inject 0.5 mL under the skin into the appropriate area as directed 1 (One) Time Per Week.     No current facility-administered medications on file prior to visit.       Objective   /74   Pulse 72   Ht 165.1 cm (65\")   Wt 126 kg (277 lb)   BMI 46.10 kg/mý       Physical Exam  Constitutional:       Appearance: She is obese.   HENT:      Head: Normocephalic.   Neck:      Vascular: No carotid bruit.   Cardiovascular:      Rate and Rhythm: Normal rate and regular rhythm.      Pulses: Normal pulses.      Heart sounds: Normal heart sounds. No murmur heard.  Pulmonary:      Effort: Pulmonary effort is normal.      Breath sounds: Normal breath sounds.   Musculoskeletal:      Cervical back: Neck supple.      Right lower leg: No edema.      Left lower leg: No edema.   Skin:     General: Skin is dry.   Neurological:      Mental Status: She is alert and oriented to person, place, and time.   Psychiatric:         Behavior: Behavior normal.     Result Review :   The following data was reviewed by: LILLIAM Rendon on 08/22/2023:  No results found for: PROBNP  CMP          4/24/2023    16:02   CMP   Glucose 77    BUN 19    Creatinine 0.73    EGFR 109.5    Sodium 138    Potassium 4.0    Chloride 102    Calcium 9.8    Total Protein 6.9    Albumin 4.2    Globulin 2.7    Total Bilirubin <0.2    Alkaline Phosphatase 80    AST (SGOT) 12    ALT (SGPT) 15    Albumin/Globulin Ratio 1.6    BUN/Creatinine Ratio 26.0    Anion Gap 7.1      CBC w/diff          4/24/2023    16:02   CBC w/Diff   WBC 10.48    RBC 4.56    Hemoglobin 12.5    Hematocrit 38.3    MCV 84.0    MCH 27.4  "   MCHC 32.6    RDW 12.9    Platelets 323    Neutrophil Rel % 59.2    Immature Granulocyte Rel % 0.5    Lymphocyte Rel % 28.7    Monocyte Rel % 8.1    Eosinophil Rel % 3.0    Basophil Rel % 0.5       Lab Results   Component Value Date    TSH 1.060 04/24/2023      Lab Results   Component Value Date    FREET4 1.02 05/31/2022      No results found for: DDIMERQUANT  Magnesium   Date Value Ref Range Status   08/18/2022 2.0 1.6 - 2.6 mg/dL Final      No results found for: DIGOXIN   Lab Results   Component Value Date    TROPONINT <0.010 08/18/2022           Lipid Panel          4/24/2023    16:02   Lipid Panel   Total Cholesterol 174    Triglycerides 181    HDL Cholesterol 44    VLDL Cholesterol 31    LDL Cholesterol  99    LDL/HDL Ratio 2.13      Results for orders placed during the hospital encounter of 02/21/23    Adult Transthoracic Echo Complete W/ Cont if Necessary Per Protocol    Interpretation Summary    The study is technically difficult for diagnosis.    Left ventricular systolic function is normal. Estimated left ventricular EF = 55%    Left ventricular diastolic function was normal.         Assessment and Plan    Diagnoses and all orders for this visit:    1. Orthostatic hypotension (Primary) & 2. Syncope  Issues are resolved, she can follow up in a year or as needed        Follow Up   Return in about 1 year (around 8/22/2024) for Follow up with Dr Richey.    Patient was given instructions and counseling regarding her condition or for health maintenance advice. Please see specific information pulled into the AVS if appropriate.     Jessie Romero  reports that she has been smoking cigarettes. She has a 6.00 pack-year smoking history. She has never used smokeless tobacco.. I have educated her on the risk of diseases from using tobacco products such as cancer, COPD, and heart disease.     I advised her to quit and she is not willing to quit.    I spent 3  minutes counseling the patient.           Laura TRACY  LILLIAM Cooper  08/22/23  15:07 EDT    Dictated Utilizing Dragon Dictation

## 2023-08-23 ENCOUNTER — TELEPHONE (OUTPATIENT)
Dept: ORTHOPEDICS | Facility: OTHER | Age: 38
End: 2023-08-23
Payer: COMMERCIAL

## 2023-08-25 ENCOUNTER — TELEPHONE (OUTPATIENT)
Dept: INTERNAL MEDICINE | Facility: CLINIC | Age: 38
End: 2023-08-25

## 2023-08-28 ENCOUNTER — TELEPHONE (OUTPATIENT)
Dept: INTERNAL MEDICINE | Facility: CLINIC | Age: 38
End: 2023-08-28
Payer: COMMERCIAL

## 2023-08-28 RX ORDER — SEMAGLUTIDE 1 MG/.5ML
1 INJECTION, SOLUTION SUBCUTANEOUS WEEKLY
Qty: 2 ML | Refills: 0 | Status: SHIPPED | OUTPATIENT
Start: 2023-08-28

## 2023-08-28 NOTE — TELEPHONE ENCOUNTER
Pt is currently taking wegovy and has been out of medication for about 3 weeks. Pt is concerned that she will have to start over. Spoke with pharmacy and they stated she had no more refills available.

## 2023-08-30 ENCOUNTER — TREATMENT (OUTPATIENT)
Dept: PHYSICAL THERAPY | Facility: CLINIC | Age: 38
End: 2023-08-30
Payer: COMMERCIAL

## 2023-08-30 DIAGNOSIS — M54.6 CHRONIC BILATERAL THORACIC BACK PAIN: Primary | ICD-10-CM

## 2023-08-30 DIAGNOSIS — R26.2 DIFFICULTY WALKING: ICD-10-CM

## 2023-08-30 DIAGNOSIS — G89.29 CHRONIC PAIN OF RIGHT KNEE: ICD-10-CM

## 2023-08-30 DIAGNOSIS — M54.41 CHRONIC BILATERAL LOW BACK PAIN WITH BILATERAL SCIATICA: ICD-10-CM

## 2023-08-30 DIAGNOSIS — M25.561 CHRONIC PAIN OF RIGHT KNEE: ICD-10-CM

## 2023-08-30 DIAGNOSIS — M54.42 CHRONIC BILATERAL LOW BACK PAIN WITH BILATERAL SCIATICA: ICD-10-CM

## 2023-08-30 DIAGNOSIS — G89.29 CHRONIC BILATERAL LOW BACK PAIN WITH BILATERAL SCIATICA: ICD-10-CM

## 2023-08-30 DIAGNOSIS — G89.29 CHRONIC BILATERAL THORACIC BACK PAIN: Primary | ICD-10-CM

## 2023-08-30 PROCEDURE — 97530 THERAPEUTIC ACTIVITIES: CPT | Performed by: PHYSICAL THERAPIST

## 2023-08-30 PROCEDURE — 97112 NEUROMUSCULAR REEDUCATION: CPT | Performed by: PHYSICAL THERAPIST

## 2023-08-30 PROCEDURE — 97110 THERAPEUTIC EXERCISES: CPT | Performed by: PHYSICAL THERAPIST

## 2023-08-30 RX ORDER — SEMAGLUTIDE 0.5 MG/.5ML
0.5 INJECTION, SOLUTION SUBCUTANEOUS WEEKLY
Qty: 2 ML | Refills: 0 | OUTPATIENT
Start: 2023-08-30

## 2023-08-30 NOTE — PROGRESS NOTES
Physical Therapy Daily Treatment Note  [address] [place] [id number]    Patient: Jessie Romero   : 1985  Diagnosis/ICD-10 Code:  Chronic bilateral thoracic back pain [M54.6, G89.29]  Referring practitioner: Hanna Rudolph*  Date of Initial Visit: Type: THERAPY  Noted: 2023  Today's Date: 2023  Patient seen for 3 sessions           Subjective : Patient reports she has been continuing her exercises with her mother and feels like its been helpful, though still having some pain in her lower back. Reports she has gotten about 17,000 steps today.    Objective   See Exercise, Manual, and Modality Logs for complete treatment.       Assessment/Plan : Pt tolerated today's session well. Progressed her to standing core stabilization exercises today, tolerated well overall. Pt would benefit from continued skilled therapy to address deficits. Progress per plan of care.             Timed:  Manual Therapy:    0     mins  46152;  Therapeutic Exercise:    8     mins  19649;     Neuromuscular Lisbteh:    10    mins  60548;    Therapeutic Activity:     8     mins  81518;     Gait Trainin     mins  51638;     Ultrasound:     0     mins  40879;    Aquatic Therapy    0     mins  42630;    Untimed:  Electrical Stimulation:    0     mins  12614 ( );  Dry Needling     0     mins self-pay;  Mechanical Traction    0     mins  92554  Moist Heat     0     mins  No charge  Canalith Repos              0     mins 65720    Timed Treatment:   26   mins   Total Treatment:     26   mins    Ryan Dove PT  Physical Therapist    Electronically signed 2023    [place] License: PT - [id number]

## 2023-09-01 ENCOUNTER — HOSPITAL ENCOUNTER (OUTPATIENT)
Dept: MRI IMAGING | Facility: HOSPITAL | Age: 38
Discharge: HOME | End: 2023-09-01
Admitting: INTERNAL MEDICINE
Payer: COMMERCIAL

## 2023-09-01 DIAGNOSIS — M25.561 CHRONIC PAIN OF RIGHT KNEE: ICD-10-CM

## 2023-09-01 DIAGNOSIS — G89.29 CHRONIC PAIN OF RIGHT KNEE: ICD-10-CM

## 2023-09-01 PROCEDURE — 73721 MRI JNT OF LWR EXTRE W/O DYE: CPT

## 2023-09-06 ENCOUNTER — TREATMENT (OUTPATIENT)
Dept: PHYSICAL THERAPY | Facility: CLINIC | Age: 38
End: 2023-09-06
Payer: COMMERCIAL

## 2023-09-06 DIAGNOSIS — R26.2 DIFFICULTY WALKING: ICD-10-CM

## 2023-09-06 DIAGNOSIS — M54.41 CHRONIC BILATERAL LOW BACK PAIN WITH BILATERAL SCIATICA: ICD-10-CM

## 2023-09-06 DIAGNOSIS — M25.561 CHRONIC PAIN OF RIGHT KNEE: ICD-10-CM

## 2023-09-06 DIAGNOSIS — G89.29 CHRONIC PAIN OF RIGHT KNEE: ICD-10-CM

## 2023-09-06 DIAGNOSIS — G89.29 CHRONIC BILATERAL THORACIC BACK PAIN: Primary | ICD-10-CM

## 2023-09-06 DIAGNOSIS — G89.29 CHRONIC BILATERAL LOW BACK PAIN WITH BILATERAL SCIATICA: ICD-10-CM

## 2023-09-06 DIAGNOSIS — M54.6 CHRONIC BILATERAL THORACIC BACK PAIN: Primary | ICD-10-CM

## 2023-09-06 DIAGNOSIS — M54.42 CHRONIC BILATERAL LOW BACK PAIN WITH BILATERAL SCIATICA: ICD-10-CM

## 2023-09-06 PROCEDURE — 97110 THERAPEUTIC EXERCISES: CPT | Performed by: PHYSICAL THERAPIST

## 2023-09-06 PROCEDURE — 97530 THERAPEUTIC ACTIVITIES: CPT | Performed by: PHYSICAL THERAPIST

## 2023-09-06 NOTE — PROGRESS NOTES
Physical Therapy Daily Treatment Note  [address] [place] [address]    Patient: Jessie Romero   : 1985  Diagnosis/ICD-10 Code:  Chronic bilateral thoracic back pain [M54.6, G89.29]  Referring practitioner: Hanna Rudolph*  Date of Initial Visit: Type: THERAPY  Noted: 2023  Today's Date: 2023  Patient seen for 4 sessions           Subjective : Patient reports she was very sore from the addition of the hip strengthening exercises last visit. She had an MRI on her knee and is planning to discuss ongoing treatment with her physician in addition to possible imaging for her back. She has also experienced numbness in her right leg, and sometimes both with standing and work activities.    Objective   See Exercise, Manual, and Modality Logs for complete treatment.       Assessment/Plan : Pt tolerated today's session well, updated and changed HEP as needed and withholding the sidestepping exercise at this time. She is planning to discuss ongoing needs with her physician and could possibly benefit from further medical evaluation from a specialist for her spine vs imaging due to the severity of the numbness and possible nerve related symptoms; also possible Ortho referral for her right knee.            Timed:  Manual Therapy:    0     mins  91537;  Therapeutic Exercise:    24     mins  05619;     Neuromuscular Lisbeth:    0    mins  37239;    Therapeutic Activity:     10     mins  19528;     Gait Trainin     mins  02323;     Ultrasound:     0     mins  63957;    Aquatic Therapy    0     mins  62418;    Untimed:  Electrical Stimulation:    0     mins  89581 (MC );  Dry Needling     0     mins self-pay;  Mechanical Traction    0     mins  87375  Moist Heat     0     mins  No charge  Canalith Repos              0     mins 63265    Timed Treatment:   34   mins   Total Treatment:     34   mins    Ryan Dove PT  Physical Therapist    Electronically signed 2023    [place]  License: PT - [id number]

## 2023-09-06 NOTE — LETTER
Subjective : Patient reports she was very sore from the addition of the hip strengthening exercises last visit. She had an MRI on her knee and is planning to discuss ongoing treatment with her physician in addition to possible imaging for her back. She has also experienced numbness in her right leg, and sometimes both with standing and work activities.    Objective   See Exercise, Manual, and Modality Logs for complete treatment.       Assessment/Plan : Pt tolerated today's session well, updated and changed HEP as needed and withholding the sidestepping exercise at this time. She is planning to discuss ongoing needs with her physician and could possibly benefit from further medical evaluation from a specialist for her spine vs imaging due to the severity of the numbness and possible nerve related symptoms; also possible Ortho referral for her right knee.         Ryan Dove PT  Physical Therapist    Electronically signed 9/6/2023    [place] License: PT - [id number]

## 2023-09-15 DIAGNOSIS — M25.561 CHRONIC PAIN OF RIGHT KNEE: Primary | ICD-10-CM

## 2023-09-15 DIAGNOSIS — G89.29 CHRONIC PAIN OF RIGHT KNEE: Primary | ICD-10-CM

## 2023-09-15 DIAGNOSIS — S89.90XA INJURY OF MEDIAL COLLATERAL LIGAMENT (MCL) OF KNEE: ICD-10-CM

## 2023-09-23 ENCOUNTER — HOSPITAL ENCOUNTER (EMERGENCY)
Facility: HOSPITAL | Age: 38
Discharge: HOME | End: 2023-09-23
Attending: EMERGENCY MEDICINE
Payer: COMMERCIAL

## 2023-09-23 ENCOUNTER — APPOINTMENT (OUTPATIENT)
Dept: CT IMAGING | Facility: HOSPITAL | Age: 38
End: 2023-09-23
Payer: COMMERCIAL

## 2023-09-23 VITALS
BODY MASS INDEX: 46.95 KG/M2 | RESPIRATION RATE: 16 BRPM | SYSTOLIC BLOOD PRESSURE: 134 MMHG | OXYGEN SATURATION: 99 % | HEART RATE: 80 BPM | TEMPERATURE: 98.2 F | HEIGHT: 64 IN | DIASTOLIC BLOOD PRESSURE: 54 MMHG | WEIGHT: 275 LBS

## 2023-09-23 DIAGNOSIS — K52.9 COLITIS: Primary | ICD-10-CM

## 2023-09-23 DIAGNOSIS — R11.0 NAUSEA: ICD-10-CM

## 2023-09-23 DIAGNOSIS — R19.7 DIARRHEA, UNSPECIFIED TYPE: ICD-10-CM

## 2023-09-23 DIAGNOSIS — R10.84 GENERALIZED ABDOMINAL PAIN: ICD-10-CM

## 2023-09-23 LAB
ALBUMIN SERPL-MCNC: 4.7 G/DL (ref 3.5–5.2)
ALBUMIN/GLOB SERPL: 1.8 G/DL
ALP SERPL-CCNC: 89 U/L (ref 39–117)
ALT SERPL W P-5'-P-CCNC: 10 U/L (ref 1–33)
ANION GAP SERPL CALCULATED.3IONS-SCNC: 12.2 MMOL/L (ref 5–15)
AST SERPL-CCNC: 14 U/L (ref 1–32)
BACTERIA UR QL AUTO: ABNORMAL /HPF
BASOPHILS # BLD AUTO: 0.04 10*3/MM3 (ref 0–0.2)
BASOPHILS NFR BLD AUTO: 0.4 % (ref 0–1.5)
BILIRUB SERPL-MCNC: 0.4 MG/DL (ref 0–1.2)
BILIRUB UR QL STRIP: NEGATIVE
BUN SERPL-MCNC: 21 MG/DL (ref 6–20)
BUN/CREAT SERPL: 25.9 (ref 7–25)
CALCIUM SPEC-SCNC: 9.4 MG/DL (ref 8.6–10.5)
CHLORIDE SERPL-SCNC: 103 MMOL/L (ref 98–107)
CLARITY UR: CLEAR
CO2 SERPL-SCNC: 23.8 MMOL/L (ref 22–29)
COLOR UR: YELLOW
CREAT SERPL-MCNC: 0.81 MG/DL (ref 0.57–1)
DEPRECATED RDW RBC AUTO: 45.2 FL (ref 37–54)
EGFRCR SERPLBLD CKD-EPI 2021: 96.6 ML/MIN/1.73
EOSINOPHIL # BLD AUTO: 0.03 10*3/MM3 (ref 0–0.4)
EOSINOPHIL NFR BLD AUTO: 0.3 % (ref 0.3–6.2)
ERYTHROCYTE [DISTWIDTH] IN BLOOD BY AUTOMATED COUNT: 14.7 % (ref 12.3–15.4)
GLOBULIN UR ELPH-MCNC: 2.6 GM/DL
GLUCOSE SERPL-MCNC: 115 MG/DL (ref 65–99)
GLUCOSE UR STRIP-MCNC: NEGATIVE MG/DL
HCG INTACT+B SERPL-ACNC: <0.5 MIU/ML
HCT VFR BLD AUTO: 39.5 % (ref 34–46.6)
HGB BLD-MCNC: 12.6 G/DL (ref 12–15.9)
HGB UR QL STRIP.AUTO: ABNORMAL
HOLD SPECIMEN: NORMAL
HOLD SPECIMEN: NORMAL
HYALINE CASTS UR QL AUTO: ABNORMAL /LPF
IMM GRANULOCYTES # BLD AUTO: 0.04 10*3/MM3 (ref 0–0.05)
IMM GRANULOCYTES NFR BLD AUTO: 0.4 % (ref 0–0.5)
KETONES UR QL STRIP: NEGATIVE
LEUKOCYTE ESTERASE UR QL STRIP.AUTO: NEGATIVE
LIPASE SERPL-CCNC: 22 U/L (ref 13–60)
LYMPHOCYTES # BLD AUTO: 1.12 10*3/MM3 (ref 0.7–3.1)
LYMPHOCYTES NFR BLD AUTO: 10.8 % (ref 19.6–45.3)
MCH RBC QN AUTO: 26.8 PG (ref 26.6–33)
MCHC RBC AUTO-ENTMCNC: 31.9 G/DL (ref 31.5–35.7)
MCV RBC AUTO: 83.9 FL (ref 79–97)
MONOCYTES # BLD AUTO: 0.71 10*3/MM3 (ref 0.1–0.9)
MONOCYTES NFR BLD AUTO: 6.9 % (ref 5–12)
NEUTROPHILS NFR BLD AUTO: 8.42 10*3/MM3 (ref 1.7–7)
NEUTROPHILS NFR BLD AUTO: 81.2 % (ref 42.7–76)
NITRITE UR QL STRIP: NEGATIVE
NRBC BLD AUTO-RTO: 0 /100 WBC (ref 0–0.2)
PH UR STRIP.AUTO: 5.5 [PH] (ref 5–8)
PLATELET # BLD AUTO: 321 10*3/MM3 (ref 140–450)
PMV BLD AUTO: 9.7 FL (ref 6–12)
POTASSIUM SERPL-SCNC: 3.9 MMOL/L (ref 3.5–5.2)
PROT SERPL-MCNC: 7.3 G/DL (ref 6–8.5)
PROT UR QL STRIP: ABNORMAL
RBC # BLD AUTO: 4.71 10*6/MM3 (ref 3.77–5.28)
RBC # UR STRIP: ABNORMAL /HPF
REF LAB TEST METHOD: ABNORMAL
SODIUM SERPL-SCNC: 139 MMOL/L (ref 136–145)
SP GR UR STRIP: >1.03 (ref 1–1.03)
SQUAMOUS #/AREA URNS HPF: ABNORMAL /HPF
UROBILINOGEN UR QL STRIP: ABNORMAL
WBC # UR STRIP: ABNORMAL /HPF
WBC NRBC COR # BLD: 10.36 10*3/MM3 (ref 3.4–10.8)
WHOLE BLOOD HOLD COAG: NORMAL
WHOLE BLOOD HOLD SPECIMEN: NORMAL

## 2023-09-23 PROCEDURE — 83690 ASSAY OF LIPASE: CPT

## 2023-09-23 PROCEDURE — 81001 URINALYSIS AUTO W/SCOPE: CPT | Performed by: EMERGENCY MEDICINE

## 2023-09-23 PROCEDURE — 25010000002 ONDANSETRON PER 1 MG: Performed by: NURSE PRACTITIONER

## 2023-09-23 PROCEDURE — 96374 THER/PROPH/DIAG INJ IV PUSH: CPT

## 2023-09-23 PROCEDURE — 80053 COMPREHEN METABOLIC PANEL: CPT

## 2023-09-23 PROCEDURE — 25510000001 IOPAMIDOL PER 1 ML

## 2023-09-23 PROCEDURE — 84702 CHORIONIC GONADOTROPIN TEST: CPT

## 2023-09-23 PROCEDURE — 74177 CT ABD & PELVIS W/CONTRAST: CPT

## 2023-09-23 PROCEDURE — 99285 EMERGENCY DEPT VISIT HI MDM: CPT

## 2023-09-23 PROCEDURE — 85025 COMPLETE CBC W/AUTO DIFF WBC: CPT

## 2023-09-23 RX ORDER — ONDANSETRON 4 MG/1
4 TABLET, ORALLY DISINTEGRATING ORAL 4 TIMES DAILY PRN
Qty: 15 TABLET | Refills: 0 | Status: SHIPPED | OUTPATIENT
Start: 2023-09-23

## 2023-09-23 RX ORDER — METRONIDAZOLE 500 MG/1
500 TABLET ORAL 2 TIMES DAILY
Qty: 20 TABLET | Refills: 0 | Status: SHIPPED | OUTPATIENT
Start: 2023-09-23 | End: 2023-10-03

## 2023-09-23 RX ORDER — SODIUM CHLORIDE 0.9 % (FLUSH) 0.9 %
10 SYRINGE (ML) INJECTION AS NEEDED
Status: DISCONTINUED | OUTPATIENT
Start: 2023-09-23 | End: 2023-09-23 | Stop reason: HOSPADM

## 2023-09-23 RX ORDER — DICYCLOMINE HYDROCHLORIDE 10 MG/1
40 CAPSULE ORAL ONCE
Status: COMPLETED | OUTPATIENT
Start: 2023-09-23 | End: 2023-09-23

## 2023-09-23 RX ORDER — LEVOFLOXACIN 500 MG/1
500 TABLET, FILM COATED ORAL ONCE
Status: COMPLETED | OUTPATIENT
Start: 2023-09-23 | End: 2023-09-23

## 2023-09-23 RX ORDER — ONDANSETRON 2 MG/ML
4 INJECTION INTRAMUSCULAR; INTRAVENOUS ONCE
Status: COMPLETED | OUTPATIENT
Start: 2023-09-23 | End: 2023-09-23

## 2023-09-23 RX ORDER — DICYCLOMINE HCL 20 MG
20 TABLET ORAL EVERY 6 HOURS PRN
Qty: 20 TABLET | Refills: 0 | Status: SHIPPED | OUTPATIENT
Start: 2023-09-23

## 2023-09-23 RX ORDER — METRONIDAZOLE 500 MG/1
500 TABLET ORAL ONCE
Status: COMPLETED | OUTPATIENT
Start: 2023-09-23 | End: 2023-09-23

## 2023-09-23 RX ORDER — CIPROFLOXACIN 500 MG/1
500 TABLET, FILM COATED ORAL EVERY 12 HOURS
Qty: 20 TABLET | Refills: 0 | Status: SHIPPED | OUTPATIENT
Start: 2023-09-23 | End: 2023-10-03

## 2023-09-23 RX ADMIN — SODIUM CHLORIDE 500 ML: 9 INJECTION, SOLUTION INTRAVENOUS at 19:39

## 2023-09-23 RX ADMIN — LEVOFLOXACIN 500 MG: 500 TABLET, FILM COATED ORAL at 21:41

## 2023-09-23 RX ADMIN — ONDANSETRON 4 MG: 2 INJECTION INTRAMUSCULAR; INTRAVENOUS at 19:52

## 2023-09-23 RX ADMIN — DICYCLOMINE HYDROCHLORIDE 40 MG: 10 CAPSULE ORAL at 19:39

## 2023-09-23 RX ADMIN — IOPAMIDOL 100 ML: 755 INJECTION, SOLUTION INTRAVENOUS at 18:58

## 2023-09-23 RX ADMIN — METRONIDAZOLE 500 MG: 500 TABLET ORAL at 21:41

## 2023-09-23 NOTE — Clinical Note
[facility] EMERGENCY ROOM  [address]  [place] [address]  Phone: [phone]    Jessie Romero was seen and treated in our emergency department on 9/23/2023.  She may return to work on 09/26/2023.         Thank you for choosing [facility].    Mabel Epstein APRN

## 2023-09-23 NOTE — ED PROVIDER NOTES
Time: 6:02 PM EDT  Date of encounter:  9/23/2023  Independent Historian/Clinical History and Information was obtained by:   Patient    History is limited by: N/A    Chief Complaint   Patient presents with    Abdominal Pain         History of Present Illness:  Patient is a 37 y.o. year old female who presents to the emergency department for evaluation of abdominal pain.  Patient states that she is having upper abdominal pain seems to be worse on the right is also radiating towards her back.  Patient states she went to urgent care and they gave her a GI cocktail and made the pain worse.  Patient midst to associated nausea, vomiting, diarrhea.  Patient admits to previous appendectomy. (Provider in triage, Maximo Moy PA-C)    The patient presents to the emergency department states that last night she had somewhat of a sudden onset of generalized abdominal pain.  She states that it radiates everywhere and that she cannot necessarily pinpoint a place of origin.  She states that she has had no fevers but has felt hot and had chills.  She reports nausea but no vomiting.  She states that she has had about 6 episodes of diarrhea today and states that her stools have been black with no blood but states she has had some mucus.  She reports a left adrenal gland surgery in 2005 with her other abdominal surgeries.  She does have tenderness throughout her abdomen with no rebound or guarding.  She denies any urinary symptoms.      History provided by:  Patient   used: No      Patient Care Team  Primary Care Provider: Hanna Pereira MD    Past Medical History:     Allergies   Allergen Reactions    Morphine Hallucinations    Doxycycline Hives     Shortness of air    Adhesive Tape Rash     CANNOT USE EKG ADHESIVE STRIPS/RASH & ITCHING    Latex Rash     Past Medical History:   Diagnosis Date    Allergic 2000    Moved to [place]    Anxiety     Headache 2002    Heart murmur     Myocardial infarction  03/2010     Past Surgical History:   Procedure Laterality Date    ADRENAL GLAND SURGERY Bilateral 01/01/2010    [facility] [address] [facility]     Family History   Problem Relation Age of Onset    Hyperlipidemia Mother     Thyroid disease Mother     Alcohol abuse Father         Sober 25 years now    No Known Problems Sister     No Known Problems Brother     Alcohol abuse Maternal Aunt         Dead    No Known Problems Paternal Aunt     No Known Problems Maternal Uncle     No Known Problems Paternal Uncle     No Known Problems Maternal Grandfather     Arthritis Maternal Grandmother     COPD Maternal Grandmother         Smoker    COPD Paternal Grandfather         Smoker    No Known Problems Paternal Grandmother     No Known Problems Cousin     No Known Problems Other     ADD / ADHD Neg Hx     Anxiety disorder Neg Hx     Bipolar disorder Neg Hx     Dementia Neg Hx     Depression Neg Hx     Drug abuse Neg Hx     OCD Neg Hx     Paranoid behavior Neg Hx     Schizophrenia Neg Hx     Seizures Neg Hx     Self-Injurious Behavior  Neg Hx     Suicide Attempts Neg Hx        Home Medications:  Prior to Admission medications    Medication Sig Start Date End Date Taking? Authorizing Provider   albuterol sulfate  (90 Base) MCG/ACT inhaler Inhale 2 puffs Every 4 (Four) Hours As Needed for Wheezing. 5/30/23   Janice Polo, PABrijeshC   Azelastine HCl 137 MCG/SPRAY solution Instill 1-2 sprays each nostril twice a day as needed for runny nose, sneezing or increased nasal allergy symptoms. 7/12/23      budesonide-formoterol (Symbicort) 160-4.5 MCG/ACT inhaler Inhale 2 puffs every 12 hours. Use with spacer. Rinse mouth after each use 8/23/23      busPIRone (BUSPAR) 5 MG tablet Take 1 tablet by mouth 2 (Two) Times a Day As Needed (Anxiety). 5/9/23   Kiersten Gallardo APRN   cyclobenzaprine (FLEXERIL) 10 MG tablet Take 1 tablet by mouth As Needed for Muscle Spasms. 4/27/23   Hanna Pereira MD   escitalopram (Lexapro) 10 MG  tablet Take 1 tablet by mouth Daily. 8/8/23   Kiersten Gallardo APRN   fexofenadine (Allegra Allergy) 180 MG tablet Take 1 tablet by mouth Daily. 7/14/23   Karen Durand MD   fluticasone (FLONASE) 50 MCG/ACT nasal spray instill 2 sprays in each nostril once daily 5/30/23   Janice Polo, SANDRO   montelukast (SINGULAIR) 10 MG tablet Take one tablet daily at bedtime 7/12/23      omeprazole (priLOSEC) 40 MG capsule Take 1 capsule by mouth Daily for 15 days. 9/23/23 10/8/23  Alex Chang MD   promethazine (PHENERGAN) 25 MG tablet Take 1 tablet by mouth Every 6 (Six) Hours As Needed for Nausea or Vomiting. 9/23/23   Alex Chang MD   Rimegepant Sulfate (NURTEC) 75 MG tablet dispersible tablet Take 1 tablet by mouth As Needed (every other day as needed for migraine). 4/24/23   Mamadou Richardson APRN   Semaglutide-Weight Management (Wegovy) 1 MG/0.5ML solution auto-injector Inject 0.5 mL under the skin into the appropriate area as directed 1 (One) Time Per Week. 8/28/23   Hanna Pereira MD   traZODone (DESYREL) 50 MG tablet Take 1-2 tablets by mouth every night at bedtime. 5/9/23   Kiersten Gallardo APRN   predniSONE (DELTASONE) 20 MG tablet Take one tablet by mouth twice a day for 7 days 8/23/23 9/23/23          Social History:   Social History     Tobacco Use    Smoking status: Former     Packs/day: 2.00     Years: 3.00     Pack years: 6.00     Types: Cigarettes     Quit date: 2020     Years since quitting: 3.7    Smokeless tobacco: Never   Vaping Use    Vaping Use: Never used   Substance Use Topics    Alcohol use: Not Currently     Alcohol/week: 3.0 standard drinks     Types: 3 Glasses of wine per week     Comment: OCCASIONAL/SOCIAL    Drug use: Never         Review of Systems:  Review of Systems   Constitutional:  Positive for chills. Negative for appetite change and fever.   HENT:  Negative for congestion, ear pain and sore throat.    Eyes:  Negative for pain.   Respiratory:  Negative for cough,  "chest tightness and shortness of breath.    Cardiovascular:  Negative for chest pain.   Gastrointestinal:  Positive for abdominal pain, diarrhea and nausea. Negative for vomiting.   Genitourinary:  Negative for dysuria, flank pain, frequency, hematuria and urgency.   Musculoskeletal:  Negative for back pain, joint swelling, neck pain and neck stiffness.   Skin:  Negative for pallor.   Neurological:  Negative for seizures and headaches.   All other systems reviewed and are negative.     Physical Exam:  /54   Pulse 80   Temp 98.2 °F (36.8 °C) (Oral)   Resp 16   Ht 162.6 cm (64\")   Wt 125 kg (275 lb)   LMP 09/18/2023 (Approximate)   SpO2 99%   BMI 47.20 kg/m²         Physical Exam  Vitals and nursing note reviewed.   Constitutional:       General: She is not in acute distress.     Appearance: Normal appearance. She is well-developed. She is not ill-appearing or toxic-appearing.   HENT:      Head: Normocephalic and atraumatic.   Eyes:      General: No scleral icterus.     Pupils: Pupils are equal, round, and reactive to light.   Cardiovascular:      Rate and Rhythm: Normal rate and regular rhythm.      Pulses: Normal pulses.   Pulmonary:      Effort: Pulmonary effort is normal. No respiratory distress.   Abdominal:      General: Abdomen is flat. There is no distension.      Palpations: Abdomen is soft.      Tenderness: generalized  There is no guarding or rebound.   Musculoskeletal:         General: Normal range of motion.      Cervical back: Normal range of motion and neck supple.   Skin:     General: Skin is warm and dry.      Capillary Refill: Capillary refill takes less than 2 seconds.      Findings: No rash.   Neurological:      General: No focal deficit present.      Mental Status: She is alert and oriented to person, place, and time. Mental status is at baseline.   Psychiatric:         Mood and Affect: Mood normal.         Behavior: Behavior normal.          "       Procedures:  Procedures      Medical Decision Making:      Comorbidities that affect care:    Myocardial infarction, anxiety, heart murmur, headaches    External Notes reviewed:    None      The following orders were placed and all results were independently analyzed by me:  Orders Placed This Encounter   Procedures    CT Abdomen Pelvis With Contrast    [place] Draw    Comprehensive Metabolic Panel    Lipase    Urinalysis With Microscopic If Indicated (No Culture) - Urine, Clean Catch    hCG, Quantitative, Pregnancy    CBC Auto Differential    Urinalysis, Microscopic Only - Urine, Clean Catch    Undress & Gown    CBC & Differential    Green Top (Gel)    Lavender Top    Gold Top - SST    Light Blue Top       Medications Given in the Emergency Department:  Medications   iopamidol (ISOVUE-370) 76 % injection 100 mL (100 mL Intravenous Given 9/23/23 1858)   sodium chloride 0.9 % bolus 500 mL (0 mL Intravenous Stopped 9/23/23 2142)   ondansetron (ZOFRAN) injection 4 mg (4 mg Intravenous Given 9/23/23 1952)   dicyclomine (BENTYL) capsule 40 mg (40 mg Oral Given 9/23/23 1939)   levoFLOXacin (LEVAQUIN) tablet 500 mg (500 mg Oral Given 9/23/23 2141)   metroNIDAZOLE (FLAGYL) tablet 500 mg (500 mg Oral Given 9/23/23 2141)        ED Course:    The patient was initially evaluated in the triage area where orders were placed. The patient was later dispositioned by LILLIAM Koo.      The patient was advised to stay for completion of workup which includes but is not limited to communication of labs and radiological results, reassessment and plan. The patient was advised that leaving prior to disposition by a provider could result in critical findings that are not communicated to the patient.     ED Course as of 09/24/23 0649   Sat Sep 23, 2023   1803 PROVIDER IN TRIAGE  Patient was evaluated by me in triage, Maximo Moy PA-C.  Orders were placed and patient is currently awaiting final results and disposition.  [MD]       ED Course User Index  [MD] Maximo Moy PA-C       Labs:    Lab Results (last 24 hours)       Procedure Component Value Units Date/Time    CBC & Differential [[id number]]  (Abnormal) Collected: 09/23/23 1814    Specimen: Blood Updated: 09/23/23 1823    Narrative:      The following orders were created for panel order CBC & Differential.  Procedure                               Abnormality         Status                     ---------                               -----------         ------                     CBC Auto Differential[[id number]]        Abnormal            Final result                 Please view results for these tests on the individual orders.    Comprehensive Metabolic Panel [[id number]]  (Abnormal) Collected: 09/23/23 1814    Specimen: Blood Updated: 09/23/23 1843     Glucose 115 mg/dL      BUN 21 mg/dL      Creatinine 0.81 mg/dL      Sodium 139 mmol/L      Potassium 3.9 mmol/L      Chloride 103 mmol/L      CO2 23.8 mmol/L      Calcium 9.4 mg/dL      Total Protein 7.3 g/dL      Albumin 4.7 g/dL      ALT (SGPT) 10 U/L      AST (SGOT) 14 U/L      Alkaline Phosphatase 89 U/L      Total Bilirubin 0.4 mg/dL      Globulin 2.6 gm/dL      A/G Ratio 1.8 g/dL      BUN/Creatinine Ratio 25.9     Anion Gap 12.2 mmol/L      eGFR 96.6 mL/min/1.73     Narrative:      GFR Normal >60  Chronic Kidney Disease <60  Kidney Failure <15      Lipase [[id number]]  (Normal) Collected: 09/23/23 1814    Specimen: Blood Updated: 09/23/23 1843     Lipase 22 U/L     hCG, Quantitative, Pregnancy [[id number]] Collected: 09/23/23 1814    Specimen: Blood Updated: 09/23/23 1840     HCG Quantitative <0.50 mIU/mL     Narrative:      HCG Ranges by Gestational Age    Females - non-pregnant premenopausal   </= 1mIU/mL HCG  Females - postmenopausal               </= 7mIU/mL HCG    3 Weeks       5.4   -      72 mIU/mL  4 Weeks      10.2   -     708 mIU/mL  5 Weeks       217   -   8,245 mIU/mL  6 Weeks       152   -  32,177 mIU/mL  7  Weeks     4,059   - 153,767 mIU/mL  8 Weeks    31,366   - 149,094 mIU/mL  9 Weeks    59,109   - 135,901 mIU/mL  10 Weeks   44,186   - 170,409 mIU/mL  12 Weeks   27,107   - 201,615 mIU/mL  14 Weeks   24,302   -  93,646 mIU/mL  15 Weeks   12,540   -  69,747 mIU/mL  16 Weeks    8,904   -  55,332 mIU/mL  17 Weeks    8,240   -  51,793 mIU/mL  18 Weeks    9,649   -  55,271 mIU/mL      CBC Auto Differential [[id number]]  (Abnormal) Collected: 09/23/23 1814    Specimen: Blood Updated: 09/23/23 1823     WBC 10.36 10*3/mm3      RBC 4.71 10*6/mm3      Hemoglobin 12.6 g/dL      Hematocrit 39.5 %      MCV 83.9 fL      MCH 26.8 pg      MCHC 31.9 g/dL      RDW 14.7 %      RDW-SD 45.2 fl      MPV 9.7 fL      Platelets 321 10*3/mm3      Neutrophil % 81.2 %      Lymphocyte % 10.8 %      Monocyte % 6.9 %      Eosinophil % 0.3 %      Basophil % 0.4 %      Immature Grans % 0.4 %      Neutrophils, Absolute 8.42 10*3/mm3      Lymphocytes, Absolute 1.12 10*3/mm3      Monocytes, Absolute 0.71 10*3/mm3      Eosinophils, Absolute 0.03 10*3/mm3      Basophils, Absolute 0.04 10*3/mm3      Immature Grans, Absolute 0.04 10*3/mm3      nRBC 0.0 /100 WBC     Urinalysis With Microscopic If Indicated (No Culture) - Urine, Clean Catch [[id number]]  (Abnormal) Collected: 09/23/23 1859    Specimen: Urine, Clean Catch Updated: 09/23/23 1921     Color, UA Yellow     Appearance, UA Clear     pH, UA 5.5     Specific Gravity, UA >1.030     Glucose, UA Negative     Ketones, UA Negative     Bilirubin, UA Negative     Blood, UA Small (1+)     Protein, UA 30 mg/dL (1+)     Leuk Esterase, UA Negative     Nitrite, UA Negative     Urobilinogen, UA 1.0 E.U./dL    Urinalysis, Microscopic Only - Urine, Clean Catch [[id number]]  (Abnormal) Collected: 09/23/23 1859    Specimen: Urine, Clean Catch Updated: 09/23/23 1922     RBC, UA 13-20 /HPF      WBC, UA 0-2 /HPF      Bacteria, UA None Seen /HPF      Squamous Epithelial Cells, UA 0-2 /HPF      Hyaline Casts, UA 3-6 /LPF       Methodology Automated Microscopy             Imaging:    CT Abdomen Pelvis With Contrast    Result Date: 9/23/2023  PROCEDURE: CT ABDOMEN PELVIS W CONTRAST  COMPARISON: 12/21/2020.  INDICATIONS: 37-year-old female w/ h/o mid abdominal pain for 1 week.  TECHNIQUE: After obtaining the patient's consent, 740 CT images were created with non-ionic intravenous contrast material.  No oral contrast agent was administered for the study.  PROTOCOL:   Standard CT imaging protocol performed.    RADIATION:   Total DLP: 1180.7 mGy*cm   Automated exposure control was utilized to minimize radiation dose. CONTRAST: 100 cc Isovue 370 I.V.  FINDINGS: Uncomplicated right-sided acute-to-subacute infectious/inflammatory (probably moderate in degree) colitis (probably long-segment involvement) is seen predominantly involving cecum and ascending colon as well as the proximal transverse colon (and hepatic flexure of colon).  No pneumoperitoneum or pneumatosis.  No portal or mesenteric venous gas.  No mechanical bowel obstruction.  The remainder of the colon is affected to a lesser extent if at all.  Ischemic colitis is thought to be unlikely.  No pericolonic or perirectal fluid collections are seen to suggest abscess.  A benign 2.4 cm right adnexal cyst is seen.  No suspicious uterine mass.  There are probably reactive lymph nodes identified involving the right lower quadrant.  No definite suppurative or necrotic adenopathy is seen.  The patient has undergone appendectomy and suspected left adrenalectomy.  No right adrenal nodule is seen.  No gallstones or acute cholecystitis.  No acute pancreatitis.  There are probably benign bilateral renal cysts.  No enhanced CT evidence of nephrolithiasis or ureterolithiasis.  No acute pyelonephritis.  No hydronephrosis.  There is hepatomegaly.  There is a suspected benign 1.7 cm right hepatic lobe (superior aspect of the posterior segment) hemangioma, as seen on image 31 of series 201, image 113 of  series 202, image 81 of series 203 and adjacent images.  It is probably better seen than on the current study rather than prior CT study from 12/21/2020, probably related differences in the timing of the contrast bolus.  No definite hepatic metastases are suggested.  No splenomegaly.  The lung bases are clear of acute infiltrate.  There may be mild atelectasis in the lung bases.  There is possible mild levoscoliosis of the thoracolumbar spine.  No acute fracture or aggressive osseous lesion is appreciated.  There are scattered sclerotic foci throughout the imaged thoracolumbar spine, which were seen previously and are nonspecific.  They may be degenerative in nature.  They are thought most likely to be benign in origin.  No urinary bladder calculi are appreciated.  There is a tiny fat-containing umbilical hernia.  It does not contain bowel.        Uncomplicated probably at least moderate in degree acute-to-subacute right-sided colitis is seen.  Consider close interval clinical and imaging follow-up of finding to ensure a benign progression.  Otherwise, no significant acute findings are seen.  Please see above comments for further detail.     Please note that portions of this note were completed with a voice recognition program.  GWENDOLYN STORM JR, MD       Electronically Signed and Approved By: GWENDOLYN STORM JR, MD on 9/23/2023 at 21:05                 Differential Diagnosis and Discussion:      Abdominal Pain: Based on the patient's signs and symptoms, I considered abdominal aortic aneurysm, small bowel obstruction, pancreatitis, acute cholecystitis, acute appendecitis, peptic ulcer disease, gastritis, colitis, endocrine disorders, irritable bowel syndrome and other differential diagnosis an etiology of the patient's abdominal pain.    All labs were reviewed and interpreted by me.  CT scan radiology impression was interpreted by me.    MDM  Number of Diagnoses or Management Options  Colitis: new and requires  workup  Diarrhea, unspecified type: new and requires workup  Generalized abdominal pain: new and requires workup  Nausea: new and requires workup     Amount and/or Complexity of Data Reviewed  Clinical lab tests: reviewed    Risk of Complications, Morbidity, and/or Mortality  Presenting problems: low  Diagnostic procedures: low  Management options: low    Patient Progress  Patient progress: stable         Patient Care Considerations:    NARCOTICS: I considered prescribing opiate pain medication as an outpatient, however patient did not require any narcotic pain medications.      Consultants/Shared Management Plan:    None    Social Determinants of Health:    Patient is independent, reliable, and has access to care.       Disposition and Care Coordination:    Discharged: The patient is suitable and stable for discharge with no need for consideration of observation or admission.    I have explained the patient´s condition, diagnoses and treatment plan based on the information available to me at this time. I have answered questions and addressed any concerns. The patient has a good  understanding of the patient´s diagnosis, condition, and treatment plan as can be expected at this point. The vital signs have been stable. The patient´s condition is stable and appropriate for discharge from the emergency department.      The patient will pursue further outpatient evaluation with the primary care physician or other designated or consulting physician as outlined in the discharge instructions. They are agreeable to this plan of care and follow-up instructions have been explained in detail. The patient has received these instructions in written format and have expressed an understanding of the discharge instructions. The patient is aware that any significant change in condition or worsening of symptoms should prompt an immediate return to this or the closest emergency department or call to 911.  I have explained discharge  medications and the need for follow up with the patient/caretakers. This was also printed in the discharge instructions. Patient was discharged with the following medications and follow up:      Medication List        New Prescriptions      ciprofloxacin 500 MG tablet  Commonly known as: CIPRO  Take 1 tablet by mouth Every 12 (Twelve) Hours for 10 days.     dicyclomine 20 MG tablet  Commonly known as: BENTYL  Take 1 tablet by mouth Every 6 (Six) Hours As Needed for Abdominal Cramping.     metroNIDAZOLE 500 MG tablet  Commonly known as: FLAGYL  Take 1 tablet by mouth 2 (Two) Times a Day for 10 days.     ondansetron ODT 4 MG disintegrating tablet  Commonly known as: ZOFRAN-ODT  Place 1 tablet on the tongue 4 (Four) Times a Day As Needed for Nausea or Vomiting.               Where to Get Your Medications        These medications were sent to [facility]/pharmacy #[id number] - [place], [place] - [address] - [phone]  - [phone] FX  [address] [place] [address]      Hours: 24-hours Phone: [phone]   ciprofloxacin 500 MG tablet  dicyclomine 20 MG tablet  metroNIDAZOLE 500 MG tablet  ondansetron ODT 4 MG disintegrating tablet      Hanna Pereira MD  [address] [place] [id number] [phone]    Call   FOR FOLLOW UP       Final diagnoses:   Colitis   Generalized abdominal pain   Diarrhea, unspecified type   Nausea        ED Disposition       ED Disposition   Discharge    Condition   Stable    Comment   --               This medical record created using voice recognition software.             Mabel Epstein, LILLIAM  09/24/23 0649

## 2023-09-24 NOTE — DISCHARGE INSTRUCTIONS
Rest, drink plenty of fluids.  Clear liquid diet for 24 hours and advance as tolerated to bland diet until symptoms improve.  Take your meds as prescribed.  Complete the antibiotics.  You may also take over-the-counter acetaminophen and Motrin with your medications as needed for pain.  Follow-up with Dr. Pereira next week for reevaluation and further treatment as necessary.  Return to the emergency department immediately for any acutely worsening and persistent abdominal pain, any persistent vomiting, any persistent fevers of 101 or greater or any new or worse concerns.

## 2023-10-02 ENCOUNTER — OFFICE VISIT (OUTPATIENT)
Dept: ORTHOPEDIC SURGERY | Facility: CLINIC | Age: 38
End: 2023-10-02
Payer: COMMERCIAL

## 2023-10-02 VITALS
DIASTOLIC BLOOD PRESSURE: 76 MMHG | WEIGHT: 275 LBS | BODY MASS INDEX: 46.95 KG/M2 | HEART RATE: 84 BPM | HEIGHT: 64 IN | OXYGEN SATURATION: 96 % | SYSTOLIC BLOOD PRESSURE: 107 MMHG

## 2023-10-02 DIAGNOSIS — S83.241A TEAR OF MEDIAL MENISCUS OF RIGHT KNEE, UNSPECIFIED TEAR TYPE, UNSPECIFIED WHETHER OLD OR CURRENT TEAR, INITIAL ENCOUNTER: ICD-10-CM

## 2023-10-02 DIAGNOSIS — M25.561 RIGHT KNEE PAIN, UNSPECIFIED CHRONICITY: Primary | ICD-10-CM

## 2023-10-02 RX ORDER — LIDOCAINE HYDROCHLORIDE 10 MG/ML
5 INJECTION, SOLUTION INFILTRATION; PERINEURAL
Status: COMPLETED | OUTPATIENT
Start: 2023-10-02 | End: 2023-10-02

## 2023-10-02 RX ORDER — TRIAMCINOLONE ACETONIDE 40 MG/ML
40 INJECTION, SUSPENSION INTRA-ARTICULAR; INTRAMUSCULAR
Status: COMPLETED | OUTPATIENT
Start: 2023-10-02 | End: 2023-10-02

## 2023-10-02 RX ADMIN — TRIAMCINOLONE ACETONIDE 40 MG: 40 INJECTION, SUSPENSION INTRA-ARTICULAR; INTRAMUSCULAR at 15:21

## 2023-10-02 RX ADMIN — LIDOCAINE HYDROCHLORIDE 5 ML: 10 INJECTION, SOLUTION INFILTRATION; PERINEURAL at 15:21

## 2023-10-02 NOTE — PROGRESS NOTES
"Chief Complaint  Initial Evaluation of the Right Knee     Subjective      Jessie Romero presents to [facility] for initial evaluation of the right knee. She had a fall awhile back.  She states the fall was about a year ago.  She has pain on the medial aspect of the knee.  She has pain on the medial aspect of the knee and pain with flexion.  She notes some days she limps all the time. She states the pain is affecting her daily tasks and ADL's.  She has pain with twisting and turning on the knee.  She avoids squatting and stooping.     Allergies   Allergen Reactions    Morphine Hallucinations    Doxycycline Hives     Shortness of air    Adhesive Tape Rash     CANNOT USE EKG ADHESIVE STRIPS/RASH & ITCHING    Latex Rash        Social History     Socioeconomic History    Marital status: Single   Tobacco Use    Smoking status: Former     Packs/day: 2.00     Years: 3.00     Pack years: 6.00     Types: Cigarettes     Quit date: 2020     Years since quitting: 3.7    Smokeless tobacco: Never   Vaping Use    Vaping Use: Never used   Substance and Sexual Activity    Alcohol use: Not Currently     Alcohol/week: 3.0 standard drinks     Types: 3 Glasses of wine per week     Comment: OCCASIONAL/SOCIAL    Drug use: Never    Sexual activity: Not Currently     Partners: Female, Male     Birth control/protection: None     Comment: 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     Review of Systems     Constitutional: Denies fevers, chills, weight loss  Cardiovascular: Denies chest pain, shortness of breath  Skin: Denies rashes, acute skin changes  Neurologic: Denies headache, loss of consciousness        Vital Signs:   /76   Pulse 84   Ht 162.6 cm (64\")   Wt 125 kg (275 lb)   SpO2 96%   BMI 47.20 " kg/m²          Physical Exam  General: Alert. No acute distress    Ortho Exam        RIGHT KNEE Flexion 120. Extension 0. Stable to varus/valgus stress. Stable to anterior/posterior drawer. Neurovascularly intact. Positive Chaparrita. Negative Lachman. Positive EHL, FHL, HS and TA. Sensation intact to light touch all 5 nerves of the foot. Ambulates with Antalgic gait. Patella is well tracking. Calf supple, non-tender. Positive tenderness to the medial joint line. Negative tenderness to the lateral joint line. Negative Crepitus. Good strength to hamstrings, quadriceps, dorsiflexors, and plantar flexors.  Knee Extensor Mechanism intact        Right knee: R knee  Date/Time: 10/2/2023 3:21 PM  Consent given by: patient  Site marked: site marked  Timeout: Immediately prior to procedure a time out was called to verify the correct patient, procedure, equipment, support staff and site/side marked as required   Supporting Documentation  Indications: pain   Procedure Details  Location: knee - R knee  Needle size: 22 G  Medications administered: 5 mL lidocaine 1 %; 40 mg triamcinolone acetonide 40 MG/ML  Patient tolerance: patient tolerated the procedure well with no immediate complications            Imaging Results (Most Recent)       None             Result Review :     9/1/23     Narrative & Impression   PROCEDURE:  MRI KNEE RIGHT  WO CONTRAST     COMPARISON: [facility], CR, XR KNEE 3 VW RIGHT, 7/25/2023, 14:47.     INDICATIONS:  medial right knee pain                         TECHNIQUE:    A complete multi-planar MRI was performed.       FINDINGS:          The cruciate and collateral ligaments appear intact.  There is mild edema signal along the medial   collateral ligament at the proximal aspect.  No focal defect or significant edema is seen within   the ligament.  Quadriceps tendon appears intact.  There is a small amount of fluid signal anterior   to the proximal patellar tendon which may indicate bursitis.   The patella tendon appears intact.    There is some redundancy on the sagittal images, likely due to positioning.  Popliteus tendon   appears intact.  There is a small knee effusion.  Trace fluid is present in a Baker's cyst.     Probable small ganglion cyst is seen at the posterior root of the medial meniscus.  No evidence of   fracture.  No other suspicious marrow signal abnormality is seen.  There is mild patellar   chondromalacia with suspected fissure at the median ridge of the patella.  Trochlear articular   cartilage appears intact.  There is partial-thickness cartilage loss and fibrillation of the   weight-bearing medial tibiofemoral compartment.  There is mild signal heterogeneity of the lateral   tibiofemoral articular cartilage without definite high-grade defect.     No definite lateral meniscus defect is identified.  There is hyperintense signal at the periphery   of the medial meniscus posterior horn suspicious for a peripheral longitudinal tear/meniscocapsular   injury.  No other definite medial meniscus defect is seen.     IMPRESSION:                 1. Edema along the medial collateral ligament which can indicate a grade 1 MCL injury.  2. Findings of possible peripheral tear/meniscocapsular injury at the periphery of the medial   meniscus posterior horn.  3. Mild tricompartmental chondromalacia.  4. Small amount of fluid anterior to the proximal patellar tendon which may indicate mild bursitis.  5. Small effusion and Baker's cyst.              Assessment and Plan     Diagnoses and all orders for this visit:    1. Right knee pain, unspecified chronicity (Primary)    2. Possible Tear of medial meniscus of right knee, unspecified tear type, unspecified whether old or current tear, initial encounter        Discussed the treatment plan with the patient. I reviewed the MRI results with the patient.     Discussed the risks and benefits of conservative measures.  The patient expressed understanding and  wished to proceed with a right knee steroid injection.  She tolerated the injection well.         Call or return if worsening symptoms.    Follow Up     4 weeks assess if injection was helpful.       Patient was given instructions and counseling regarding her condition or for health maintenance advice. Please see specific information pulled into the AVS if appropriate.     Scribed for Devan Grover MD by Valentina Bradley MA.  10/02/23   14:47 EDT      I have personally performed the services described in this document as scribed by the above individual and it is both accurate and complete. Devan Grover MD 10/02/23

## 2023-10-03 RX ORDER — SEMAGLUTIDE 1 MG/.5ML
1 INJECTION, SOLUTION SUBCUTANEOUS WEEKLY
Qty: 2 ML | Refills: 0 | Status: SHIPPED | OUTPATIENT
Start: 2023-10-03

## 2023-10-03 RX ORDER — BUDESONIDE AND FORMOTEROL FUMARATE DIHYDRATE 160; 4.5 UG/1; UG/1
AEROSOL RESPIRATORY (INHALATION)
Qty: 10.2 G | Refills: 11 | Status: SHIPPED | OUTPATIENT
Start: 2023-10-03

## 2023-10-03 NOTE — TELEPHONE ENCOUNTER
Caller: Jessie Romeroaine    Relationship: Self    Best call back number: [phone]     Requested Prescriptions:   Requested Prescriptions     Pending Prescriptions Disp Refills    Semaglutide-Weight Management (Wegovy) 1 MG/0.5ML solution auto-injector 2 mL 0     Sig: Inject 0.5 mL under the skin into the appropriate area as directed 1 (One) Time Per Week.    budesonide-formoterol (Symbicort) 160-4.5 MCG/ACT inhaler 10.2 g 11     Sig: Inhale 2 puffs every 12 hours. Use with spacer. Rinse mouth after each use        Pharmacy where request should be sent: [facility]     Last office visit with prescribing clinician: 7/24/2023   Last telemedicine visit with prescribing clinician: Visit date not found   Next office visit with prescribing clinician: Visit date not found     Additional details provided by patient: PATIENT NEEDS THE NEXT GRADE OF WEGOVY    Does the patient have less than a 3 day supply:  [x] Yes  [] No    Would you like a call back once the refill request has been completed: [] Yes [x] No    If the office needs to give you a call back, can they leave a voicemail: [] Yes [x] No    Yenny Xavier Rep   10/03/23 08:31 EDT

## 2023-10-10 ENCOUNTER — OFFICE VISIT (OUTPATIENT)
Dept: BEHAVIORAL HEALTH | Facility: CLINIC | Age: 38
End: 2023-10-10
Payer: COMMERCIAL

## 2023-10-10 VITALS
DIASTOLIC BLOOD PRESSURE: 75 MMHG | SYSTOLIC BLOOD PRESSURE: 113 MMHG | WEIGHT: 269 LBS | BODY MASS INDEX: 45.93 KG/M2 | HEIGHT: 64 IN

## 2023-10-10 DIAGNOSIS — F41.1 GENERALIZED ANXIETY DISORDER: ICD-10-CM

## 2023-10-10 DIAGNOSIS — F43.10 POST TRAUMATIC STRESS DISORDER (PTSD): ICD-10-CM

## 2023-10-10 DIAGNOSIS — F51.01 PRIMARY INSOMNIA: ICD-10-CM

## 2023-10-10 DIAGNOSIS — F33.2 SEVERE EPISODE OF RECURRENT MAJOR DEPRESSIVE DISORDER, WITHOUT PSYCHOTIC FEATURES: Primary | ICD-10-CM

## 2023-10-10 PROCEDURE — 90836 PSYTX W PT W E/M 45 MIN: CPT

## 2023-10-10 PROCEDURE — 99214 OFFICE O/P EST MOD 30 MIN: CPT

## 2023-10-10 RX ORDER — ESCITALOPRAM OXALATE 10 MG/1
10 TABLET ORAL DAILY
Qty: 60 TABLET | Refills: 1 | Status: SHIPPED | OUTPATIENT
Start: 2023-10-10

## 2023-10-10 NOTE — PROGRESS NOTES
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 open and honest communication. Patient given education on medication side effects, diagnosis/illness and relapse symptoms. Plan to continue supportive psychotherapy in next appointment to provide symptom relief.      Risk Assessment: Risk of self-harm acutely and chronically is moderate.    Risk factors include anxiety disorder, mood disorder, and recent psychosocial stressors.   Protective factors include no family history, denies access to guns/weapons, no present SI, no history of suicide attempts or self-harm in the past, minimal AODA, healthcare seeking, future orientation, willingness to engage in care.    Risk assessment could be further elevated in the event of treatment noncompliance and/or AODA.  Labs/studies: No labs/studies ordered at this time  Medications:   New Medications Ordered This Visit   Medications    escitalopram (Lexapro) 10 MG tablet     Sig: Take 1 tablet by mouth Daily.     Dispense:  60 tablet     Refill:  1      Medication Education:   LEXAPRO (ESCITALOPRAM)  Risks, benefits, alternatives discussed with patient including GI upset, nausea vomiting diarrhea, theoretical decrease of seizure threshold predisposing the patient to a slightly higher seizure risk, headaches, sexual dysfunction, serotonin  syndrome, bleeding risk.  After discussion of these risks and benefits, the patient voiced understanding and agreed to proceed.  BUSPAR (BUSPIRONE) Risks, benefits, alternatives discussed with patient including nausea, GI upset, mild sedation, falls risk.  After discussion of these risks and benefits, the patient voiced understanding and agreed to proceed.  DESYREL (TRAZODONE) Risks, benefits, side effects discussed with patient including GI upset, sedation, dizziness/falls risk, grogginess the following day, prolongation of the QTc interval.  After discussion of these risks and benefits, the patient voiced understanding and agreed to proceed.        Follow-up: Return in about 2 months (around 12/10/2023) for Next scheduled follow up.         This document has been electronically signed by LILLIAM Stanford  October 16, 2023 08:46 EDT    Please note that portions of this note were completed with a voice recognition program.  Copied text in this note has been reviewed and is accurate as of 10/16/23

## 2023-10-10 NOTE — PATIENT INSTRUCTIONS
1.  Please return to clinic at your next scheduled visit.  Please contact the clinic ([phone]) at least 24 hours prior in the event you need to cancel.  2.  Do no harm to yourself or others.    3.  Avoid alcohol and drugs.    4.  Take all medications as prescribed.  Please contact the clinic with any concerns. If you are in need of medication refills, please call the clinic at [phone].    5. Should you want to get in touch with your provider, LILLIAM Stanford, please contact the office ([phone]), and staff will be able to page Kiersten directly.  6. In the event you have personal crisis, contact the following crisis numbers: Suicide Prevention Hotline 1-647.768.6208; [facility] Helpline 6-454-379-VCMY; [facility] Emergency Room [phone]; text HELLO to 068186; or 044.     SPECIFIC RECOMMENDATIONS:     1.      Medications discussed at this encounter:     New Medications Ordered This Visit   Medications    escitalopram (Lexapro) 10 MG tablet     Sig: Take 1 tablet by mouth Daily.     Dispense:  60 tablet     Refill:  1                       2.      Psychotherapy recommendations: We will continue therapy at future visits.     3.     Return to clinic: Return in about 2 months (around 12/10/2023) for Next scheduled follow up.

## 2023-10-16 NOTE — TELEPHONE ENCOUNTER
I called and confirmed that She has been taking the 1 mg and is needing to step up to the next dosage.

## 2023-11-15 ENCOUNTER — OFFICE VISIT (OUTPATIENT)
Dept: INTERNAL MEDICINE | Facility: CLINIC | Age: 38
End: 2023-11-15
Payer: COMMERCIAL

## 2023-11-15 VITALS
BODY MASS INDEX: 45.24 KG/M2 | SYSTOLIC BLOOD PRESSURE: 118 MMHG | RESPIRATION RATE: 18 BRPM | DIASTOLIC BLOOD PRESSURE: 70 MMHG | HEIGHT: 64 IN | OXYGEN SATURATION: 97 % | TEMPERATURE: 97.8 F | HEART RATE: 81 BPM | WEIGHT: 265 LBS

## 2023-11-15 DIAGNOSIS — K52.9 SECRETORY DIARRHEA: Primary | ICD-10-CM

## 2023-11-15 LAB
ALBUMIN SERPL-MCNC: 4.5 G/DL (ref 3.5–5.2)
ALBUMIN/GLOB SERPL: 1.7 G/DL
ALP SERPL-CCNC: 82 U/L (ref 39–117)
ALT SERPL W P-5'-P-CCNC: 8 U/L (ref 1–33)
ANION GAP SERPL CALCULATED.3IONS-SCNC: 10.3 MMOL/L (ref 5–15)
AST SERPL-CCNC: 15 U/L (ref 1–32)
BASOPHILS # BLD AUTO: 0.05 10*3/MM3 (ref 0–0.2)
BASOPHILS NFR BLD AUTO: 0.5 % (ref 0–1.5)
BILIRUB SERPL-MCNC: <0.2 MG/DL (ref 0–1.2)
BUN SERPL-MCNC: 18 MG/DL (ref 6–20)
BUN/CREAT SERPL: 23.4 (ref 7–25)
CALCIUM SPEC-SCNC: 9.3 MG/DL (ref 8.6–10.5)
CHLORIDE SERPL-SCNC: 105 MMOL/L (ref 98–107)
CO2 SERPL-SCNC: 25.7 MMOL/L (ref 22–29)
CREAT SERPL-MCNC: 0.77 MG/DL (ref 0.57–1)
CRP SERPL-MCNC: 0.79 MG/DL (ref 0–0.5)
DEPRECATED RDW RBC AUTO: 42.3 FL (ref 37–54)
EGFRCR SERPLBLD CKD-EPI 2021: 102 ML/MIN/1.73
EOSINOPHIL # BLD AUTO: 0.29 10*3/MM3 (ref 0–0.4)
EOSINOPHIL NFR BLD AUTO: 3.1 % (ref 0.3–6.2)
ERYTHROCYTE [DISTWIDTH] IN BLOOD BY AUTOMATED COUNT: 13.7 % (ref 12.3–15.4)
ERYTHROCYTE [SEDIMENTATION RATE] IN BLOOD: 14 MM/HR (ref 0–20)
GLOBULIN UR ELPH-MCNC: 2.7 GM/DL
GLUCOSE SERPL-MCNC: 82 MG/DL (ref 65–99)
HCT VFR BLD AUTO: 38.6 % (ref 34–46.6)
HGB BLD-MCNC: 12.7 G/DL (ref 12–15.9)
IMM GRANULOCYTES # BLD AUTO: 0.02 10*3/MM3 (ref 0–0.05)
IMM GRANULOCYTES NFR BLD AUTO: 0.2 % (ref 0–0.5)
LYMPHOCYTES # BLD AUTO: 2.86 10*3/MM3 (ref 0.7–3.1)
LYMPHOCYTES NFR BLD AUTO: 30.9 % (ref 19.6–45.3)
MCH RBC QN AUTO: 27.7 PG (ref 26.6–33)
MCHC RBC AUTO-ENTMCNC: 32.9 G/DL (ref 31.5–35.7)
MCV RBC AUTO: 84.1 FL (ref 79–97)
MONOCYTES # BLD AUTO: 0.7 10*3/MM3 (ref 0.1–0.9)
MONOCYTES NFR BLD AUTO: 7.6 % (ref 5–12)
NEUTROPHILS NFR BLD AUTO: 5.35 10*3/MM3 (ref 1.7–7)
NEUTROPHILS NFR BLD AUTO: 57.7 % (ref 42.7–76)
NRBC BLD AUTO-RTO: 0 /100 WBC (ref 0–0.2)
PLATELET # BLD AUTO: 410 10*3/MM3 (ref 140–450)
PMV BLD AUTO: 10.3 FL (ref 6–12)
POTASSIUM SERPL-SCNC: 3.8 MMOL/L (ref 3.5–5.2)
PROT SERPL-MCNC: 7.2 G/DL (ref 6–8.5)
RBC # BLD AUTO: 4.59 10*6/MM3 (ref 3.77–5.28)
SODIUM SERPL-SCNC: 141 MMOL/L (ref 136–145)
WBC NRBC COR # BLD: 9.27 10*3/MM3 (ref 3.4–10.8)

## 2023-11-15 PROCEDURE — 86140 C-REACTIVE PROTEIN: CPT | Performed by: INTERNAL MEDICINE

## 2023-11-15 PROCEDURE — 85652 RBC SED RATE AUTOMATED: CPT | Performed by: INTERNAL MEDICINE

## 2023-11-15 PROCEDURE — 80053 COMPREHEN METABOLIC PANEL: CPT | Performed by: INTERNAL MEDICINE

## 2023-11-15 PROCEDURE — 99213 OFFICE O/P EST LOW 20 MIN: CPT | Performed by: INTERNAL MEDICINE

## 2023-11-15 PROCEDURE — 85025 COMPLETE CBC W/AUTO DIFF WBC: CPT | Performed by: INTERNAL MEDICINE

## 2023-11-15 RX ORDER — MULTIPLE VITAMINS W/ MINERALS TAB 9MG-400MCG
1 TAB ORAL DAILY
COMMUNITY

## 2023-11-15 NOTE — PROGRESS NOTES
"Chief Complaint  Diarrhea (DIARRHEA FOR 5 DAYS RANDOMLY, BLOATED AND HAD COLITIS LESS THEN A MONTH AGO, ALSO RASH ON LEFT LEG RIGHT ABOVE THE KNEE THAT IS ITCHING. HEADACHE, YESTERDAY VOMITING, NO VOMITING SINCE YESTERDAY, ALSO WANTS TO UP THE WEGOVY )    Subjective        Jessie Romero presents to [facility]  Diarrhea         38 year old female who presents with complaint of diarrhea which has been going on for approximately five days. Symptoms have been improving today. She states that she was having diarrhea >10 times per day and described the diarrhea as water with no blood or mucus in her stool. No fevers, chills, undercooked food. No known sick contacts.     Had colitis diagnosed via CT scan in Sept 2023 at ED. States she periodically has watery diarrhea every few months. No personal hx of autoimmune dz, although mother has Grave's disease.    Objective   Vital Signs:  /70 (BP Location: Left arm, Patient Position: Sitting, Cuff Size: Adult)   Pulse 81   Temp 97.8 °F (36.6 °C) (Temporal)   Resp 18   Ht 162.6 cm (64\")   Wt 120 kg (265 lb)   SpO2 97%   BMI 45.49 kg/m²   Estimated body mass index is 45.49 kg/m² as calculated from the following:    Height as of this encounter: 162.6 cm (64\").    Weight as of this encounter: 120 kg (265 lb).               Physical Exam  Vitals and nursing note reviewed.   Constitutional:       General: She is not in acute distress.     Appearance: Normal appearance. She is obese.   HENT:      Head: Normocephalic and atraumatic.      Right Ear: Tympanic membrane, ear canal and external ear normal.      Left Ear: Tympanic membrane, ear canal and external ear normal.      Nose: Nose normal.      Mouth/Throat:      Mouth: Mucous membranes are moist.      Pharynx: No oropharyngeal exudate.   Eyes:      Pupils: Pupils are equal, round, and reactive to light.   Cardiovascular:      Rate and Rhythm: Normal rate and " regular rhythm.      Pulses: Normal pulses.      Heart sounds: Normal heart sounds. No murmur heard.     No friction rub. No gallop.   Pulmonary:      Effort: Pulmonary effort is normal. No respiratory distress.      Breath sounds: Normal breath sounds. No wheezing or rales.   Abdominal:      General: Abdomen is flat. Bowel sounds are normal. There is no distension.      Palpations: Abdomen is soft.      Tenderness: There is abdominal tenderness (mild tenderness on right side of abdomen with deep palpation).   Musculoskeletal:         General: Normal range of motion.      Cervical back: Normal range of motion.   Lymphadenopathy:      Cervical: No cervical adenopathy.   Skin:     General: Skin is warm.      Capillary Refill: Capillary refill takes less than 2 seconds.   Neurological:      General: No focal deficit present.      Mental Status: She is alert and oriented to person, place, and time. Mental status is at baseline.   Psychiatric:         Mood and Affect: Mood normal.         Behavior: Behavior normal.         Thought Content: Thought content normal.         Judgment: Judgment normal.        Result Review :                   Assessment and Plan   Diagnoses and all orders for this visit:    1. Secretory diarrhea (Primary)  -     Ambulatory Referral to Gastroenterology  -     CBC & Differential  -     Comprehensive Metabolic Panel  -     C-reactive Protein  -     Sedimentation Rate  -     Clostridioides difficile Toxin, PCR - Stool, Per Rectum; Future  -     Fecal Leukocytes - Stool, Per Rectum; Future  -     Stool Culture (Reference Lab) - Stool, Per Rectum; Future  -     Clostridioides difficile Toxin, PCR - Stool, Per Rectum  -     Fecal Leukocytes - Stool, Per Rectum  -     Stool Culture (Reference Lab) - Stool, Per Rectum    Will order labs and stool studies.   Refer to GI for evaluation         Follow Up   Return if symptoms worsen or fail to improve.  Patient was given instructions and counseling  regarding her condition or for health maintenance advice. Please see specific information pulled into the AVS if appropriate.

## 2023-11-18 ENCOUNTER — APPOINTMENT (OUTPATIENT)
Dept: GENERAL RADIOLOGY | Facility: HOSPITAL | Age: 38
End: 2023-11-18
Payer: COMMERCIAL

## 2023-11-18 ENCOUNTER — HOSPITAL ENCOUNTER (EMERGENCY)
Facility: HOSPITAL | Age: 38
Discharge: HOME | End: 2023-11-18
Attending: EMERGENCY MEDICINE
Payer: COMMERCIAL

## 2023-11-18 VITALS
TEMPERATURE: 97.6 F | SYSTOLIC BLOOD PRESSURE: 118 MMHG | DIASTOLIC BLOOD PRESSURE: 65 MMHG | RESPIRATION RATE: 18 BRPM | HEART RATE: 72 BPM | BODY MASS INDEX: 44.26 KG/M2 | HEIGHT: 65 IN | WEIGHT: 265.65 LBS | OXYGEN SATURATION: 99 %

## 2023-11-18 DIAGNOSIS — F41.0 PANIC ATTACK: Primary | ICD-10-CM

## 2023-11-18 LAB
ALBUMIN SERPL-MCNC: 4.3 G/DL (ref 3.5–5.2)
ALBUMIN/GLOB SERPL: 1.7 G/DL
ALP SERPL-CCNC: 83 U/L (ref 39–117)
ALT SERPL W P-5'-P-CCNC: 10 U/L (ref 1–33)
ANION GAP SERPL CALCULATED.3IONS-SCNC: 9 MMOL/L (ref 5–15)
AST SERPL-CCNC: 13 U/L (ref 1–32)
BASOPHILS # BLD AUTO: 0.01 10*3/MM3 (ref 0–0.2)
BASOPHILS NFR BLD AUTO: 0.1 % (ref 0–1.5)
BILIRUB SERPL-MCNC: 0.2 MG/DL (ref 0–1.2)
BUN SERPL-MCNC: 13 MG/DL (ref 6–20)
BUN/CREAT SERPL: 18.1 (ref 7–25)
CALCIUM SPEC-SCNC: 8.9 MG/DL (ref 8.6–10.5)
CHLORIDE SERPL-SCNC: 102 MMOL/L (ref 98–107)
CO2 SERPL-SCNC: 26 MMOL/L (ref 22–29)
CREAT SERPL-MCNC: 0.72 MG/DL (ref 0.57–1)
DEPRECATED RDW RBC AUTO: 45.5 FL (ref 37–54)
EGFRCR SERPLBLD CKD-EPI 2021: 110.6 ML/MIN/1.73
EOSINOPHIL # BLD AUTO: 0.16 10*3/MM3 (ref 0–0.4)
EOSINOPHIL NFR BLD AUTO: 1.7 % (ref 0.3–6.2)
ERYTHROCYTE [DISTWIDTH] IN BLOOD BY AUTOMATED COUNT: 14.6 % (ref 12.3–15.4)
GLOBULIN UR ELPH-MCNC: 2.6 GM/DL
GLUCOSE SERPL-MCNC: 137 MG/DL (ref 65–99)
HCT VFR BLD AUTO: 38 % (ref 34–46.6)
HGB BLD-MCNC: 11.8 G/DL (ref 12–15.9)
HOLD SPECIMEN: NORMAL
HOLD SPECIMEN: NORMAL
IMM GRANULOCYTES # BLD AUTO: 0.02 10*3/MM3 (ref 0–0.05)
IMM GRANULOCYTES NFR BLD AUTO: 0.2 % (ref 0–0.5)
LIPASE SERPL-CCNC: 38 U/L (ref 13–60)
LYMPHOCYTES # BLD AUTO: 2.66 10*3/MM3 (ref 0.7–3.1)
LYMPHOCYTES NFR BLD AUTO: 29 % (ref 19.6–45.3)
MAGNESIUM SERPL-MCNC: 2 MG/DL (ref 1.6–2.6)
MCH RBC QN AUTO: 26.4 PG (ref 26.6–33)
MCHC RBC AUTO-ENTMCNC: 31.1 G/DL (ref 31.5–35.7)
MCV RBC AUTO: 85 FL (ref 79–97)
MONOCYTES # BLD AUTO: 0.53 10*3/MM3 (ref 0.1–0.9)
MONOCYTES NFR BLD AUTO: 5.8 % (ref 5–12)
NEUTROPHILS NFR BLD AUTO: 5.8 10*3/MM3 (ref 1.7–7)
NEUTROPHILS NFR BLD AUTO: 63.2 % (ref 42.7–76)
NRBC BLD AUTO-RTO: 0 /100 WBC (ref 0–0.2)
NT-PROBNP SERPL-MCNC: 51.3 PG/ML (ref 0–450)
PLATELET # BLD AUTO: 327 10*3/MM3 (ref 140–450)
PMV BLD AUTO: 9.5 FL (ref 6–12)
POTASSIUM SERPL-SCNC: 3.5 MMOL/L (ref 3.5–5.2)
PROT SERPL-MCNC: 6.9 G/DL (ref 6–8.5)
QT INTERVAL: 433 MS
QTC INTERVAL: 471 MS
RBC # BLD AUTO: 4.47 10*6/MM3 (ref 3.77–5.28)
SODIUM SERPL-SCNC: 137 MMOL/L (ref 136–145)
TROPONIN T SERPL HS-MCNC: <6 NG/L
TSH SERPL DL<=0.05 MIU/L-ACNC: 2.2 UIU/ML (ref 0.27–4.2)
WBC NRBC COR # BLD AUTO: 9.18 10*3/MM3 (ref 3.4–10.8)
WHOLE BLOOD HOLD COAG: NORMAL
WHOLE BLOOD HOLD SPECIMEN: NORMAL

## 2023-11-18 PROCEDURE — 93005 ELECTROCARDIOGRAM TRACING: CPT

## 2023-11-18 PROCEDURE — 36415 COLL VENOUS BLD VENIPUNCTURE: CPT

## 2023-11-18 PROCEDURE — 99284 EMERGENCY DEPT VISIT MOD MDM: CPT

## 2023-11-18 PROCEDURE — 71045 X-RAY EXAM CHEST 1 VIEW: CPT

## 2023-11-18 PROCEDURE — 83690 ASSAY OF LIPASE: CPT

## 2023-11-18 PROCEDURE — 84484 ASSAY OF TROPONIN QUANT: CPT

## 2023-11-18 PROCEDURE — 80050 GENERAL HEALTH PANEL: CPT

## 2023-11-18 PROCEDURE — 83735 ASSAY OF MAGNESIUM: CPT

## 2023-11-18 PROCEDURE — 83880 ASSAY OF NATRIURETIC PEPTIDE: CPT

## 2023-11-18 NOTE — ED PROVIDER NOTES
Time: 6:09 PM EST  Date of encounter:  11/18/2023  Independent Historian/Clinical History and Information was obtained by:   Patient    History is limited by: N/A    Chief Complaint   Patient presents with    Abdominal Pain    Shortness of Breath         History of Present Illness:  Patient is a 38 y.o. year old female who presents to the emergency department for evaluation of dyspnea.  Patient states while she was shopping at the dollar store today around 5:00 she had an onset of where she was having abdominal cramps and then suddenly began developing shortness of breath.  Patient states it felt like she was breathing through a straw and still mildly feels this way.  Patient states she has history of asthma and used her inhaler.  Patient states she is not sure if it was caused by panic attack.  (Provider in triage, Maximo Moy PA-C)  Patient also reports that she took 4 puffs from her inhaler earlier today and her symptoms subsided upon arrival to the emergency department.  Patient states he was prescribed Lexapro and BuSpar for anxiety, states she has not been taking the BuSpar.  Patient's family member reports the patient had bright red flushing to her face when this incident occurred.  Patient reports having similar episodes in the past, reports usually about every 3 months and has been told in the past that it was a panic attack.    Patient Care Team  Primary Care Provider: Hanna Pereira MD    Past Medical History:     Allergies   Allergen Reactions    Morphine Hallucinations    Doxycycline Hives     Shortness of air    Adhesive Tape Rash     CANNOT USE EKG ADHESIVE STRIPS/RASH & ITCHING    Latex Rash     Past Medical History:   Diagnosis Date    Allergic 2000    Moved to [place]    Anxiety     Headache 2002    Heart murmur     Myocardial infarction 03/2010    Tear of meniscus of knee      Past Surgical History:   Procedure Laterality Date    ADRENAL GLAND SURGERY Bilateral 01/01/2010    [facility] [address] [facility]     Family History   Problem Relation Age of Onset    Hyperlipidemia Mother     Thyroid disease Mother     Scoliosis Mother     Alcohol abuse Father         Sober 25 years now    No Known Problems Sister     No Known Problems Brother     Alcohol abuse Maternal Aunt         Dead    No Known Problems Paternal Aunt     No Known Problems Maternal Uncle     No Known Problems Paternal Uncle     No Known Problems Maternal Grandfather     Arthritis Maternal Grandmother     COPD Maternal Grandmother         Smoker    COPD Paternal Grandfather         Smoker    No Known Problems Paternal Grandmother     No Known Problems Cousin     No Known Problems Other     ADD / ADHD Neg Hx     Anxiety disorder Neg Hx     Bipolar disorder Neg Hx     Dementia Neg Hx     Depression Neg Hx     Drug abuse Neg Hx     OCD Neg Hx     Paranoid behavior Neg Hx     Schizophrenia Neg Hx     Seizures Neg Hx     Self-Injurious Behavior  Neg Hx     Suicide Attempts Neg Hx        Home Medications:  Prior to Admission medications    Medication Sig Start Date End Date Taking? Authorizing Provider   albuterol sulfate  (90 Base) MCG/ACT inhaler Inhale 2 puffs Every 4 (Four) Hours As Needed for Wheezing. 5/30/23   Janice Polo, SANDRO   Azelastine HCl 137 MCG/SPRAY solution Instill 1-2 sprays each nostril twice a day as needed for runny nose, sneezing or increased nasal allergy symptoms. 7/12/23      budesonide-formoterol (Symbicort) 160-4.5 MCG/ACT inhaler Inhale 2 puffs every 12 hours. Use with spacer. Rinse mouth after each use 10/3/23   Hanna Pereira MD   busPIRone (BUSPAR) 5 MG tablet Take 1 tablet by mouth 2 (Two) Times a Day As Needed (Anxiety). 5/9/23   Kiersten Gallardo APRN   cyclobenzaprine (FLEXERIL) 10 MG tablet Take 1 tablet by mouth As Needed for Muscle Spasms.  Patient not taking: Reported on 10/10/2023 4/27/23   Hanna Pereira MD   dicyclomine (BENTYL) 20 MG tablet Take 1 tablet by mouth  Every 6 (Six) Hours As Needed for Abdominal Cramping.  Patient not taking: Reported on 11/15/2023 9/23/23   Mabel Epstein APRN   escitalopram (Lexapro) 10 MG tablet Take 1 tablet by mouth Daily. 10/10/23   Kiersten Gallardo APRN   fexofenadine (Allegra Allergy) 180 MG tablet Take 1 tablet by mouth Daily. 7/14/23   Karen Durand MD   fluticasone (FLONASE) 50 MCG/ACT nasal spray instill 2 sprays in each nostril once daily 5/30/23   Janice Polo, SANDRO   montelukast (SINGULAIR) 10 MG tablet Take one tablet daily at bedtime 7/12/23      multivitamin with minerals tablet tablet Take 1 tablet by mouth Daily.    Provider, MD Scotty   ondansetron ODT (ZOFRAN-ODT) 4 MG disintegrating tablet Place 1 tablet on the tongue 4 (Four) Times a Day As Needed for Nausea or Vomiting.  Patient not taking: Reported on 11/15/2023 9/23/23   Mabel Epstein APRN   promethazine (PHENERGAN) 25 MG tablet Take 1 tablet by mouth Every 6 (Six) Hours As Needed for Nausea or Vomiting.  Patient not taking: Reported on 11/15/2023 9/23/23   Alex Chang MD   Rimegepant Sulfate (NURTEC) 75 MG tablet dispersible tablet Take 1 tablet by mouth As Needed (every other day as needed for migraine).  Patient not taking: Reported on 11/15/2023 4/24/23   Mamadou Richardson APRN   Semaglutide-Weight Management 1.7 MG/0.75ML solution auto-injector Inject 0.75 mL under the skin into the appropriate area as directed 1 (One) Time Per Week. 10/19/23   Hanna Pereira MD   traZODone (DESYREL) 50 MG tablet Take 1-2 tablets by mouth every night at bedtime. 5/9/23   Kiersten Gallardo APRN        Social History:   Social History     Tobacco Use    Smoking status: Former     Packs/day: 2.00     Years: 3.00     Additional pack years: 0.00     Total pack years: 6.00     Types: Cigarettes     Quit date: 1/1/2020     Years since quitting: 3.8    Smokeless tobacco: Never   Vaping Use    Vaping Use: Never used   Substance Use Topics    Alcohol use: Not  "Currently     Alcohol/week: 3.0 standard drinks of alcohol     Types: 3 Glasses of wine per week     Comment: OCCASIONAL/SOCIAL    Drug use: Never         Review of Systems:  Review of Systems   Constitutional:  Negative for fever.   HENT:  Negative for sore throat.    Eyes: Negative.    Respiratory:  Positive for shortness of breath. Negative for cough.    Cardiovascular:  Negative for chest pain.   Gastrointestinal:  Positive for abdominal pain. Negative for diarrhea and vomiting.   Genitourinary:  Negative for dysuria.   Musculoskeletal:  Negative for neck pain.   Skin:  Negative for rash.   Allergic/Immunologic: Negative.    Neurological:  Negative for weakness, numbness and headaches.   Hematological: Negative.    Psychiatric/Behavioral: Negative.     All other systems reviewed and are negative.       Physical Exam:  /63 (BP Location: Left arm, Patient Position: Sitting)   Pulse 73   Temp 97.6 °F (36.4 °C) (Oral)   Resp 19   Ht 165.1 cm (65\")   Wt 121 kg (265 lb 10.5 oz)   SpO2 99%   BMI 44.21 kg/m²         Physical Exam  Vitals and nursing note reviewed.   Constitutional:       General: She is not in acute distress.     Appearance: Normal appearance. She is obese. She is not toxic-appearing.   HENT:      Head: Normocephalic and atraumatic.      Jaw: There is normal jaw occlusion.      Mouth/Throat:      Mouth: Mucous membranes are moist.   Eyes:      General: Lids are normal.      Extraocular Movements: Extraocular movements intact.      Conjunctiva/sclera: Conjunctivae normal.      Pupils: Pupils are equal, round, and reactive to light.   Cardiovascular:      Rate and Rhythm: Normal rate and regular rhythm.      Pulses: Normal pulses.      Heart sounds: Normal heart sounds.   Pulmonary:      Effort: Pulmonary effort is normal. No respiratory distress.      Breath sounds: Normal breath sounds. No wheezing or rhonchi.   Abdominal:      General: Abdomen is flat. There is no distension.      " Palpations: Abdomen is soft.      Tenderness: There is no abdominal tenderness. There is no guarding or rebound.   Musculoskeletal:         General: Normal range of motion.      Cervical back: Normal range of motion and neck supple.      Right lower leg: No edema.      Left lower leg: No edema.   Skin:     General: Skin is warm and dry.   Neurological:      General: No focal deficit present.      Mental Status: She is alert and oriented to person, place, and time. Mental status is at baseline.   Psychiatric:         Mood and Affect: Mood normal.         Behavior: Behavior normal.            Procedures:  Procedures      Medical Decision Making:      Comorbidities that affect care:    Anxiety, Coronary Artery Disease, Obesity    External Notes reviewed:    Previous Clinic Note: Internal medicine office visit from 11/15/2023 for diarrhea      The following orders were placed and all results were independently analyzed by me:  Orders Placed This Encounter   Procedures    XR Chest 1 View    Comprehensive Metabolic Panel    [place] Draw    Single High Sensitivity Troponin T    BNP    Lipase    CBC Auto Differential    Magnesium    TSH Rfx On Abnormal To Free T4    ECG 12 Lead Chest Pain    CBC & Differential    Green Top (Gel)    Lavender Top    Gold Top - SST    Light Blue Top       Medications Given in the Emergency Department:  Medications - No data to display     ED Course:    The patient was initially evaluated in the triage area where orders were placed. The patient was later dispositioned by LILLIAM Mathis.      The patient was advised to stay for completion of workup which includes but is not limited to communication of labs and radiological results, reassessment and plan. The patient was advised that leaving prior to disposition by a provider could result in critical findings that are not communicated to the patient.     ED Course as of 11/18/23 2214   Sat Nov 18, 2023   1810 PROVIDER IN TRIAGE  Patient was  evaluated by me in triage, Maximo Moy PA-C.  Orders were placed and patient is currently awaiting final results and disposition.  [MD]      ED Course User Index  [MD] Maximo Moy PA-C       Labs:    Lab Results (last 24 hours)       Procedure Component Value Units Date/Time    CBC & Differential [[id number]]  (Abnormal) Collected: 11/18/23 1823    Specimen: Blood from Arm, Left Updated: 11/18/23 1830    Narrative:      The following orders were created for panel order CBC & Differential.  Procedure                               Abnormality         Status                     ---------                               -----------         ------                     CBC Auto Differential[[id number]]        Abnormal            Final result                 Please view results for these tests on the individual orders.    Comprehensive Metabolic Panel [[id number]]  (Abnormal) Collected: 11/18/23 1823    Specimen: Blood from Arm, Left Updated: 11/18/23 1851     Glucose 137 mg/dL      BUN 13 mg/dL      Creatinine 0.72 mg/dL      Sodium 137 mmol/L      Potassium 3.5 mmol/L      Chloride 102 mmol/L      CO2 26.0 mmol/L      Calcium 8.9 mg/dL      Total Protein 6.9 g/dL      Albumin 4.3 g/dL      ALT (SGPT) 10 U/L      AST (SGOT) 13 U/L      Alkaline Phosphatase 83 U/L      Total Bilirubin 0.2 mg/dL      Globulin 2.6 gm/dL      A/G Ratio 1.7 g/dL      BUN/Creatinine Ratio 18.1     Anion Gap 9.0 mmol/L      eGFR 110.6 mL/min/1.73     Narrative:      GFR Normal >60  Chronic Kidney Disease <60  Kidney Failure <15      Single High Sensitivity Troponin T [[id number]]  (Normal) Collected: 11/18/23 1823    Specimen: Blood from Arm, Left Updated: 11/18/23 1850     HS Troponin T <6 ng/L     Narrative:      High Sensitive Troponin T Reference Range:  <14.0 ng/L- Negative Female for AMI  <22.0 ng/L- Negative Male for AMI  >=14 - Abnormal Female indicating possible myocardial injury.  >=22 - Abnormal Male indicating possible myocardial  injury.   Clinicians would have to utilize clinical acumen, EKG, Troponin, and serial changes to determine if it is an Acute Myocardial Infarction or myocardial injury due to an underlying chronic condition.         BNP [[id number]]  (Normal) Collected: 11/18/23 1823    Specimen: Blood from Arm, Left Updated: 11/18/23 1848     proBNP 51.3 pg/mL     Narrative:      This assay is used as an aid in the diagnosis of individuals suspected of having heart failure. It can be used as an aid in the diagnosis of acute decompensated heart failure (ADHF) in patients presenting with signs and symptoms of ADHF to the emergency department (ED). In addition, NT-proBNP of <300 pg/mL indicates ADHF is not likely.    Age Range Result Interpretation  NT-proBNP Concentration (pg/mL:      <50             Positive            >450                   Gray                 300-450                    Negative             <300    50-75           Positive            >900                  Gray                300-900                  Negative            <300      >75             Positive            >1800                  Gray                300-1800                  Negative            <300    Lipase [[id number]]  (Normal) Collected: 11/18/23 1823    Specimen: Blood from Arm, Left Updated: 11/18/23 1850     Lipase 38 U/L     CBC Auto Differential [[id number]]  (Abnormal) Collected: 11/18/23 1823    Specimen: Blood from Arm, Left Updated: 11/18/23 1830     WBC 9.18 10*3/mm3      RBC 4.47 10*6/mm3      Hemoglobin 11.8 g/dL      Hematocrit 38.0 %      MCV 85.0 fL      MCH 26.4 pg      MCHC 31.1 g/dL      RDW 14.6 %      RDW-SD 45.5 fl      MPV 9.5 fL      Platelets 327 10*3/mm3      Neutrophil % 63.2 %      Lymphocyte % 29.0 %      Monocyte % 5.8 %      Eosinophil % 1.7 %      Basophil % 0.1 %      Immature Grans % 0.2 %      Neutrophils, Absolute 5.80 10*3/mm3      Lymphocytes, Absolute 2.66 10*3/mm3      Monocytes, Absolute 0.53 10*3/mm3       Eosinophils, Absolute 0.16 10*3/mm3      Basophils, Absolute 0.01 10*3/mm3      Immature Grans, Absolute 0.02 10*3/mm3      nRBC 0.0 /100 WBC     Magnesium [[id number]]  (Normal) Collected: 11/18/23 1823    Specimen: Blood from Arm, Left Updated: 11/18/23 2156     Magnesium 2.0 mg/dL     TSH Rfx On Abnormal To Free T4 [[id number]]  (Normal) Collected: 11/18/23 1823    Specimen: Blood from Arm, Left Updated: 11/18/23 2209     TSH 2.200 uIU/mL              Imaging:    XR Chest 1 View    Result Date: 11/18/2023  PROCEDURE: XR CHEST 1 VW  COMPARISON: [facility], CR, XR CHEST PA AND LATERAL, 8/16/2021, 12:30.  [facility], CT, CT ABDOMEN PELVIS W CONTRAST, 9/23/2023, 18:59.  [facility], CR, XR CHEST PA AND LATERAL, 5/30/2023, 15:50.  INDICATIONS: CHEST TIGHTNESS, DIZZINESS  FINDINGS:  Stable linear left basilar opacity, likely due to scarring.  Lungs are adequately expanded and otherwise appear clear.  No pneumothorax or large pleural effusion is seen.  Cardiomediastinal contours appear within normal limits.  There is reverse S-shaped curvature of the thoracic spine.        No acute cardiopulmonary abnormality is identified.       DORA MARTINO MD       Electronically Signed and Approved By: DORA MARTINO MD on 11/18/2023 at 19:26                Differential Diagnosis and Discussion:      Dyspnea: Differential diagnosis includes but is not limited to metabolic acidosis, neurological disorders, psychogenic, asthma, pneumothorax, upper airway obstruction, COPD, pneumonia, noncardiogenic pulmonary edema, interstitial lung disease, anemia, congestive heart failure, and pulmonary embolism    All labs were reviewed and interpreted by me.  All X-rays impressions were independently interpreted by me.  EKG was interpreted by supervising attending.    MDM     Amount and/or Complexity of Data Reviewed  Clinical lab tests: reviewed  Tests in the radiology section  of CPT®: reviewed  Tests in the medicine section of CPT®: reviewed    Patient's symptoms have resolved prior to provider assessment.  Chest x-ray shows no acute cardiopulmonary abnormality.  No wheezing heard upon auscultation of lung fields.  Instructed patient to follow-up with her primary care provider and to return to the emergency department with worsening symptoms including shortness of breath, palpitations, chest pain.            Patient Care Considerations:    CT CHEST: I considered ordering a CT scan of the chest, however patient denies chest pain, shortness of breath has resolved, chest xray is adequate for exam.      Consultants/Shared Management Plan:    None    Social Determinants of Health:    Patient is independent, reliable, and has access to care.       Disposition and Care Coordination:    Discharged: The patient is suitable and stable for discharge with no need for consideration of observation or admission.    I have explained the patient´s condition, diagnoses and treatment plan based on the information available to me at this time. I have answered questions and addressed any concerns. The patient has a good  understanding of the patient´s diagnosis, condition, and treatment plan as can be expected at this point. The vital signs have been stable. The patient´s condition is stable and appropriate for discharge from the emergency department.      The patient will pursue further outpatient evaluation with the primary care physician or other designated or consulting physician as outlined in the discharge instructions. They are agreeable to this plan of care and follow-up instructions have been explained in detail. The patient has received these instructions in written format and have expressed an understanding of the discharge instructions. The patient is aware that any significant change in condition or worsening of symptoms should prompt an immediate return to this or the closest emergency  department or call to 911.  I have explained discharge medications and the need for follow up with the patient/caretakers. This was also printed in the discharge instructions. Patient was discharged with the following medications and follow up:      Medication List      No changes were made to your prescriptions during this visit.      Hanna Pereira MD  [address] [id number]  [phone]    Call in 2 days         Final diagnoses:   Panic attack        ED Disposition       ED Disposition   Discharge    Condition   Stable    Comment   --               This medical record created using voice recognition software.             Teresa Perez, APRN  11/18/23 6968

## 2023-11-19 NOTE — DISCHARGE INSTRUCTIONS
Please follow-up with your primary care provider regarding your medications.  I would recommend that you try taking the BuSpar to see if it helps with these episodes.      Please return to the emergency department for worsening symptoms, including shortness of breath or palpitations.

## 2023-11-28 LAB
QT INTERVAL: 433 MS
QTC INTERVAL: 471 MS

## 2023-12-07 ENCOUNTER — TELEPHONE (OUTPATIENT)
Dept: INTERNAL MEDICINE | Facility: CLINIC | Age: 38
End: 2023-12-07

## 2023-12-07 LAB
027 TOXIN: NORMAL
C DIFF TOX GENS STL QL NAA+PROBE: NEGATIVE

## 2023-12-07 PROCEDURE — 87045 FECES CULTURE AEROBIC BACT: CPT | Performed by: INTERNAL MEDICINE

## 2023-12-07 PROCEDURE — 87427 SHIGA-LIKE TOXIN AG IA: CPT | Performed by: INTERNAL MEDICINE

## 2023-12-07 PROCEDURE — 87046 STOOL CULTR AEROBIC BACT EA: CPT | Performed by: INTERNAL MEDICINE

## 2023-12-07 PROCEDURE — 87493 C DIFF AMPLIFIED PROBE: CPT | Performed by: INTERNAL MEDICINE

## 2023-12-07 PROCEDURE — 87205 SMEAR GRAM STAIN: CPT | Performed by: INTERNAL MEDICINE

## 2023-12-07 NOTE — TELEPHONE ENCOUNTER
Caller: Jessie Romero    Relationship: Self    Best call back number: [phone]     Requested Prescriptions:   Requested Prescriptions     Pending Prescriptions Disp Refills    Semaglutide-Weight Management 1.7 MG/0.75ML solution auto-injector 3 mL 0     Sig: Inject 0.75 mL under the skin into the appropriate area as directed 1 (One) Time Per Week.        Pharmacy where request should be sent: [facility]     Last office visit with prescribing clinician: 11/15/2023   Last telemedicine visit with prescribing clinician: Visit date not found   Next office visit with prescribing clinician: Visit date not found     Additional details provided by patient: STATED SHE WOULD LIKE TO RESTART THIS MEDICATION.     Does the patient have less than a 3 day supply:  [x] Yes  [] No    Would you like a call back once the refill request has been completed: [] Yes [x] No    If the office needs to give you a call back, can they leave a voicemail: [] Yes [x] No    Caity Hodge, PCT   12/07/23 14:47 EST

## 2023-12-11 LAB
BACTERIA SPEC CULT: NORMAL
BACTERIA SPEC CULT: NORMAL
CAMPYLOBACTER STL CULT: NORMAL
E COLI SXT STL QL IA: NEGATIVE
SALM + SHIG STL CULT: NORMAL

## 2023-12-12 ENCOUNTER — OFFICE VISIT (OUTPATIENT)
Dept: BEHAVIORAL HEALTH | Facility: CLINIC | Age: 38
End: 2023-12-12
Payer: COMMERCIAL

## 2023-12-12 ENCOUNTER — TELEPHONE (OUTPATIENT)
Dept: PSYCHIATRY | Facility: CLINIC | Age: 38
End: 2023-12-12
Payer: COMMERCIAL

## 2023-12-12 VITALS
WEIGHT: 275.06 LBS | SYSTOLIC BLOOD PRESSURE: 124 MMHG | HEART RATE: 63 BPM | DIASTOLIC BLOOD PRESSURE: 81 MMHG | OXYGEN SATURATION: 97 % | HEIGHT: 65 IN | BODY MASS INDEX: 45.83 KG/M2

## 2023-12-12 DIAGNOSIS — F43.10 POST TRAUMATIC STRESS DISORDER (PTSD): ICD-10-CM

## 2023-12-12 DIAGNOSIS — F33.2 SEVERE EPISODE OF RECURRENT MAJOR DEPRESSIVE DISORDER, WITHOUT PSYCHOTIC FEATURES: Primary | ICD-10-CM

## 2023-12-12 DIAGNOSIS — F51.01 PRIMARY INSOMNIA: ICD-10-CM

## 2023-12-12 DIAGNOSIS — F41.1 GENERALIZED ANXIETY DISORDER: ICD-10-CM

## 2023-12-12 RX ORDER — ESCITALOPRAM OXALATE 10 MG/1
10 TABLET ORAL DAILY
Qty: 60 TABLET | Refills: 1 | Status: SHIPPED | OUTPATIENT
Start: 2023-12-12

## 2023-12-12 RX ORDER — TRAZODONE HYDROCHLORIDE 50 MG/1
50-100 TABLET ORAL
Qty: 60 TABLET | Refills: 1 | Status: SHIPPED | OUTPATIENT
Start: 2023-12-12

## 2023-12-12 NOTE — PROGRESS NOTES
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 benefits, side effects discussed with patient including GI upset, sedation, dizziness/falls risk, grogginess the following day, prolongation of the QTc interval.  After discussion of these risks and benefits, the patient voiced understanding and agreed to proceed.   Medications:   New Medications Ordered This Visit   Medications    escitalopram (Lexapro) 10 MG tablet     Sig: Take 1 tablet by mouth Daily.     Dispense:  60 tablet     Refill:  1    traZODone (DESYREL) 50 MG tablet     Sig: Take 1-2 tablets by mouth every night at bedtime.     Dispense:  60 tablet     Refill:  1      VENKATA reviewed.   Discussed medication options and treatment plan of prescribed medication as well as the risks, benefits, and side effects.  Patient verbalized understanding and is agreeable to this plan.   Patient is agreeable to call the office with any worsening of symptoms or onset of side effects.   Patient is agreeable to call 911 or go to the nearest ER should he/she begin having SI/HI.   Continue psychotherapeutic modalities as indicated.  Continue to challenge patterns of living conducive to pathology, strengthen defenses, promote problems solving, restore adaptive functioning and provide symptom relief.   Patient acknowledged and verbally consented to  continue with current treatment plan and was educated on the importance of compliance with treatment and follow-up appointments.  Addressed all questions and concerns.     Psychotherapy:      30 minutes of supportive psychotherapy with goal to strengthen defenses, promote problems solving, restore adaptive functioning and provide symptom relief. The therapeutic alliance was strengthened to encourage the patient to express their thoughts and feelings. Esteem building was enhanced through praise, reassurance, normalizing and encouragement. Coping skills were enhanced to build distress tolerance skills and emotional regulation. Allowed patient to freely discuss issues without interruption or judgement with unconditional positive regard, active listening skills, and empathy. Provided a safe, confidential environment to facilitate the development of a positive therapeutic relationship and encourage open, honest communication. Assisted patient in identifying risk factors which would indicate the need for higher level of care including thoughts to harm self or others and/or self-harming behavior and encouraged patient to contact this office, call 911, or present to the nearest emergency room should any of these events occur. Assisted patient in processing session content; acknowledged and normalized patient’s thoughts, feelings, and concerns by utilizing a person-centered approach in efforts to build appropriate rapport and a positive therapeutic relationship with open and honest communication. Patient given education on medication side effects, diagnosis/illness and relapse symptoms. Plan to continue supportive psychotherapy in next appointment to provide symptom relief.      Functional status:Good  Prognosis: Good  Progress: Continued improvement    Safety/Risk Assessment: Risk of self-harm acutely and chronically is moderate.    Risk factors include anxiety disorder, mood disorder, and recent psychosocial stressors.    Protective factors include no family history, denies access to guns/weapons, no present SI, no history of suicide attempts or self-harm in the past, minimal AODA, healthcare seeking, future orientation, willingness to engage in care.    Risk assessment could be further elevated in the event of treatment noncompliance and/or AODA.    Follow-up: Return in about 1 month (around 1/12/2024) for Next scheduled follow up.         This document has been electronically signed by LILLIAM Stanford  December 26, 2023 18:29 EST    Please note that portions of this note were completed with a voice recognition program.  Copied text in this note has been reviewed and is accurate as of 12/26/23

## 2023-12-14 LAB
WBC STL QL MICRO: NORMAL
WBC STL QL MICRO: NORMAL

## 2023-12-14 RX ORDER — SEMAGLUTIDE 0.25 MG/.5ML
0.25 INJECTION, SOLUTION SUBCUTANEOUS WEEKLY
Qty: 2 ML | Refills: 0 | Status: SHIPPED | OUTPATIENT
Start: 2023-12-14

## 2023-12-14 NOTE — TELEPHONE ENCOUNTER
Called and informed pt that medication was sent into pharmacy at a lower dose and there is a shortage on medication so there was no guarantee they would have medication in stock or when it would be available. Pt stated she understood.

## 2023-12-14 NOTE — TELEPHONE ENCOUNTER
Pt stated she hasn't been on the medication for a while, since November 15, pt states she is still having the same symptoms, but she said she liked the medication. Pt stated she thinks there is something else going on that isn't related to the medication as she stated she had the symptoms when she started the medication.

## 2023-12-14 NOTE — TELEPHONE ENCOUNTER
Since she has been off for >2 weeks she would need to start back at the lower dose. I can can send this in for her, but please advise that this dose is on national back order so may not be available.

## 2023-12-26 NOTE — PATIENT INSTRUCTIONS
1.  Please return to clinic at your next scheduled visit.  Please contact the clinic ([phone]) at least 24 hours prior in the event you need to cancel.  2.  Do no harm to yourself or others.    3.  Avoid alcohol and drugs.    4.  Take all medications as prescribed.  Please contact the clinic with any concerns. If you are in need of medication refills, please call the clinic at [phone].    5. Should you want to get in touch with your provider, LILLIAM Stanford, please contact the office ([phone]), and staff will be able to page Kiersten directly.  6. In the event you have personal crisis, contact the following crisis numbers: Suicide Prevention Hotline 1-374.340.5892; [facility] Helpline 7-479-190-QPRX; [facility] Emergency Room [phone]; text HELLO to 705166; or 240.     SPECIFIC RECOMMENDATIONS:     1.      Medications discussed at this encounter:     New Medications Ordered This Visit   Medications    escitalopram (Lexapro) 10 MG tablet     Sig: Take 1 tablet by mouth Daily.     Dispense:  60 tablet     Refill:  1    traZODone (DESYREL) 50 MG tablet     Sig: Take 1-2 tablets by mouth every night at bedtime.     Dispense:  60 tablet     Refill:  1                       2.      Psychotherapy recommendations: We will continue therapy at future visits.     3.     Return to clinic: Return in about 1 month (around 1/12/2024) for Next scheduled follow up.

## 2024-01-03 ENCOUNTER — E-VISIT (OUTPATIENT)
Dept: ADMINISTRATIVE | Facility: OTHER | Age: 39
End: 2024-01-03
Payer: COMMERCIAL

## 2024-01-03 NOTE — E-VISIT ESCALATED
Chief Complaint: Low back pain   Patient was shown the following escalation message:   Some conditions need immediate, in-person medical attention   Back pain with numbness in both legs, the groin, buttocks, perineum, and inner thighs may suggest a more serious condition. You should get care right now.   ----------   Patient Interview Transcript:   Where on your lower back do you feel pain? This interview treats low back pain only. Select one.    Along the spine or bony part of my back   Not selected:    Right side    Left side    Both sides   Since your back pain started, have you had any of these stomach- or bladder-related symptoms? Select all that apply.    None of these   Not selected:    Abdominal pain or discomfort    Nausea    Vomiting    Pain that's worse after eating    Pelvic or lower abdominal pain    Burning with urination    Frequent urination    Blood in your urine   Do any of these statements apply to you? Select all that apply.    None of the above   Not selected:    I've been taking oral steroids such as prednisone for a long time    I have a bruise or scrape on my spine where the pain is    I have osteoporosis   How long have you had your current episode of back pain? Select one.    Less than 1 week   Not selected:    1 to 2 weeks    3 to 4 weeks    5 to 6 weeks    More than 6 weeks   What does the pain feel like? Select one.    Sharp, shooting, stabbing, or burning   Not selected:    Dull or aching    Other (please describe)   How severe is your back pain? Think about how the pain affects your everyday activities. On a scale of 1 to 10, for example, how difficult is it to get out of bed, get dressed, shower, or go to work or school? Select one.    Severe, 7 to 9; my pain is distressing, and it limits me from doing some everyday activities   Not selected:    Mild, 1 to 3; my pain is noticeable and distracting, but I am still able to do everyday activities    Moderate, 4 to 6; my pain is strong,  and it makes everyday activities uncomfortable    Unbearable, 10+; my pain is so intense that it keeps me from doing almost all everyday activities   Does the pain travel down from your lower back to your buttock, thigh, lower leg, or foot? Select one.    No   Not selected:    Yes, it travels down my right side    Yes, it travels down my left side    Yes, it travels down both sides   Since your back pain started, have you had numbness or loss of feeling in the pattern as shown?    Yes   Not selected:    No    I have an underlying condition that causes chronic numbness or loss of feeling in that area   ----------   Medical history   Medical history data does not currently exist for this patient.

## 2024-02-01 ENCOUNTER — OFFICE VISIT (OUTPATIENT)
Dept: INTERNAL MEDICINE | Facility: CLINIC | Age: 39
End: 2024-02-01
Payer: COMMERCIAL

## 2024-02-01 VITALS
SYSTOLIC BLOOD PRESSURE: 128 MMHG | HEIGHT: 65 IN | HEART RATE: 70 BPM | DIASTOLIC BLOOD PRESSURE: 82 MMHG | OXYGEN SATURATION: 96 % | BODY MASS INDEX: 48.32 KG/M2 | RESPIRATION RATE: 16 BRPM | TEMPERATURE: 98.3 F | WEIGHT: 290 LBS

## 2024-02-01 DIAGNOSIS — R19.8 UMBILICAL BLEEDING: Primary | ICD-10-CM

## 2024-02-01 DIAGNOSIS — K21.9 GERD WITHOUT ESOPHAGITIS: ICD-10-CM

## 2024-02-01 PROCEDURE — 99213 OFFICE O/P EST LOW 20 MIN: CPT | Performed by: PHYSICIAN ASSISTANT

## 2024-02-01 RX ORDER — PANTOPRAZOLE SODIUM 40 MG/1
40 TABLET, DELAYED RELEASE ORAL DAILY
Qty: 30 TABLET | Refills: 1 | Status: SHIPPED | OUTPATIENT
Start: 2024-02-01

## 2024-02-01 NOTE — PROGRESS NOTES
Chief Complaint  Bleeding from umbilicus, GERD    Subjective          Jessie Jamee Romero presents to [facility]  History of Present Illness  Pt noticed bleeding from belly button  States she was cleaning it a few days ago and started noticing blood. Blood is bright red.  Pt previously had piercing in belly button but has been out since 2006  She dries it with a qtip after every shower  Not currently/actively bleeding    GERD: states she has had flare the past wk   Sx worse with eating or drinking anything  Sx the most severe with tomato sauce, pepperoni pizza  Dr Pepper makes it better  She has tried mylanta, Prilosec otc   Admits to a lot of gas  Pt has chronic diarrhea. No worsening  Denies blood in stool.    Past Medical History:   Diagnosis Date    Allergic 2000    Moved to [place]    Anxiety     Headache 2002    Heart murmur     Myocardial infarction 03/2010    Tear of meniscus of knee         Past Surgical History:   Procedure Laterality Date    ADRENAL GLAND SURGERY Bilateral 01/01/2010    [facility] [place] DR RAO        Current Outpatient Medications on File Prior to Visit   Medication Sig Dispense Refill    albuterol sulfate  (90 Base) MCG/ACT inhaler Inhale 2 puffs Every 4 (Four) Hours As Needed for Wheezing. 8.5 g 2    Azelastine HCl 137 MCG/SPRAY solution Instill 1-2 sprays each nostril twice a day as needed for runny nose, sneezing or increased nasal allergy symptoms. 30 mL 11    budesonide-formoterol (Symbicort) 160-4.5 MCG/ACT inhaler Inhale 2 puffs every 12 hours. Use with spacer. Rinse mouth after each use 10.2 g 11    busPIRone (BUSPAR) 5 MG tablet Take 1 tablet by mouth 2 (Two) Times a Day As Needed (Anxiety). 60 tablet 1    escitalopram (Lexapro) 10 MG tablet Take 1 tablet by mouth Daily. 60 tablet 1    fexofenadine (Allegra Allergy) 180 MG tablet Take 1 tablet by mouth Daily. 90 tablet 0    fluticasone (FLONASE) 50 MCG/ACT nasal  "spray instill 2 sprays in each nostril once daily 16 g 2    montelukast (SINGULAIR) 10 MG tablet Take one tablet daily at bedtime 30 tablet 11    Rimegepant Sulfate (NURTEC) 75 MG tablet dispersible tablet Take 1 tablet by mouth As Needed (every other day as needed for migraine). 16 tablet 0    traZODone (DESYREL) 50 MG tablet Take 1-2 tablets by mouth every night at bedtime. 60 tablet 1     No current facility-administered medications on file prior to visit.        Allergies   Allergen Reactions    Morphine Hallucinations    Doxycycline Hives     Shortness of air    Adhesive Tape Rash     CANNOT USE EKG ADHESIVE STRIPS/RASH & ITCHING    Latex Rash       Social History     Tobacco Use   Smoking Status Former    Packs/day: 2.00    Years: 3.00    Additional pack years: 0.00    Total pack years: 6.00    Types: Cigarettes    Quit date: 2020    Years since quittin.0    Passive exposure: Past   Smokeless Tobacco Never          Objective   Vital Signs:   /82 (BP Location: Right arm, Patient Position: Sitting, Cuff Size: Large Adult)   Pulse 70   Temp 98.3 °F (36.8 °C) (Temporal)   Resp 16   Ht 165.1 cm (65\")   Wt 132 kg (290 lb)   SpO2 96%   BMI 48.26 kg/m²     Physical Exam  Vitals reviewed.   Constitutional:       Appearance: Normal appearance.   HENT:      Head: Normocephalic and atraumatic.      Nose: Nose normal.      Mouth/Throat:      Mouth: Mucous membranes are moist.   Eyes:      Extraocular Movements: Extraocular movements intact.      Conjunctiva/sclera: Conjunctivae normal.      Pupils: Pupils are equal, round, and reactive to light.   Cardiovascular:      Rate and Rhythm: Normal rate and regular rhythm.   Pulmonary:      Effort: Pulmonary effort is normal.      Breath sounds: Normal breath sounds.   Abdominal:      General: Abdomen is flat. Bowel sounds are normal.      Palpations: Abdomen is soft.          Comments: Small excoriation noted in umbilical area   Musculoskeletal:         " General: Normal range of motion.   Neurological:      General: No focal deficit present.      Mental Status: She is alert and oriented to person, place, and time.   Psychiatric:         Mood and Affect: Mood normal.        Result Review :                            Assessment and Plan    Diagnoses and all orders for this visit:    1. Umbilical bleeding (Primary)  Comments:  scratch noted on exam. Discussed need to avoid qtip or other things in belly button.    2. GERD without esophagitis  Assessment & Plan:  Discussed GERD symptoms, GERD diet, lifestyle changes (elevate head of bed, limit meals late at night, smaller more frequent meals). Encouraged food diary, avoid trigger foods. Will start PPI to prevent sx. Pt will keep upcoming appt with PCP for check up and labs.        Other orders  -     pantoprazole (Protonix) 40 MG EC tablet; Take 1 tablet by mouth Daily.  Dispense: 30 tablet; Refill: 1        Follow Up   Return in about 1 month (around 3/1/2024) for Follow Up with Dr. Pereira.  Patient was given instructions and counseling regarding her condition or for health maintenance advice. Please see specific information pulled into the AVS if appropriate.

## 2024-02-02 PROBLEM — K21.9 GERD WITHOUT ESOPHAGITIS: Status: ACTIVE | Noted: 2024-02-02

## 2024-02-02 NOTE — ASSESSMENT & PLAN NOTE
Discussed GERD symptoms, GERD diet, lifestyle changes (elevate head of bed, limit meals late at night, smaller more frequent meals). Encouraged food diary, avoid trigger foods. Will start PPI to prevent sx. Pt will keep upcoming appt with PCP for check up and labs.

## 2024-02-06 ENCOUNTER — OFFICE VISIT (OUTPATIENT)
Dept: BEHAVIORAL HEALTH | Facility: CLINIC | Age: 39
End: 2024-02-06
Payer: COMMERCIAL

## 2024-02-06 VITALS
HEIGHT: 65 IN | SYSTOLIC BLOOD PRESSURE: 120 MMHG | HEART RATE: 67 BPM | DIASTOLIC BLOOD PRESSURE: 63 MMHG | BODY MASS INDEX: 48.32 KG/M2 | WEIGHT: 290 LBS

## 2024-02-06 DIAGNOSIS — F41.1 GENERALIZED ANXIETY DISORDER: ICD-10-CM

## 2024-02-06 DIAGNOSIS — F43.10 POST TRAUMATIC STRESS DISORDER (PTSD): ICD-10-CM

## 2024-02-06 DIAGNOSIS — F33.2 SEVERE EPISODE OF RECURRENT MAJOR DEPRESSIVE DISORDER, WITHOUT PSYCHOTIC FEATURES: Primary | ICD-10-CM

## 2024-02-06 DIAGNOSIS — F51.01 PRIMARY INSOMNIA: ICD-10-CM

## 2024-02-06 NOTE — PATIENT INSTRUCTIONS
1.  Please return to clinic at your next scheduled visit.  Please contact the clinic ([phone]) at least 24 hours prior in the event you need to cancel.  2.  Do no harm to yourself or others.    3.  Avoid alcohol and drugs.    4.  Take all medications as prescribed.  Please contact the clinic with any concerns. If you are in need of medication refills, please call the clinic at [phone].    5. Should you want to get in touch with your provider, LILLIAM Stanford, please contact the office ([phone]), and staff will be able to page Kiersten directly.  6. In the event you have personal crisis, contact the following crisis numbers: Suicide Prevention Hotline 1-884.355.2382; [facility] Helpline 6-097-688-HXMO; [facility] Emergency Room [phone]; text HELLO to 801766; or 990.     SPECIFIC RECOMMENDATIONS:     1.      Medications discussed at this encounter:     No orders of the defined types were placed in this encounter.                      2.      Psychotherapy recommendations: We will continue therapy at future visits.     3.     Return to clinic: Return in about 3 months (around 5/6/2024) for Next scheduled follow up.

## 2024-02-06 NOTE — PROGRESS NOTES
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 treatment plan and was educated on the importance of compliance with treatment and follow-up appointments.  Addressed all questions and concerns.     Psychotherapy:      40 minutes of supportive psychotherapy with goal to strengthen defenses, promote problems solving, restore adaptive functioning and provide symptom relief. The therapeutic alliance was strengthened to encourage the patient to express their thoughts and feelings. Esteem building was enhanced through praise, reassurance, normalizing and encouragement. Coping skills were enhanced to build distress tolerance skills and emotional regulation. Allowed patient to freely discuss issues without interruption or judgement with unconditional positive regard, active listening skills, and empathy. Provided a safe, confidential environment to facilitate the development of a positive therapeutic relationship and encourage open, honest communication. Assisted patient in identifying risk factors which would indicate the need for higher level of care including thoughts to harm self or others and/or self-harming behavior and encouraged patient to contact this office, call 911, or present to the nearest emergency room should any of these events occur. Assisted  patient in processing session content; acknowledged and normalized patient’s thoughts, feelings, and concerns by utilizing a person-centered approach in efforts to build appropriate rapport and a positive therapeutic relationship with open and honest communication. Patient given education on medication side effects, diagnosis/illness and relapse symptoms. Plan to continue supportive psychotherapy in next appointment to provide symptom relief.      Functional status:Good  Prognosis: Good  Progress: Continued improvement    Safety/Risk Assessment: Risk of self-harm acutely and chronically is moderate.    Risk factors include anxiety disorder, mood disorder, and recent psychosocial stressors.   Protective factors include no family history, denies access to guns/weapons, no present SI, no history of suicide attempts or self-harm in the past, minimal AODA, healthcare seeking, future orientation, willingness to engage in care.    Risk assessment could be further elevated in the event of treatment noncompliance and/or AODA.    Follow-up: Return in about 3 months (around 5/6/2024) for Next scheduled follow up.         This document has been electronically signed by LILLIAM Stanford  February 21, 2024 14:12 EST    Please note that portions of this note were completed with a voice recognition program.  Copied text in this note has been reviewed and is accurate as of 02/21/24

## 2024-02-12 ENCOUNTER — TELEPHONE (OUTPATIENT)
Dept: INTERNAL MEDICINE | Facility: CLINIC | Age: 39
End: 2024-02-12

## 2024-02-12 NOTE — TELEPHONE ENCOUNTER
Caller: Jessie Romero    Relationship: Self    Best call back number: [phone]     What is the best time to reach you: ANYTIME     Who are you requesting to speak with (clinical staff, provider,  specific staff member): CLINICAL     What was the call regarding: PATIENT IS CALLING IN REGARDS TO WEGOVY, AND IF THERE IS OTHER ALTERNATIVE MEDICATIONS, DUE TO BACK ORDERS, PATIENT DID STATE THAT SHE RATHER NOT GO WITH [facility].

## 2024-02-14 NOTE — TELEPHONE ENCOUNTER
Spoke with patient rely message, pt is frustrated she states she does not understand why she has gain all that weight and she is frustrated with with this answer, she states she has heard about another weight loss medication and she thought she could change to that since wegovy is out of stock.

## 2024-02-19 ENCOUNTER — TELEPHONE (OUTPATIENT)
Dept: INTERNAL MEDICINE | Facility: CLINIC | Age: 39
End: 2024-02-19
Payer: COMMERCIAL

## 2024-02-19 DIAGNOSIS — N94.6 DYSMENORRHEA: Primary | ICD-10-CM

## 2024-02-19 NOTE — TELEPHONE ENCOUNTER
Caller: Jessie Romero    Relationship: Self    Best call back number: [phone]    What is the medical concern/diagnosis: VAGINAL BLEEDING/CRAMPS     What specialty or service is being requested: OBGYN     Any additional details: PATIENT WOULD LIKE PROVIDER TO BE FEMALE AND IS OKAY WITH [facility] AND [facility]

## 2024-03-04 ENCOUNTER — OFFICE VISIT (OUTPATIENT)
Dept: INTERNAL MEDICINE | Facility: CLINIC | Age: 39
End: 2024-03-04
Payer: COMMERCIAL

## 2024-03-04 VITALS
HEART RATE: 78 BPM | TEMPERATURE: 97.3 F | RESPIRATION RATE: 18 BRPM | SYSTOLIC BLOOD PRESSURE: 114 MMHG | BODY MASS INDEX: 48.82 KG/M2 | WEIGHT: 293 LBS | DIASTOLIC BLOOD PRESSURE: 74 MMHG | HEIGHT: 65 IN | OXYGEN SATURATION: 98 %

## 2024-03-04 DIAGNOSIS — M54.50 LUMBAR BACK PAIN: Primary | ICD-10-CM

## 2024-03-04 DIAGNOSIS — Z00.00 ANNUAL PHYSICAL EXAM: ICD-10-CM

## 2024-03-04 DIAGNOSIS — M54.16 LUMBAR RADICULOPATHY: ICD-10-CM

## 2024-03-04 DIAGNOSIS — G43.009 MIGRAINE WITHOUT AURA AND WITHOUT STATUS MIGRAINOSUS, NOT INTRACTABLE: ICD-10-CM

## 2024-03-04 DIAGNOSIS — E66.01 CLASS 3 SEVERE OBESITY DUE TO EXCESS CALORIES WITHOUT SERIOUS COMORBIDITY WITH BODY MASS INDEX (BMI) OF 50.0 TO 59.9 IN ADULT: ICD-10-CM

## 2024-03-04 RX ORDER — SUMATRIPTAN 50 MG/1
TABLET, FILM COATED ORAL
Qty: 9 TABLET | Refills: 2 | Status: SHIPPED | OUTPATIENT
Start: 2024-03-04

## 2024-03-04 NOTE — ASSESSMENT & PLAN NOTE
Patient's (Body mass index is 50.98 kg/m².) indicates that they are morbidly/severely obese (BMI > 40 or > 35 with obesity - related health condition) with health conditions that include none . Weight is worsening. BMI  is above average; BMI management plan is completed. We discussed portion control, increasing exercise, and consulting a Bariatric surgeon.

## 2024-03-04 NOTE — PROGRESS NOTES
"Chief Complaint  Follow-up (1 month follow up for gerd and umbilical bleeding, also weight gain concerns, also dr mitchell from the hospital wants her to get mri of spine for numbness and tingling sensation in hands and feet and sciatic area, nurtec is not helping and she tried to take it on a daily basis as well as needing for the migraines )    Subjective      History of Present Illness    Jessie Romero is a 38 y.o. female who presents to [facility]     GERD well controlled on Prtonix.    Spoke with Dr. Mitchell at the hospital and he suggested MRI.     Migraines occurring in sets of 3 once a month.     Concerned about weight gain since stopping Wegovy    Objective   Vital Signs:   Vitals:    03/04/24 1509   BP: 114/74   BP Location: Left arm   Patient Position: Sitting   Cuff Size: Adult   Pulse: 78   Resp: 18   Temp: 97.3 °F (36.3 °C)   TempSrc: Temporal   SpO2: 98%   Weight: (!) 139 kg (306 lb 6 oz)   Height: 165.1 cm (65\")     Body mass index is 50.98 kg/m².    Wt Readings from Last 3 Encounters:   03/04/24 (!) 139 kg (306 lb 6 oz)   02/06/24 132 kg (290 lb)   02/01/24 132 kg (290 lb)     BP Readings from Last 3 Encounters:   03/04/24 114/74   02/06/24 120/63   02/01/24 128/82       Health Maintenance   Topic Date Due    TDAP/TD VACCINES (2 - Tdap) 09/16/2012    HEPATITIS C SCREENING  Never done    ANNUAL PHYSICAL  Never done    PAP SMEAR  Never done    COVID-19 Vaccine (3 - 2023-24 season) 09/01/2023    BMI FOLLOWUP  03/04/2025    INFLUENZA VACCINE  Completed    Pneumococcal Vaccine 0-64  Aged Out       Physical Exam  Vitals reviewed.   Constitutional:       Appearance: Normal appearance. She is well-developed.   HENT:      Head: Normocephalic and atraumatic.      Mouth/Throat:      Pharynx: No oropharyngeal exudate.   Eyes:      Conjunctiva/sclera: Conjunctivae normal.      Pupils: Pupils are equal, round, and reactive to light.   Neck:      Thyroid: No " thyromegaly or thyroid tenderness.   Cardiovascular:      Rate and Rhythm: Normal rate and regular rhythm.      Heart sounds: No murmur heard.     No friction rub. No gallop.   Pulmonary:      Effort: Pulmonary effort is normal.      Breath sounds: Normal breath sounds. No wheezing or rhonchi.   Lymphadenopathy:      Cervical: No cervical adenopathy.   Skin:     General: Skin is warm and dry.   Neurological:      Mental Status: She is alert and oriented to person, place, and time.   Psychiatric:         Mood and Affect: Affect normal.          Result Review :  The following data was reviewed by: Hanna Pereira MD on 03/04/2024:  CMP          9/23/2023    18:14 11/15/2023    15:51 11/18/2023    18:23   CMP   Glucose 115  82  137    BUN 21  18  13    Creatinine 0.81  0.77  0.72    EGFR 96.6  102.0  110.6    Sodium 139  141  137    Potassium 3.9  3.8  3.5    Chloride 103  105  102    Calcium 9.4  9.3  8.9    Total Protein 7.3  7.2  6.9    Albumin 4.7  4.5  4.3    Globulin 2.6  2.7  2.6    Total Bilirubin 0.4  <0.2  0.2    Alkaline Phosphatase 89  82  83    AST (SGOT) 14  15  13    ALT (SGPT) 10  8  10    Albumin/Globulin Ratio 1.8  1.7  1.7    BUN/Creatinine Ratio 25.9  23.4  18.1    Anion Gap 12.2  10.3  9.0      CBC          9/23/2023    18:14 11/15/2023    15:51 11/18/2023    18:23   CBC   WBC 10.36  9.27  9.18    RBC 4.71  4.59  4.47    Hemoglobin 12.6  12.7  11.8    Hematocrit 39.5  38.6  38.0    MCV 83.9  84.1  85.0    MCH 26.8  27.7  26.4    MCHC 31.9  32.9  31.1    RDW 14.7  13.7  14.6    Platelets 321  410  327      Lipid Panel          4/24/2023    16:02   Lipid Panel   Total Cholesterol 174    Triglycerides 181    HDL Cholesterol 44    VLDL Cholesterol 31    LDL Cholesterol  99    LDL/HDL Ratio 2.13      TSH          4/24/2023    16:02 11/18/2023    18:23   TSH   TSH 1.060  2.200      Most Recent A1C          4/24/2023    16:02   HGBA1C Most Recent   Hemoglobin A1C 5.40           Procedures           Assessment & Plan  Lumbar back pain    Lumbar radiculopathy  MRI ordered to assess. Has already completed PT in past.   Class 3 severe obesity due to excess calories without serious comorbidity with body mass index (BMI) of 50.0 to 59.9 in adult  Patient's (Body mass index is 50.98 kg/m².) indicates that they are morbidly/severely obese (BMI > 40 or > 35 with obesity - related health condition) with health conditions that include none . Weight is worsening. BMI  is above average; BMI management plan is completed. We discussed portion control, increasing exercise, and consulting a Bariatric surgeon.   Migraine without aura and without status migrainosus, not intractable  Did not respond well to Nurtec.   Will trial Imitrex      Orders Placed This Encounter   Procedures    MRI Lumbar Spine Without Contrast    Comprehensive Metabolic Panel    Hemoglobin A1c    Lipid Panel    TSH    Ambulatory Referral to Bariatric Surgery    CBC & Differential     New Medications Ordered This Visit   Medications    SUMAtriptan (Imitrex) 50 MG tablet     Sig: Take one tablet at onset of headache. May repeat dose one time in 2 hours if headache not relieved.     Dispense:  9 tablet     Refill:  2                    FOLLOW UP  Return in about 3 months (around 6/4/2024).  Patient was given instructions and counseling regarding her condition or for health maintenance advice. Please see specific information pulled into the AVS if appropriate.       Hanna Pereira MD  03/06/24  15:53 EST    CURRENT & DISCONTINUED MEDICATIONS  Current Outpatient Medications   Medication Instructions    albuterol sulfate  (90 Base) MCG/ACT inhaler 2 puffs, Inhalation, Every 4 Hours PRN    Azelastine HCl 137 MCG/SPRAY solution Instill 1-2 sprays each nostril twice a day as needed for runny nose, sneezing or increased nasal allergy symptoms.    budesonide-formoterol (Symbicort) 160-4.5 MCG/ACT inhaler Inhale 2 puffs every 12 hours. Use with  spacer. Rinse mouth after each use    busPIRone (BUSPAR) 5 mg, Oral, 2 Times Daily PRN    escitalopram (LEXAPRO) 10 mg, Oral, Daily    fexofenadine (ALLEGRA ALLERGY) 180 mg, Oral, Daily    fluticasone (FLONASE) 50 MCG/ACT nasal spray instill 2 sprays in each nostril once daily    montelukast (SINGULAIR) 10 MG tablet Take one tablet daily at bedtime    pantoprazole (PROTONIX) 40 mg, Oral, Daily    SUMAtriptan (Imitrex) 50 MG tablet Take one tablet at onset of headache. May repeat dose one time in 2 hours if headache not relieved.    traZODone (DESYREL)  mg, Oral, Every Night at Bedtime       Medications Discontinued During This Encounter   Medication Reason    Rimegepant Sulfate (NURTEC) 75 MG tablet dispersible tablet Alternate therapy

## 2024-03-18 ENCOUNTER — HOSPITAL ENCOUNTER (OUTPATIENT)
Dept: MRI IMAGING | Facility: HOSPITAL | Age: 39
Discharge: HOME | End: 2024-03-18
Admitting: INTERNAL MEDICINE
Payer: COMMERCIAL

## 2024-03-18 DIAGNOSIS — M54.16 LUMBAR RADICULOPATHY: ICD-10-CM

## 2024-03-18 DIAGNOSIS — M54.50 LUMBAR BACK PAIN: ICD-10-CM

## 2024-03-18 PROCEDURE — 72148 MRI LUMBAR SPINE W/O DYE: CPT

## 2024-03-20 RX ORDER — ALBUTEROL SULFATE 90 UG/1
2 AEROSOL, METERED RESPIRATORY (INHALATION) EVERY 4 HOURS PRN
Qty: 8.5 G | Refills: 2 | Status: SHIPPED | OUTPATIENT
Start: 2024-03-20

## 2024-03-20 RX ORDER — PANTOPRAZOLE SODIUM 40 MG/1
40 TABLET, DELAYED RELEASE ORAL DAILY
Qty: 30 TABLET | Refills: 1 | Status: SHIPPED | OUTPATIENT
Start: 2024-03-20

## 2024-04-25 NOTE — PROGRESS NOTES
Chief Complaint   Patient presents with    Gynecologic Exam     Discuss painful menses       HPI:   38 y.o. . Presents for well woman exam. Contraception:  Abstinence  Menses:   q month, lasts 4-5 days, changes ultra pad q 3 hrs on heaviest days.   Pain:  Moderate, not too bad, have always been painful, previous use of flexeril for management of menstrual cramps, currently tolerable with use of pamprin,   Last pap: normal  History  of ovarian cyst    Patient reports that she is not currently experiencing any symptoms of urinary incontinence.     Past Medical History:   Diagnosis Date    Abnormal Pap smear of cervix     Allergic     Moved to [place]    Anxiety     Asthma     Depression     Headache     Heart murmur     Migraine     Myocardial infarction 2010    Ovarian cyst     Tear of meniscus of knee       Past Surgical History:   Procedure Laterality Date    ADRENAL GLAND SURGERY Bilateral 2010    [facility] [place] [address] [facility]    HYSTEROSCOPY LEEP BIOPSY        Family History   Problem Relation Age of Onset    Alcohol abuse Father         Sober 25 years now    Hyperlipidemia Mother     Thyroid disease Mother     Scoliosis Mother     No Known Problems Brother     No Known Problems Sister     COPD Paternal Grandfather         Smoker    No Known Problems Paternal Grandmother     Arthritis Maternal Grandmother     COPD Maternal Grandmother         Smoker    No Known Problems Maternal Grandfather     Alcohol abuse Maternal Aunt         Dead    No Known Problems Maternal Uncle     No Known Problems Paternal Aunt     No Known Problems Paternal Uncle     No Known Problems Cousin     Ovarian cancer Maternal Great-Grandmother     Gallbladder disease Other         gallbladder problems run in the family    Colon cancer Other         colon cancer-- great great aunt    ADD / ADHD Neg Hx     Anxiety disorder Neg Hx     Bipolar disorder Neg Hx     Dementia Neg Hx     Depression Neg Hx     Drug abuse  "Neg Hx     OCD Neg Hx     Paranoid behavior Neg Hx     Schizophrenia Neg Hx     Seizures Neg Hx     Self-Injurious Behavior  Neg Hx     Suicide Attempts Neg Hx     Breast cancer Neg Hx     Uterine cancer Neg Hx      Allergies as of 04/29/2024 - Reviewed 04/29/2024   Allergen Reaction Noted    Morphine Hallucinations 08/16/2021    Doxycycline Hives 09/26/2022    Adhesive tape Rash 08/16/2021    Latex Rash 08/16/2021        PCP: does manage PMHx and preventative labs    /82   Pulse 70   Ht 165.1 cm (65\")   Wt (!) 137 kg (302 lb 6.4 oz)   LMP 04/22/2024 (Within Days)   BMI 50.32 kg/m²     PHYSICAL EXAM: Chaperone present   General- NAD, alert and oriented, appropriate  Psych- Normal mood, good memory  Neck- No masses, no thyroid enlargement  Lymphatic- No palpable neck, axillary, or groin nodes  CV- Regular rhythm, no murmurs  Resp- CTA to bases, no wheezes  Abdomen- Soft, non distended, non tender, no masses  Breast left-  Bilaterally symmetrical, no masses, non tender, no nipple discharge  Breast right- Bilaterally symmetrical, no masses, non tender, no nipple discharge  External genitalia- Normal female, no lesions  Urethra/meatus- Normal, no masses, non tender, no prolapse  Bladder- Normal, no masses, non tender, no prolapse  Vagina- Normal, no atrophy, no lesions, no discharge, no prolapse  Cvx- Normal, no lesions, no discharge, No cervical motion tenderness  Uterus- Normal size, shape & consistency.  Non tender, mobile.  Adnexa- Limited exam secondary to body habitus, no mass, non tender  Anus/Rectum/Perineum- Not performed  Ext- No edema, no cyanosis    Skin- No lesions, no rashes, no acanthosis nigricans       ASSESSMENT and PLAN:    Diagnoses and all orders for this visit:    1. Well woman exam (Primary)  -     IgP, Aptima HPV      Preventative:   BREAST HEALTH- Monthly self breast exam importance and how to reviewed. MMG and/or MRI (prn) reviewed per society guidelines and her individual history. " Screening Imaging: Not medically needed  CERVICAL CANCER Screening- Reviewed current ASCCP guidelines for screening w and wo cotest HPV, age specific.  Screen: Updated today  COLON CANCER Screening- Reviewed current medical society guidelines and options.  Screen:  Not medically needed  SEXUAL HEALTH: Declines STD screening  BONE HEALTH- Reviewed current medical society guidelines and options for testing, calcium and vit D intake.  Weight bearing exercise.  DEXA: Not medically needed  VACCINATIONS Recommended: Up to date  Importance discussed, risk being unvaccinated reviewed.  Questions answered  Genetic testing: Does not qualify.  Smoking status- NON SMOKER  Follow up PCP/Specialist PMHx and Labs  TRACK MENSES, RTO if <q21d, >7d long, heavy or painful.    All BIRTH CONTROL options R/B/A/SE/E of each reviewed in detail.  SAFE SEX/condoms importance reviewed.    Declines contraception or management menstrual cramps    Follow Up:  Return in about 1 year (around 4/29/2025).        Barbi Gleason, APRN  04/29/2024

## 2024-04-29 ENCOUNTER — OFFICE VISIT (OUTPATIENT)
Dept: OBSTETRICS AND GYNECOLOGY | Facility: CLINIC | Age: 39
End: 2024-04-29
Payer: COMMERCIAL

## 2024-04-29 VITALS
HEART RATE: 70 BPM | WEIGHT: 293 LBS | HEIGHT: 65 IN | SYSTOLIC BLOOD PRESSURE: 126 MMHG | DIASTOLIC BLOOD PRESSURE: 82 MMHG | BODY MASS INDEX: 48.82 KG/M2

## 2024-04-29 DIAGNOSIS — Z01.419 WELL WOMAN EXAM: Primary | ICD-10-CM

## 2024-04-29 PROCEDURE — 99385 PREV VISIT NEW AGE 18-39: CPT | Performed by: NURSE PRACTITIONER

## 2024-04-29 PROCEDURE — 99459 PELVIC EXAMINATION: CPT | Performed by: NURSE PRACTITIONER

## 2024-04-29 PROCEDURE — G0123 SCREEN CERV/VAG THIN LAYER: HCPCS | Performed by: NURSE PRACTITIONER

## 2024-04-29 PROCEDURE — 87624 HPV HI-RISK TYP POOLED RSLT: CPT | Performed by: NURSE PRACTITIONER

## 2024-05-02 LAB
CYTOLOGIST CVX/VAG CYTO: NORMAL
CYTOLOGY CVX/VAG DOC CYTO: NORMAL
CYTOLOGY CVX/VAG DOC THIN PREP: NORMAL
DX ICD CODE: NORMAL
HPV I/H RISK 4 DNA CVX QL PROBE+SIG AMP: NEGATIVE
Lab: NORMAL
OTHER STN SPEC: NORMAL
STAT OF ADQ CVX/VAG CYTO-IMP: NORMAL

## 2024-05-03 PROBLEM — J45.909 ASTHMA: Status: ACTIVE | Noted: 2024-05-03

## 2024-05-03 PROBLEM — R12 HEARTBURN: Status: ACTIVE | Noted: 2024-05-03

## 2024-05-03 PROBLEM — I25.2 HISTORY OF HEART ATTACK: Status: ACTIVE | Noted: 2024-05-03

## 2024-05-03 PROBLEM — F41.8 DEPRESSION WITH ANXIETY: Status: ACTIVE | Noted: 2024-05-03

## 2024-05-06 ENCOUNTER — OFFICE VISIT (OUTPATIENT)
Dept: BEHAVIORAL HEALTH | Facility: CLINIC | Age: 39
End: 2024-05-06
Payer: COMMERCIAL

## 2024-05-06 ENCOUNTER — CONSULT (OUTPATIENT)
Dept: BARIATRICS/WEIGHT MGMT | Facility: CLINIC | Age: 39
End: 2024-05-06
Payer: COMMERCIAL

## 2024-05-06 VITALS
SYSTOLIC BLOOD PRESSURE: 140 MMHG | DIASTOLIC BLOOD PRESSURE: 86 MMHG | HEART RATE: 74 BPM | WEIGHT: 293 LBS | TEMPERATURE: 98.1 F | BODY MASS INDEX: 48.82 KG/M2 | HEIGHT: 65 IN

## 2024-05-06 VITALS
WEIGHT: 293 LBS | HEIGHT: 64 IN | BODY MASS INDEX: 50.02 KG/M2 | DIASTOLIC BLOOD PRESSURE: 79 MMHG | HEART RATE: 72 BPM | OXYGEN SATURATION: 95 % | SYSTOLIC BLOOD PRESSURE: 143 MMHG

## 2024-05-06 DIAGNOSIS — F43.10 POST TRAUMATIC STRESS DISORDER (PTSD): ICD-10-CM

## 2024-05-06 DIAGNOSIS — G47.10 HYPERSOMNOLENCE: ICD-10-CM

## 2024-05-06 DIAGNOSIS — E66.01 CLASS 3 SEVERE OBESITY DUE TO EXCESS CALORIES WITHOUT SERIOUS COMORBIDITY WITH BODY MASS INDEX (BMI) OF 50.0 TO 59.9 IN ADULT: Primary | ICD-10-CM

## 2024-05-06 DIAGNOSIS — F41.1 GENERALIZED ANXIETY DISORDER: ICD-10-CM

## 2024-05-06 DIAGNOSIS — F41.8 DEPRESSION WITH ANXIETY: ICD-10-CM

## 2024-05-06 DIAGNOSIS — F33.2 SEVERE EPISODE OF RECURRENT MAJOR DEPRESSIVE DISORDER, WITHOUT PSYCHOTIC FEATURES: Primary | ICD-10-CM

## 2024-05-06 DIAGNOSIS — R55 SYNCOPE, UNSPECIFIED SYNCOPE TYPE: ICD-10-CM

## 2024-05-06 DIAGNOSIS — F51.01 PRIMARY INSOMNIA: ICD-10-CM

## 2024-05-06 DIAGNOSIS — K21.9 GERD WITHOUT ESOPHAGITIS: ICD-10-CM

## 2024-05-06 DIAGNOSIS — F33.2 SEVERE EPISODE OF RECURRENT MAJOR DEPRESSIVE DISORDER, WITHOUT PSYCHOTIC FEATURES: ICD-10-CM

## 2024-05-06 PROBLEM — I25.2 HISTORY OF HEART ATTACK: Status: RESOLVED | Noted: 2024-05-03 | Resolved: 2024-05-06

## 2024-05-06 PROBLEM — I95.1 ORTHOSTATIC HYPOTENSION: Status: RESOLVED | Noted: 2022-09-26 | Resolved: 2024-05-06

## 2024-05-06 PROCEDURE — 99215 OFFICE O/P EST HI 40 MIN: CPT | Performed by: NURSE PRACTITIONER

## 2024-05-06 PROCEDURE — 99417 PROLNG OP E/M EACH 15 MIN: CPT | Performed by: NURSE PRACTITIONER

## 2024-05-06 PROCEDURE — 90836 PSYTX W PT W E/M 45 MIN: CPT

## 2024-05-06 PROCEDURE — 99214 OFFICE O/P EST MOD 30 MIN: CPT

## 2024-05-06 RX ORDER — TRAZODONE HYDROCHLORIDE 50 MG/1
50-100 TABLET ORAL
Qty: 60 TABLET | Refills: 1 | Status: SHIPPED | OUTPATIENT
Start: 2024-05-06

## 2024-05-06 RX ORDER — BUSPIRONE HYDROCHLORIDE 5 MG/1
5 TABLET ORAL 2 TIMES DAILY PRN
Qty: 60 TABLET | Refills: 1 | Status: SHIPPED | OUTPATIENT
Start: 2024-05-06

## 2024-05-06 RX ORDER — ESCITALOPRAM OXALATE 10 MG/1
10 TABLET ORAL DAILY
Qty: 30 TABLET | Refills: 1 | Status: SHIPPED | OUTPATIENT
Start: 2024-05-06

## 2024-05-06 RX ORDER — ESCITALOPRAM OXALATE 10 MG/1
10 TABLET ORAL DAILY
Qty: 60 TABLET | Refills: 1 | Status: CANCELLED | OUTPATIENT
Start: 2024-05-06

## 2024-05-06 NOTE — PATIENT INSTRUCTIONS
1.  Please return to clinic at your next scheduled visit.  Please contact the clinic ([phone]) at least 24 hours prior in the event you need to cancel.  2.  Do no harm to yourself or others.    3.  Avoid alcohol and drugs.    4.  Take all medications as prescribed.  Please contact the clinic with any concerns. If you are in need of medication refills, please call the clinic at [phone].    5. Should you want to get in touch with your provider, LILLIAM Stanford, please contact the office ([phone]), and staff will be able to page Kiersten directly.  6. In the event you have personal crisis, contact the following crisis numbers: Suicide Prevention Hotline 1-288.775.6106; [facility] Helpline 2-839-014-WHHD; [facility] Emergency Room [phone]; text HELLO to 888910; or 027.     SPECIFIC RECOMMENDATIONS:     1.      Medications discussed at this encounter:     New Medications Ordered This Visit   Medications    busPIRone (BUSPAR) 5 MG tablet     Sig: Take 1 tablet by mouth 2 (Two) Times a Day As Needed (Anxiety).     Dispense:  60 tablet     Refill:  1    escitalopram (Lexapro) 10 MG tablet     Sig: Take 1 tablet by mouth Daily.     Dispense:  30 tablet     Refill:  1    traZODone (DESYREL) 50 MG tablet     Sig: Take 1-2 tablets by mouth every night at bedtime.     Dispense:  60 tablet     Refill:  1                       2.      Psychotherapy recommendations: We will continue therapy at future visits.     3.     Return to clinic: Return in about 3 months (around 8/6/2024) for Next scheduled follow up.

## 2024-05-06 NOTE — PROGRESS NOTES
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 Hallucinations    Adhesive Tape Rash     CANNOT USE EKG ADHESIVE STRIPS/RASH & ITCHING    Latex Rash      Discontinued Medications:  Medications Discontinued During This Encounter   Medication Reason    busPIRone (BUSPAR) 5 MG tablet Reorder    escitalopram (Lexapro) 10 MG tablet Reorder    traZODone (DESYREL) 50 MG tablet Reorder       PLAN:   Presentation seems most consistent with DSM-V criteria for:  Diagnoses and all orders for this visit:    1. Severe episode of recurrent major depressive disorder, without psychotic features (Primary)  -     escitalopram (Lexapro) 10 MG tablet; Take 1 tablet by mouth Daily.  Dispense: 30 tablet; Refill: 1    2. Generalized anxiety disorder  -     busPIRone (BUSPAR) 5 MG tablet; Take 1 tablet by mouth 2 (Two) Times a Day As Needed (Anxiety).  Dispense: 60 tablet; Refill: 1  -     escitalopram (Lexapro) 10 MG tablet; Take 1 tablet by mouth Daily.  Dispense: 30 tablet; Refill: 1    3. Post traumatic stress disorder (PTSD)  -     escitalopram (Lexapro) 10 MG tablet; Take 1 tablet by mouth Daily.  Dispense: 30 tablet; Refill: 1    4. Primary insomnia  -     traZODone (DESYREL) 50 MG tablet; Take 1-2 tablets by mouth every night at bedtime.  Dispense: 60 tablet; Refill: 1       Continue Lexapro 10 mg daily  Continue BuSpar 5 mg twice daily as  needed for anxiety  Continue trazodone 50 to 100 mg at bedtime  Follow-up 2 months  Medication Education:   LEXAPRO (ESCITALOPRAM)  Risks, benefits, alternatives discussed with patient including GI upset, nausea vomiting diarrhea, theoretical decrease of seizure threshold predisposing the patient to a slightly higher seizure risk, headaches, sexual dysfunction, serotonin syndrome, bleeding risk.  After discussion of these risks and benefits, the patient voiced understanding and agreed to proceed.  BUSPAR (BUSPIRONE) Risks, benefits, alternatives discussed with patient including nausea, GI upset, mild sedation, falls risk.  After discussion of these risks and benefits, the patient voiced understanding and agreed to proceed.  DESYREL (TRAZODONE) Risks, benefits, side effects discussed with patient including GI upset, sedation, dizziness/falls risk, grogginess the following day, prolongation of the QTc interval.  After discussion of these risks and benefits, the patient voiced understanding and agreed to proceed.   Medications:   New Medications Ordered This Visit   Medications    busPIRone (BUSPAR) 5 MG tablet     Sig: Take 1 tablet by mouth 2 (Two) Times a Day As Needed (Anxiety).     Dispense:  60 tablet     Refill:  1    escitalopram (Lexapro) 10 MG tablet     Sig: Take 1 tablet by mouth Daily.     Dispense:  30 tablet     Refill:  1    traZODone (DESYREL) 50 MG tablet     Sig: Take 1-2 tablets by mouth every night at bedtime.     Dispense:  60 tablet     Refill:  1      VENKATA reviewed.   Discussed medication options and treatment plan of prescribed medication as well as the risks, benefits, and side effects.  Patient verbalized understanding and is agreeable to this plan.   Patient is agreeable to call the office with any worsening of symptoms or onset of side effects.   Patient is agreeable to call 911 or go to the nearest ER should he/she begin having SI/HI.   Continue psychotherapeutic modalities as  indicated.  Continue to challenge patterns of living conducive to pathology, strengthen defenses, promote problems solving, restore adaptive functioning and provide symptom relief.   Patient acknowledged and verbally consented to continue with current treatment plan and was educated on the importance of compliance with treatment and follow-up appointments.  Addressed all questions and concerns.     Psychotherapy:      Psychotherapy time 40 minutes.  This time is exclusive to the therapy session and separate from the time spent on activities used to meet the criteria for the E/M service (history, exam, medical decision-making).  Goal is to strengthen defenses, promote problems solving, restore adaptive functioning, and provide symptom relief. Esteem building was enhanced through praise, reassurance, normalizing and encouragement. Coping skills were enhanced to build distress tolerance skills and emotional regulation. Allowed patient to freely discuss issues without interruption or judgement with unconditional positive regard, active listening skills, and empathy. Provided a safe, confidential environment to facilitate the development of a positive therapeutic relationship and encourage open, honest communication. Assisted patient in processing session content, acknowledged and normalized patient’s thoughts, feelings, and concerns by utilizing a person-centered approach in efforts to build appropriate rapport and a positive therapeutic relationship with open and honest communication. Plan to continue supportive psychotherapy in next appointment to provide symptom relief.    Functional status:Good  Prognosis: Good  Progress: Continued improvement    Safety/Risk Assessment: Risk of self-harm acutely and chronically is low.    Risk factors include anxiety disorder, mood disorder, and recent psychosocial stressors.   Protective factors include no family history, denies access to guns/weapons, no present SI, no history of  suicide attempts or self-harm in the past, minimal AODA, healthcare seeking, future orientation, willingness to engage in care.    Risk assessment could be further elevated in the event of treatment noncompliance and/or AODA.    Follow-up: Return in about 3 months (around 8/6/2024) for Next scheduled follow up.     This document has been electronically signed by LILLIAM Stanford  May 20, 2024 15:33 EDT  Please note that portions of this note were completed with a voice recognition program.  Copied text in this note has been reviewed and is accurate as of 05/20/24

## 2024-05-15 ENCOUNTER — TELEPHONE (OUTPATIENT)
Dept: INTERNAL MEDICINE | Facility: CLINIC | Age: 39
End: 2024-05-15
Payer: COMMERCIAL

## 2024-05-15 ENCOUNTER — OFFICE VISIT (OUTPATIENT)
Dept: INTERNAL MEDICINE | Facility: CLINIC | Age: 39
End: 2024-05-15
Payer: COMMERCIAL

## 2024-05-15 VITALS
HEART RATE: 79 BPM | WEIGHT: 293 LBS | RESPIRATION RATE: 18 BRPM | SYSTOLIC BLOOD PRESSURE: 114 MMHG | HEIGHT: 64 IN | TEMPERATURE: 98.5 F | BODY MASS INDEX: 50.02 KG/M2 | OXYGEN SATURATION: 96 % | DIASTOLIC BLOOD PRESSURE: 88 MMHG

## 2024-05-15 DIAGNOSIS — J40 BRONCHITIS: ICD-10-CM

## 2024-05-15 DIAGNOSIS — R05.1 ACUTE COUGH: Primary | ICD-10-CM

## 2024-05-15 DIAGNOSIS — J45.41 MODERATE PERSISTENT ASTHMA WITH ACUTE EXACERBATION: ICD-10-CM

## 2024-05-15 PROCEDURE — 99213 OFFICE O/P EST LOW 20 MIN: CPT | Performed by: PHYSICIAN ASSISTANT

## 2024-05-15 RX ORDER — PREDNISONE 20 MG/1
40 TABLET ORAL DAILY
Qty: 10 TABLET | Refills: 0 | Status: SHIPPED | OUTPATIENT
Start: 2024-05-15 | End: 2024-05-21

## 2024-05-15 RX ORDER — AZITHROMYCIN 250 MG/1
TABLET, FILM COATED ORAL
Qty: 6 TABLET | Refills: 0 | Status: SHIPPED | OUTPATIENT
Start: 2024-05-15

## 2024-05-15 NOTE — TELEPHONE ENCOUNTER
Patient called office requesting an appt, I asked patient what were her symptoms, patient stated she is having difficulty breathing, wheezing, rattle and sharp pain when breathing, patient states she has asthma and she has been using her inhalers but they don't seem to be working, I advise patient to go to ER, patient decline and stated she rather see her provider prior to go to ER, I have scheduled patient for an appt today with Dina JAMISON.

## 2024-05-15 NOTE — PROGRESS NOTES
Chief Complaint  Cough, Chest Pain, and Shortness of Breath    Subjective          Jessie Romero presents to [facility]  Cough  Associated symptoms include chest pain and shortness of breath.   Chest Pain   Associated symptoms include a cough and shortness of breath.   Shortness of Breath  Associated symptoms include chest pain.     Pt here for cough x 1 month  Pt states 2 wks ago sx started getting worse  She is having pain on L side of chest   Around L breast  Denies pain in L side of back   Pain increased if she takes a big breath in. Sudden movements increase the pain.   Pain increased with coughing. Cough sounds wet  She is coughing up phlegm, yellow and brown   She has been wheezing   She has been using mucinex  Denies fever  Energy is low  Appetite is normal   Using albuterol 2-4x/day       Past Medical History:   Diagnosis Date    Abnormal Pap smear of cervix     Allergic 2000    Moved to [place]    Allergic rhinitis     Anxiety     Asthma     Depression     GERD (gastroesophageal reflux disease)     Headache 2002    Heart murmur     History of heart attack 05/03/2024    Migraine     Myocardial infarction 03/2010    Ovarian cyst     Tear of meniscus of knee         Past Surgical History:   Procedure Laterality Date    ADRENAL GLAND SURGERY Left 01/01/2010    [facility] [place] DR [facility]    HYSTEROSCOPY LEEP BIOPSY  2011        Current Outpatient Medications on File Prior to Visit   Medication Sig Dispense Refill    albuterol sulfate  (90 Base) MCG/ACT inhaler Inhale 2 puffs Every 4 (Four) Hours As Needed for Wheezing. 8.5 g 2    Azelastine HCl 137 MCG/SPRAY solution Instill 1-2 sprays each nostril twice a day as needed for runny nose, sneezing or increased nasal allergy symptoms. 30 mL 11    budesonide-formoterol (Symbicort) 160-4.5 MCG/ACT inhaler Inhale 2 puffs every 12 hours. Use with spacer. Rinse mouth after each use 10.2 g 11    busPIRone  "(BUSPAR) 5 MG tablet Take 1 tablet by mouth 2 (Two) Times a Day As Needed (Anxiety). 60 tablet 1    escitalopram (Lexapro) 10 MG tablet Take 1 tablet by mouth Daily. 30 tablet 1    fexofenadine (Allegra Allergy) 180 MG tablet Take 1 tablet by mouth Daily. 90 tablet 0    fluticasone (FLONASE) 50 MCG/ACT nasal spray instill 2 sprays in each nostril once daily 16 g 2    montelukast (SINGULAIR) 10 MG tablet Take one tablet daily at bedtime 30 tablet 11    pantoprazole (Protonix) 40 MG EC tablet Take 1 tablet by mouth Daily. 30 tablet 1    traZODone (DESYREL) 50 MG tablet Take 1-2 tablets by mouth every night at bedtime. 60 tablet 1     No current facility-administered medications on file prior to visit.        Allergies   Allergen Reactions    Doxycycline Anaphylaxis     Shortness of air    Morphine Hallucinations    Adhesive Tape Rash     CANNOT USE EKG ADHESIVE STRIPS/RASH & ITCHING    Latex Rash       Social History     Tobacco Use   Smoking Status Former    Current packs/day: 0.00    Average packs/day: 2.0 packs/day for 3.0 years (6.0 ttl pk-yrs)    Types: Cigarettes    Start date: 2017    Quit date: 2020    Years since quittin.3    Passive exposure: Past   Smokeless Tobacco Never          Objective   Vital Signs:   /88 (BP Location: Left arm, Patient Position: Sitting, Cuff Size: Large Adult)   Pulse 79   Temp 98.5 °F (36.9 °C) (Temporal)   Resp 18   Ht 162.6 cm (64\")   Wt 136 kg (299 lb)   SpO2 96%   BMI 51.32 kg/m²     Physical Exam  Vitals reviewed.   Constitutional:       Appearance: Normal appearance.   HENT:      Head: Normocephalic and atraumatic.      Nose: Nose normal.      Mouth/Throat:      Mouth: Mucous membranes are moist.   Eyes:      Extraocular Movements: Extraocular movements intact.      Conjunctiva/sclera: Conjunctivae normal.      Pupils: Pupils are equal, round, and reactive to light.   Cardiovascular:      Rate and Rhythm: Normal rate and regular rhythm.   Pulmonary: "      Effort: Pulmonary effort is normal. No respiratory distress.      Breath sounds: Wheezing and rhonchi present.   Abdominal:      General: Abdomen is flat. Bowel sounds are normal.      Palpations: Abdomen is soft.   Musculoskeletal:         General: Normal range of motion.   Neurological:      General: No focal deficit present.      Mental Status: She is alert and oriented to person, place, and time.   Psychiatric:         Mood and Affect: Mood normal.        Result Review :   The following data was reviewed by: Dina Clemente PA-C on 05/15/2024:    Data reviewed : Radiologic studies cxr                  Assessment and Plan    Diagnoses and all orders for this visit:    1. Acute cough (Primary)  -     XR Chest PA & Lateral (In Office)    2. Bronchitis    3. Moderate persistent asthma with acute exacerbation    Other orders  -     predniSONE (DELTASONE) 20 MG tablet; Take 2 tablets by mouth Daily for 5 days.  Dispense: 10 tablet; Refill: 0  -     azithromycin (Zithromax Z-Rolly) 250 MG tablet; Take 2 tablets by mouth on day 1, then 1 tablet daily on days 2-5  Dispense: 6 tablet; Refill: 0        Follow Up   Return in about 1 week (around 5/22/2024), or if symptoms worsen or fail to improve.  Patient was given instructions and counseling regarding her condition or for health maintenance advice. Please see specific information pulled into the AVS if appropriate.

## 2024-05-22 ENCOUNTER — OFFICE VISIT (OUTPATIENT)
Dept: INTERNAL MEDICINE | Facility: CLINIC | Age: 39
End: 2024-05-22
Payer: COMMERCIAL

## 2024-05-22 VITALS
WEIGHT: 293 LBS | BODY MASS INDEX: 51.87 KG/M2 | OXYGEN SATURATION: 98 % | DIASTOLIC BLOOD PRESSURE: 78 MMHG | HEART RATE: 71 BPM | SYSTOLIC BLOOD PRESSURE: 122 MMHG | TEMPERATURE: 98.2 F

## 2024-05-22 DIAGNOSIS — J45.41 MODERATE PERSISTENT ASTHMA WITH ACUTE EXACERBATION: ICD-10-CM

## 2024-05-22 DIAGNOSIS — J40 BRONCHITIS: Primary | ICD-10-CM

## 2024-05-22 PROCEDURE — 99213 OFFICE O/P EST LOW 20 MIN: CPT | Performed by: PHYSICIAN ASSISTANT

## 2024-05-22 NOTE — PROGRESS NOTES
Chief Complaint  Follow-up (1 wk from sick visit, still with cough, headache, drainage, and hoarseness )    Subjective          Jessie Romero presents to [facility]  History of Present Illness  Still having sinus pressure, sore/dry throat, clear nasal drainage  Coughing up green phlegm   Wheezing improved   Denies resp distress, sob   Denies fever   Has not needed albuterol today   She increased use last wk with illness but that has improved  Denies chest pain, dizziness    Past Medical History:   Diagnosis Date    Abnormal Pap smear of cervix     Allergic 2000    Moved to [place]    Allergic rhinitis     Anxiety     Asthma     Depression     GERD (gastroesophageal reflux disease)     Headache 2002    Heart murmur     History of heart attack 05/03/2024    Migraine     Myocardial infarction 03/2010    Ovarian cyst     Tear of meniscus of knee         Past Surgical History:   Procedure Laterality Date    ADRENAL GLAND SURGERY Left 01/01/2010    [facility] [place] DR [facility]    HYSTEROSCOPY LEEP BIOPSY  2011        Current Outpatient Medications on File Prior to Visit   Medication Sig Dispense Refill    albuterol sulfate  (90 Base) MCG/ACT inhaler Inhale 2 puffs Every 4 (Four) Hours As Needed for Wheezing. 8.5 g 2    Azelastine HCl 137 MCG/SPRAY solution Instill 1-2 sprays each nostril twice a day as needed for runny nose, sneezing or increased nasal allergy symptoms. 30 mL 11    budesonide-formoterol (Symbicort) 160-4.5 MCG/ACT inhaler Inhale 2 puffs every 12 hours. Use with spacer. Rinse mouth after each use 10.2 g 11    busPIRone (BUSPAR) 5 MG tablet Take 1 tablet by mouth 2 (Two) Times a Day As Needed (Anxiety). 60 tablet 1    escitalopram (Lexapro) 10 MG tablet Take 1 tablet by mouth Daily. 30 tablet 1    fexofenadine (Allegra Allergy) 180 MG tablet Take 1 tablet by mouth Daily. 90 tablet 0    fluticasone (FLONASE) 50 MCG/ACT nasal spray instill 2  sprays in each nostril once daily 16 g 2    montelukast (SINGULAIR) 10 MG tablet Take one tablet daily at bedtime 30 tablet 11    pantoprazole (Protonix) 40 MG EC tablet Take 1 tablet by mouth Daily. 30 tablet 1    traZODone (DESYREL) 50 MG tablet Take 1-2 tablets by mouth every night at bedtime. 60 tablet 1     No current facility-administered medications on file prior to visit.        Allergies   Allergen Reactions    Doxycycline Anaphylaxis     Shortness of air    Morphine Hallucinations    Adhesive Tape Rash     CANNOT USE EKG ADHESIVE STRIPS/RASH & ITCHING    Latex Rash       Social History     Tobacco Use   Smoking Status Former    Current packs/day: 0.00    Average packs/day: 2.0 packs/day for 3.0 years (6.0 ttl pk-yrs)    Types: Cigarettes    Start date: 2017    Quit date: 2020    Years since quittin.3    Passive exposure: Past   Smokeless Tobacco Never          Objective   Vital Signs:   /78 (BP Location: Left arm, Patient Position: Sitting, Cuff Size: Large Adult)   Pulse 71   Temp 98.2 °F (36.8 °C) (Temporal)   Wt (!) 137 kg (302 lb 3.2 oz)   SpO2 98%   BMI 51.87 kg/m²     Physical Exam  Vitals reviewed.   Constitutional:       Appearance: Normal appearance.   HENT:      Head: Normocephalic and atraumatic.      Nose: Nose normal.      Mouth/Throat:      Mouth: Mucous membranes are moist.   Eyes:      Extraocular Movements: Extraocular movements intact.      Conjunctiva/sclera: Conjunctivae normal.      Pupils: Pupils are equal, round, and reactive to light.   Cardiovascular:      Rate and Rhythm: Normal rate and regular rhythm.   Pulmonary:      Effort: Pulmonary effort is normal.      Breath sounds: Normal breath sounds.   Abdominal:      General: Abdomen is flat. Bowel sounds are normal.      Palpations: Abdomen is soft.   Musculoskeletal:         General: Normal range of motion.   Neurological:      General: No focal deficit present.      Mental Status: She is alert and oriented  to person, place, and time.   Psychiatric:         Mood and Affect: Mood normal.        Result Review :   The following data was reviewed by: Dina Clemente PA-C on 05/22/2024:    Data reviewed : Radiologic studies cxr                      Assessment and Plan    Diagnoses and all orders for this visit:    1. Bronchitis (Primary)    2. Moderate persistent asthma with acute exacerbation    Wheezing improved on exam. O2 wnl. No need for further abx at this time. Cont allergy meds and use of albuterol prn. Pt will let us know if sx not resolved in 1 wk or if sx worsen/change.     Follow Up   Return if symptoms worsen or fail to improve.  Patient was given instructions and counseling regarding her condition or for health maintenance advice. Please see specific information pulled into the AVS if appropriate.

## 2024-05-23 ENCOUNTER — CLINICAL SUPPORT (OUTPATIENT)
Dept: BARIATRICS/WEIGHT MGMT | Facility: CLINIC | Age: 39
End: 2024-05-23
Payer: COMMERCIAL

## 2024-05-30 ENCOUNTER — LAB (OUTPATIENT)
Dept: LAB | Facility: HOSPITAL | Age: 39
End: 2024-05-30
Payer: COMMERCIAL

## 2024-05-30 DIAGNOSIS — Z00.00 ANNUAL PHYSICAL EXAM: ICD-10-CM

## 2024-05-30 LAB
ALBUMIN SERPL-MCNC: 4.2 G/DL (ref 3.5–5.2)
ALBUMIN/GLOB SERPL: 1.6 G/DL
ALP SERPL-CCNC: 97 U/L (ref 39–117)
ALT SERPL W P-5'-P-CCNC: 14 U/L (ref 1–33)
ANION GAP SERPL CALCULATED.3IONS-SCNC: 11.6 MMOL/L (ref 5–15)
AST SERPL-CCNC: 14 U/L (ref 1–32)
BASOPHILS # BLD AUTO: 0.05 10*3/MM3 (ref 0–0.2)
BASOPHILS NFR BLD AUTO: 0.5 % (ref 0–1.5)
BILIRUB SERPL-MCNC: <0.2 MG/DL (ref 0–1.2)
BUN SERPL-MCNC: 17 MG/DL (ref 6–20)
BUN/CREAT SERPL: 21.3 (ref 7–25)
CALCIUM SPEC-SCNC: 9.1 MG/DL (ref 8.6–10.5)
CHLORIDE SERPL-SCNC: 103 MMOL/L (ref 98–107)
CHOLEST SERPL-MCNC: 203 MG/DL (ref 0–200)
CO2 SERPL-SCNC: 24.4 MMOL/L (ref 22–29)
CREAT SERPL-MCNC: 0.8 MG/DL (ref 0.57–1)
DEPRECATED RDW RBC AUTO: 41.3 FL (ref 37–54)
EGFRCR SERPLBLD CKD-EPI 2021: 97.5 ML/MIN/1.73
EOSINOPHIL # BLD AUTO: 0.3 10*3/MM3 (ref 0–0.4)
EOSINOPHIL NFR BLD AUTO: 2.8 % (ref 0.3–6.2)
ERYTHROCYTE [DISTWIDTH] IN BLOOD BY AUTOMATED COUNT: 14.5 % (ref 12.3–15.4)
GLOBULIN UR ELPH-MCNC: 2.6 GM/DL
GLUCOSE SERPL-MCNC: 78 MG/DL (ref 65–99)
HBA1C MFR BLD: 5.7 % (ref 4.8–5.6)
HCT VFR BLD AUTO: 35.3 % (ref 34–46.6)
HDLC SERPL-MCNC: 50 MG/DL (ref 40–60)
HGB BLD-MCNC: 11 G/DL (ref 12–15.9)
IMM GRANULOCYTES # BLD AUTO: 0.04 10*3/MM3 (ref 0–0.05)
IMM GRANULOCYTES NFR BLD AUTO: 0.4 % (ref 0–0.5)
LDLC SERPL CALC-MCNC: 132 MG/DL (ref 0–100)
LDLC/HDLC SERPL: 2.58 {RATIO}
LYMPHOCYTES # BLD AUTO: 2.83 10*3/MM3 (ref 0.7–3.1)
LYMPHOCYTES NFR BLD AUTO: 26.9 % (ref 19.6–45.3)
MCH RBC QN AUTO: 24.6 PG (ref 26.6–33)
MCHC RBC AUTO-ENTMCNC: 31.2 G/DL (ref 31.5–35.7)
MCV RBC AUTO: 78.8 FL (ref 79–97)
MONOCYTES # BLD AUTO: 0.94 10*3/MM3 (ref 0.1–0.9)
MONOCYTES NFR BLD AUTO: 8.9 % (ref 5–12)
NEUTROPHILS NFR BLD AUTO: 6.38 10*3/MM3 (ref 1.7–7)
NEUTROPHILS NFR BLD AUTO: 60.5 % (ref 42.7–76)
NRBC BLD AUTO-RTO: 0 /100 WBC (ref 0–0.2)
PLATELET # BLD AUTO: 377 10*3/MM3 (ref 140–450)
PMV BLD AUTO: 10.4 FL (ref 6–12)
POTASSIUM SERPL-SCNC: 4.1 MMOL/L (ref 3.5–5.2)
PROT SERPL-MCNC: 6.8 G/DL (ref 6–8.5)
RBC # BLD AUTO: 4.48 10*6/MM3 (ref 3.77–5.28)
SODIUM SERPL-SCNC: 139 MMOL/L (ref 136–145)
TRIGL SERPL-MCNC: 119 MG/DL (ref 0–150)
TSH SERPL DL<=0.05 MIU/L-ACNC: 1.19 UIU/ML (ref 0.27–4.2)
VLDLC SERPL-MCNC: 21 MG/DL (ref 5–40)
WBC NRBC COR # BLD AUTO: 10.54 10*3/MM3 (ref 3.4–10.8)

## 2024-05-30 PROCEDURE — 83036 HEMOGLOBIN GLYCOSYLATED A1C: CPT

## 2024-05-30 PROCEDURE — 36415 COLL VENOUS BLD VENIPUNCTURE: CPT

## 2024-05-30 PROCEDURE — 80050 GENERAL HEALTH PANEL: CPT

## 2024-05-30 PROCEDURE — 80061 LIPID PANEL: CPT

## 2024-05-30 NOTE — PROGRESS NOTES
"MEDICAL WEIGHT LOSS NUTRITION FOLLOW UP    Patient Name: Jessie Romero    YOB: 1985   Age: 38 y.o.  Sex: female  MRN: [id number]     ASSESSMENT    Anthropometric Measurements  Estimated body mass index is 51.87 kg/m² as calculated from the following:    Height as of 5/15/24: 162.6 cm (64\").    Weight as of 5/22/24: 137 kg (302 lb 3.2 oz).    Patient Goals and Expectations                        Patient's stated goal: lose 100 lbs    Medical History  Pertinent diagnosis/problems: GERD, depression, anxiety  Past Medical History:   Diagnosis Date    Abnormal Pap smear of cervix     Allergic 2000    Moved to [place]    Allergic rhinitis     Anxiety     Asthma     Depression     GERD (gastroesophageal reflux disease)     Headache 2002    Heart murmur     History of heart attack 05/03/2024    Migraine     Myocardial infarction 03/2010    Ovarian cyst     Tear of meniscus of knee      Past Surgical History:   Procedure Laterality Date    ADRENAL GLAND SURGERY Left 01/01/2010    [facility] [place] DR [facility]    HYSTEROSCOPY LEEP BIOPSY  2011       Visit Narrative  Jessie Romero is participating in medical weight loss program under the supervision of a medical specialist and registered dietitian. Spoke with patient today by telephone for nutrition follow-up. She has attended initial consultation and group nutrition education.  The patient states they are making efforts to follow the recommendations given at intake appointment including high lean protein, low carbohydrate and low fat diet. They are working on increasing fruits and vegetable intake, decreasing portion sizes of higher calorie foods and drinking 64 oz water daily. Patient is working on reducing intake of fast foods, fried foods, sweets and high calorie beverages.    Patient states they have made positive changes including eating protein first at meals and drinking more water. Has cut back sodas to about 2 per week. She reports she " is feeling amazing. She understands more clearly how to eat well and has already noticed reduction in cravings and increased energy. Weight loss of 3-4 lbs on home scale.      DIAGNOSIS     Nutrition problem statement: Overweight/obesity related to multifactorial biochemical, behavioral and environmental contributors to disease as evidenced by BMI 51.87 kg/m².    INTERVENTION    Positive feedback provided for progress made. Discussed personal habits and lifestyle behaviors that may influence weight loss efforts. Self-monitoring strategies such as keeping a food journal were encouraged. The patient was advised to continue high protein, low fat and low carbohydrate diet and routine exercise including but not limited to 150 minutes per week. Encouraged her to continue current efforts/goals at this time since things are going well. Patient verbalized understanding and questions were answered.    MONITORING & EVALUATION    Short term goals:   Follow plate method for balanced meals and prioritize protein with all meals and snacks  Aim for 64 oz water and limit sodas to 1 per day        Electronically signed by:  Rama Blair RD   05/30/24 09:22 EDT

## 2024-06-03 ENCOUNTER — OFFICE VISIT (OUTPATIENT)
Dept: SLEEP MEDICINE | Facility: HOSPITAL | Age: 39
End: 2024-06-03
Payer: COMMERCIAL

## 2024-06-03 VITALS
OXYGEN SATURATION: 96 % | WEIGHT: 293 LBS | HEART RATE: 66 BPM | BODY MASS INDEX: 50.02 KG/M2 | DIASTOLIC BLOOD PRESSURE: 79 MMHG | SYSTOLIC BLOOD PRESSURE: 130 MMHG | HEIGHT: 64 IN

## 2024-06-03 DIAGNOSIS — F32.A DEPRESSION, UNSPECIFIED DEPRESSION TYPE: ICD-10-CM

## 2024-06-03 DIAGNOSIS — Z72.821 INADEQUATE SLEEP HYGIENE: ICD-10-CM

## 2024-06-03 DIAGNOSIS — E66.01 CLASS 3 SEVERE OBESITY WITH BODY MASS INDEX (BMI) OF 50.0 TO 59.9 IN ADULT, UNSPECIFIED OBESITY TYPE, UNSPECIFIED WHETHER SERIOUS COMORBIDITY PRESENT: ICD-10-CM

## 2024-06-03 DIAGNOSIS — G47.19 EXCESSIVE DAYTIME SLEEPINESS: ICD-10-CM

## 2024-06-03 DIAGNOSIS — R06.81 WITNESSED EPISODE OF APNEA: ICD-10-CM

## 2024-06-03 DIAGNOSIS — R29.818 SUSPECTED SLEEP APNEA: Primary | ICD-10-CM

## 2024-06-03 DIAGNOSIS — F41.9 ANXIETY: ICD-10-CM

## 2024-06-03 DIAGNOSIS — R06.83 SNORING: ICD-10-CM

## 2024-06-03 PROCEDURE — 99204 OFFICE O/P NEW MOD 45 MIN: CPT | Performed by: FAMILY MEDICINE

## 2024-06-03 PROCEDURE — G0463 HOSPITAL OUTPT CLINIC VISIT: HCPCS

## 2024-06-03 NOTE — PROGRESS NOTES
[facility] [address] [address]  [place]   [place] [address]  Phone: [phone]  Fax: [phone]      Jessie Romero  [id number]   1985  38 y.o.  female      Referring physician/provider bariatrics Hanna Martinez MD    Type of service: Initial Sleep Medicine Consult.  Date of service: 6/3/2024      Chief Complaint   Patient presents with    Snoring    Witnessed Apnea       History of present illness;  The patient was seen today on 6/3/2024 at [facility].    Thank you for asking to see Jessie Romero, 38 y.o. PMHx anxiety, depression, CAD (s/p NSTEMI - in 2010 medical management), GERD.  The patient presents for initial evaluation of sleep sleep disordered breathing.  Patient  denies prior surgery namely tonsillectomy, nasal surgery or UPPP.     Loud snoring and witnessed apnea by her mother and grandmother  States all of her family members snores   Patient never had a sleep study     Obstructive Sleep Apnea Screening: STOP-BANG Sleep Apnea Questionnaire. Reference: Kristopher THOMPSON et al. Br J Anaesth, 2012.     Criterion    Yes    No  Do you SNORE loudly?   [x]   Yes  []   No   Do you often feel TIRED, fatigued, or sleepy during the day?    [x]   Yes  []   No  Has anyone OBSERVED you stop breathing during your sleep?    [x]   Yes  []   No  Do you have or are you being treated for high blood PRESSURE?    []   Yes  [x]   No  BMI >32 kg/m2     [x]   Yes  []   No  AGE > 50 years    [x]   Yes  []   No  NECK circumference >16 inches / 40 cm    []   Yes  [x]   No  GENDER: male     []   Yes  [x]   No    SANDY Probability:  []   1-2 - Low  []   3-4 - Intermediate  [x]   5-8 - High      Asthma albuterol inhaler 1-2x in the past 2 weeks   Denies any past cardiopulmonary conditions/neurologic disorders/neuromuscular disorders  Never needed supplemental O2 at home  Denies any opioid therapy  Denies any metal in head/neck/chest      Further  Sleep History:    Bedtime: Weekdays 7:30 PM; weekends 8:30 PM-9 PM  Rise Time: Days 4 AM; weekends 5 AM-6 a.m.  Sleep Latency: 20 minutes - 60 minutes+  Screens in bed: Yes  Wake after sleep onset: 3-4 times  Reasons for awakenings: Nocturia or apneic events  Number of naps per day at least 1/day  Naps restorative: Denies  Caffeine use: 3 or more beverages per day    RLS Symptoms: No   Bruxism:No   Current sleep related gastroesophageal reflux symptoms:  No   Cataplexy:  No   Sleep Paralysis:  No   Hypnagogic or hypnopompic hallucinations: No   Parasomnias such as sleep walking or sleep eating No     Disclaimer Sleep History: The above sleep history is based on this sleep physician's in room encounter with the patient. Pre encounter self administered questionnaires are taken into consideration and discussed with patient for any discordance. The above documentation by this sleep physician is the most accurate clinical information determined by in room sleep physician encounter with patient.     MEDICAL CONDITIONS (PMH)   Anxiety  Depression  CAD (s/p MI 2010)  Asthma  Migraines  Morbid obesity  Seasonal allergies    Social history:  Do you drive a commercial vehicle:  No   Shift work:  No   Tobacco use:  No former 2 PPD cigarette smoker since age 19 quit 4 years ago  Alcohol use: ~1 per week  Occupation:  Supplies at the [employer]   Illicit substances: Marijuana past use states she has not used in a long long time    Family Hx (parents and siblings) (pertaining to sleep medicine)  Snoring - states all of her family members are loud snorers   Unspecified thyroid disorder  Obesity      Medications: reviewed    Review of systems is negative unless otherwise noted per HPI   Disclaimer History: The above history is based on this sleep physician's in room encounter with the patient. Pre encounter self administered questionnaires are taken into consideration and discussed with patient for any discordance. The above  "documentation by this sleep physician is the most accurate clinical information determined by in room sleep physician encounter with patient.     Physical exam:  Vitals:    06/03/24 0900   BP: 130/79   Pulse: 66   SpO2: 96%   Weight: 136 kg (298 lb 14.4 oz)   Height: 162.6 cm (64\")    Body mass index is 51.31 kg/m².   CONSTITUTIONAL:  Non-toxic, In no overt distress   Head: normocephalic   ENT: Mallampati class IV, + macroglossia, no septal defects, she has a small left lower lip ring (states she can take out no issue)  NECK:Neck Circumference: 15.5 inches,no nuchal rigidity  RESPIRATORY SYSTEM: Breath sounds are clear (no rales, no rhonchi, no wheezes), no accessory muscle use  CARDIOVASULAR SYSTEM: Heart sounds are regular rhythm and normal rate, no rub, no gallop, no edema  NEUROLOGICAL SYSTEM: Oriented x 3, No gross focal deficits   PSYCHIATRIC SYSTEM: Goal oriented, affect full range appropriate      Office notes from care team reviewed:    -5/6/2024 office visit bariatrics LILLIAM Seth seen for bariatric surgery evaluation    Labs reviewed.  TSH          11/18/2023    18:23 5/30/2024    14:51   TSH   TSH 2.200  1.190       Most Recent A1C          5/30/2024    14:51   HGBA1C Most Recent   Hemoglobin A1C 5.70      -5/30/2024  Bicarb 24.4    Imaging/Diagnostics reviewed:     -2/21/2023 echocardiogram [facility] cardiologist's echocardiogram findings see full report for further details:Left Ventricle Left ventricular systolic function is normal. Estimated left ventricular EF = 55%    Assessment and plan:  Suspected sleep apnea [R29.818] patient's symptoms and examination is consistent with sleep apnea (G47.30). I have talked to the patient about the signs and symptoms of sleep apnea. In addition, I have also discussed pathophysiology of sleep apnea.  I also discussed the complications of untreated sleep apnea including effects on hypertension, diabetes mellitus and nonrestorative sleep with hypersomnia which " can increase risk for motor vehicle accidents.  Untreated sleep apnea is also a risk factor for development of atrial fibrillation, hypertension, insulin resistance and cerebrovascular accident.  Discussed in detail of various testing methods including home-based and lab based sleep studies.  Based on history and physical examination and other comorbidities the most appropriate study is Home Sleep Study.  The order for the sleep study is placed in [facility].  The test will be scheduled after approval from insurance. Treatment and management will be discussed after the test is completed. High pretest probability STOP-BANG 5/8, macroglossia, Mallampati is IV.  Home sleep study may rule in sleep apnea.  However, under patient's specific clinical circumstances home sleep study may not rule out sleep apnea.  If home sleep testing is negative must proceed with in laboratory polysomnography to definitively rule out sleep apnea (the prior was discussed with patient). Patient was given opportunity to ask questions and all the questions were answered.   -Counseled aspiration risk with left lower lip ring if she needs PAP therapy in the future and decides to use Full Face Mas - patient stated she would have no issue taking this ring out if same is necessary in the future.  Snoring (R06.83), snoring is the sound created by turbulent airflow vibrating upper airway soft tissue due to limitation of inspiratory airflow. I have also discussed factors affecting snoring including sleep deprivation, sleeping on the back and alcohol ingestion. To minimize snoring, patient is advised to have adequate sleep, sleep on the side and avoid alcohol and sedative medications before bedtime  Excessive daytime sleepiness .  Patient endorses subjective excessive daytime sleepiness with sleep physician encounter which was consistent with patient's pre-encounter self-administered Bristol Sleepiness Scale of Total score: 20.  There are many causes for  daytime excessive sleepiness including depression, shiftwork syndrome, and other medical disorders including heart, kidney and liver failure.  From sleep disorders perspective this is sleep disordered breathing until proven otherwise. The most common cause of excessive sleepiness is due to sleep apnea with frequent awakenings during sleep time.  I have discussed safety of driving and to remain vigilant while driving; patient verbalized understanding of counseling.  Obesity, patient's BMI is Body mass index is 51.31 kg/m².. I have discussed the relationship between weight and sleep apnea.There is direct correlation between weight and severity of sleep apnea.  Weight reduction is encouraged, as it is going to reduce the severity of sleep apnea. I have also discussed with the patient diet and exercise to achieve ideal body weight.  Inadequate sleep hygiene [Z72.821]  Counseled the patient lifestyle modifications as below to be applied especially night of any sleep study, the patient verbalized understanding of same. Follow up with PCP to reinforce my counseling towards healthy lifestyle modifications if sleep studies are negative.  Anxiety/Depression,  Follow up with primary care physician for continued management. Comorbid mood disorders would make the patient eligible for a trial of PAP therapy even if sleep study reveals mild severity sleep apnea.      I have also discussed with the patient the following  Sleep hygiene: Maintaining a regular bedtime and wake time, not to watch television or work in bed, limit caffeine-containing beverages before bed time and avoid naps during the day  Adequate amount of sleep.  Generally most people needs about 7 to 8 hours of sleep.      Return for 31 to 90 days after PAP setup with down load.  Patient's questions were answered      I once again thank you for asking me to see this patient in consultation and I have forwarded my opinion and treatment plan.  Please do not hesitate to  call me if you have any questions.       EMR Dragon/Transcription disclaimer:   Much of this encounter note is an electronic transcription/translation of spoken language to printed text. The electronic translation of spoken language may permit erroneous, or at times, nonsensical words or phrases to be inadvertently transcribed; Although I have reviewed the note for such errors, some may still exist.     NPI #: [id number]    Karime Dozier, DO  Sleep Medicine  [facility]  06/03/24

## 2024-06-05 ENCOUNTER — OFFICE VISIT (OUTPATIENT)
Dept: INTERNAL MEDICINE | Facility: CLINIC | Age: 39
End: 2024-06-05
Payer: COMMERCIAL

## 2024-06-05 VITALS
HEIGHT: 64 IN | OXYGEN SATURATION: 94 % | TEMPERATURE: 97.4 F | SYSTOLIC BLOOD PRESSURE: 122 MMHG | WEIGHT: 293 LBS | DIASTOLIC BLOOD PRESSURE: 76 MMHG | HEART RATE: 80 BPM | BODY MASS INDEX: 50.02 KG/M2

## 2024-06-05 DIAGNOSIS — R12 HEARTBURN: Primary | ICD-10-CM

## 2024-06-05 DIAGNOSIS — R42 DIZZINESS: ICD-10-CM

## 2024-06-05 DIAGNOSIS — G43.109 MIGRAINE WITH AURA AND WITHOUT STATUS MIGRAINOSUS, NOT INTRACTABLE: ICD-10-CM

## 2024-06-05 PROCEDURE — 99213 OFFICE O/P EST LOW 20 MIN: CPT | Performed by: INTERNAL MEDICINE

## 2024-06-05 RX ORDER — PANTOPRAZOLE SODIUM 40 MG/1
40 TABLET, DELAYED RELEASE ORAL DAILY
Qty: 90 TABLET | Refills: 1 | Status: SHIPPED | OUTPATIENT
Start: 2024-06-05

## 2024-06-05 RX ORDER — IBUPROFEN 800 MG/1
800 TABLET ORAL EVERY 6 HOURS PRN
Qty: 90 TABLET | Refills: 0 | Status: SHIPPED | OUTPATIENT
Start: 2024-06-05

## 2024-06-05 RX ORDER — FEXOFENADINE HCL 180 MG/1
180 TABLET ORAL DAILY
Qty: 90 TABLET | Refills: 0 | Status: SHIPPED | OUTPATIENT
Start: 2024-06-05

## 2024-06-05 NOTE — PROGRESS NOTES
"Chief Complaint  Follow-up (3 month follow up, last few days has felt dizzy at times, unsure if its from her anemia, usually happens after she bends over and stands back up for about a week that she has noticed. ), Back Pain, and Migraine (Wants to change nurtec to 800 mg ibuprofen )    Subjective      Jessie Romero is a 38 y.o. female who presents to [facility]     Presenting for follow up    Migraine: wanting to have prescription for IBU. The Imitrex prescribed at last visit caused nausea. Nurtec was not effective.     Having dizziness with standing or bending over for the past week. Mostly occurred while she was gardening.         Objective   Vital Signs:   Vitals:    06/05/24 1457   BP: 122/76   BP Location: Right arm   Patient Position: Sitting   Cuff Size: Adult   Pulse: 80   Temp: 97.4 °F (36.3 °C)   TempSrc: Temporal   SpO2: 94%   Weight: (!) 140 kg (309 lb 4 oz)   Height: 162.6 cm (64\")     Body mass index is 53.08 kg/m².    Wt Readings from Last 3 Encounters:   06/05/24 (!) 140 kg (309 lb 4 oz)   06/03/24 136 kg (298 lb 14.4 oz)   05/22/24 (!) 137 kg (302 lb 3.2 oz)     BP Readings from Last 3 Encounters:   06/05/24 122/76   06/03/24 130/79   05/22/24 122/78       Health Maintenance   Topic Date Due    Pneumococcal Vaccine 0-64 (1 of 2 - PCV) Never done    TDAP/TD VACCINES (2 - Tdap) 09/16/2012    HEPATITIS C SCREENING  Never done    ANNUAL PHYSICAL  Never done    COVID-19 Vaccine (3 - 2023-24 season) 09/01/2023    INFLUENZA VACCINE  08/01/2024    BMI FOLLOWUP  05/06/2025    LIPID PANEL  05/30/2025    PAP SMEAR  04/29/2027       Physical Exam  Vitals reviewed.   Constitutional:       Appearance: Normal appearance. She is well-developed.   HENT:      Head: Normocephalic and atraumatic.      Mouth/Throat:      Pharynx: No oropharyngeal exudate.   Eyes:      Conjunctiva/sclera: Conjunctivae normal.      Pupils: Pupils are equal, round, and reactive to " light.   Neck:      Thyroid: No thyromegaly or thyroid tenderness.   Cardiovascular:      Rate and Rhythm: Normal rate and regular rhythm.      Heart sounds: No murmur heard.     No friction rub. No gallop.   Pulmonary:      Effort: Pulmonary effort is normal.      Breath sounds: Normal breath sounds. No wheezing or rhonchi.   Lymphadenopathy:      Cervical: No cervical adenopathy.   Skin:     General: Skin is warm and dry.   Neurological:      Mental Status: She is alert and oriented to person, place, and time.   Psychiatric:         Mood and Affect: Affect normal.          Result Review :  The following data was reviewed by: Hanna Pereira MD on 06/05/2024:  CMP          11/15/2023    15:51 11/18/2023    18:23 5/30/2024    14:51   CMP   Glucose 82  137  78    BUN 18  13  17    Creatinine 0.77  0.72  0.80    EGFR 102.0  110.6  97.5    Sodium 141  137  139    Potassium 3.8  3.5  4.1    Chloride 105  102  103    Calcium 9.3  8.9  9.1    Total Protein 7.2  6.9  6.8    Albumin 4.5  4.3  4.2    Globulin 2.7  2.6  2.6    Total Bilirubin <0.2  0.2  <0.2    Alkaline Phosphatase 82  83  97    AST (SGOT) 15  13  14    ALT (SGPT) 8  10  14    Albumin/Globulin Ratio 1.7  1.7  1.6    BUN/Creatinine Ratio 23.4  18.1  21.3    Anion Gap 10.3  9.0  11.6      CBC          11/15/2023    15:51 11/18/2023    18:23 5/30/2024    14:51   CBC   WBC 9.27  9.18  10.54    RBC 4.59  4.47  4.48    Hemoglobin 12.7  11.8  11.0    Hematocrit 38.6  38.0  35.3    MCV 84.1  85.0  78.8    MCH 27.7  26.4  24.6    MCHC 32.9  31.1  31.2    RDW 13.7  14.6  14.5    Platelets 410  327  377      Lipid Panel          5/30/2024    14:51   Lipid Panel   Total Cholesterol 203    Triglycerides 119    HDL Cholesterol 50    VLDL Cholesterol 21    LDL Cholesterol  132    LDL/HDL Ratio 2.58      TSH          11/18/2023    18:23 5/30/2024    14:51   TSH   TSH 2.200  1.190      Most Recent A1C          5/30/2024    14:51   HGBA1C Most Recent   Hemoglobin A1C  5.70           Procedures          Assessment & Plan  Heartburn  Doing well on PPI  Migraine with aura and without status migrainosus, not intractable  Did not tolerate Imitrex. Will prescribe IBU  Dizziness  Vasovagal. Discussed slow position changes, increasing fluid intake     New Medications Ordered This Visit   Medications    pantoprazole (Protonix) 40 MG EC tablet     Sig: Take 1 tablet by mouth Daily.     Dispense:  90 tablet     Refill:  1    ibuprofen (ADVIL,MOTRIN) 800 MG tablet     Sig: Take 1 tablet by mouth Every 6 (Six) Hours As Needed for Mild Pain.     Dispense:  90 tablet     Refill:  0                    FOLLOW UP  Return in about 6 months (around 12/5/2024) for Next scheduled follow up.  Patient was given instructions and counseling regarding her condition or for health maintenance advice. Please see specific information pulled into the AVS if appropriate.       Hanna Pereira MD  06/05/24  15:50 EDT    CURRENT & DISCONTINUED MEDICATIONS  Current Outpatient Medications   Medication Instructions    albuterol sulfate  (90 Base) MCG/ACT inhaler 2 puffs, Inhalation, Every 4 Hours PRN    Azelastine HCl 137 MCG/SPRAY solution Instill 1-2 sprays each nostril twice a day as needed for runny nose, sneezing or increased nasal allergy symptoms.    budesonide-formoterol (Symbicort) 160-4.5 MCG/ACT inhaler Inhale 2 puffs every 12 hours. Use with spacer. Rinse mouth after each use    busPIRone (BUSPAR) 5 mg, Oral, 2 Times Daily PRN    escitalopram (LEXAPRO) 10 mg, Oral, Daily    fexofenadine (ALLEGRA ALLERGY) 180 mg, Oral, Daily    fluticasone (FLONASE) 50 MCG/ACT nasal spray instill 2 sprays in each nostril once daily    ibuprofen (ADVIL,MOTRIN) 800 mg, Oral, Every 6 Hours PRN    montelukast (SINGULAIR) 10 MG tablet Take one tablet daily at bedtime    pantoprazole (PROTONIX) 40 mg, Oral, Daily    traZODone (DESYREL)  mg, Oral, Every Night at Bedtime       Medications Discontinued During  This Encounter   Medication Reason    pantoprazole (Protonix) 40 MG EC tablet Reorder

## 2024-06-06 ENCOUNTER — OFFICE VISIT (OUTPATIENT)
Dept: BARIATRICS/WEIGHT MGMT | Facility: CLINIC | Age: 39
End: 2024-06-06
Payer: COMMERCIAL

## 2024-06-06 VITALS
HEIGHT: 65 IN | WEIGHT: 293 LBS | DIASTOLIC BLOOD PRESSURE: 88 MMHG | SYSTOLIC BLOOD PRESSURE: 130 MMHG | HEART RATE: 80 BPM | BODY MASS INDEX: 48.82 KG/M2 | TEMPERATURE: 97.4 F

## 2024-06-06 DIAGNOSIS — J45.41 MODERATE PERSISTENT ASTHMA WITH ACUTE EXACERBATION: ICD-10-CM

## 2024-06-06 DIAGNOSIS — F41.8 DEPRESSION WITH ANXIETY: ICD-10-CM

## 2024-06-06 DIAGNOSIS — K21.9 GERD WITHOUT ESOPHAGITIS: ICD-10-CM

## 2024-06-06 DIAGNOSIS — E66.01 CLASS 3 SEVERE OBESITY DUE TO EXCESS CALORIES WITHOUT SERIOUS COMORBIDITY WITH BODY MASS INDEX (BMI) OF 50.0 TO 59.9 IN ADULT: Primary | ICD-10-CM

## 2024-06-06 DIAGNOSIS — R12 HEARTBURN: ICD-10-CM

## 2024-06-06 PROCEDURE — 99213 OFFICE O/P EST LOW 20 MIN: CPT | Performed by: NURSE PRACTITIONER

## 2024-06-06 NOTE — PROGRESS NOTES
[facility] SURGERY  [address]  [facility] [id number] [phone]  [address]  [facility] [id number] [phone]  Dept: [phone]  6/6/2024      Jessie Romero.  [id number]  [id number]  1985  female      Chief Complaint   Patient presents with    Follow-up     Fup ALEKSANDAR        Post-Op Bariatric Medicine:   Jessie Romero presents today for follow up today for provider supervised medical weight loss.    HPI:   Today's weight is (!) 137 kg (301 lb) pounds, today's BMI is Body mass index is 50.15 kg/m²., she has a  loss of 0 pounds since the last visit.     Jessie Romero denies nausea, vomiting, reflux and reports that she is doing well. She has cut soda out completely. She does drink regular coffee in the mornings and she gets at least 64oz of water. May snack on a pb and jelly sandwhich or a protein pack with cheese and nuts. Since she has started eating 3-4oz of protein first at every meal, following with vegetables and high fiber foods and may have a bite of pasta or a starch.      Diet and Exercise: Diet history reviewed and discussed with the patient. Weight loss/gains to date discussed with the patient. The patient states they are eating 60 grams of protein per day. She reports eating 3 meals per day, a typical portion size of 1-1.5 cup, eating  0-1 snacks per day, drinking 5-6 or more 8-oz. glasses of water per day, no carbonated beverage consumption and exercising regularly- walking most days.    She reports that her portions have come down markedly and uses a small bowl instead of a large plate. She does note that during her periods she will eat a bit more those weeks.     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 She notes that she isn't tossing and turning so much.     Review of Systems   Constitutional:  Positive for appetite change. Negative  for fatigue and unexpected weight change.   HENT: Negative.     Eyes: Negative.    Respiratory: Negative.     Cardiovascular: Negative.  Negative for leg swelling.   Gastrointestinal:  Negative for abdominal distention, abdominal pain, constipation, diarrhea, nausea and vomiting.   Genitourinary:  Negative for difficulty urinating, frequency and urgency.   Musculoskeletal:  Negative for back pain.   Skin: Negative.    Psychiatric/Behavioral: Negative.     All other systems reviewed and are negative.      Patient Active Problem List   Diagnosis    Class 3 severe obesity due to excess calories without serious comorbidity with body mass index (BMI) of 50.0 to 59.9 in adult    Syncope    GERD without esophagitis    Asthma    Heartburn    Depression with anxiety    Hypersomnolence    Migraine with aura and without status migrainosus, not intractable       Past Medical History:   Diagnosis Date    Abnormal Pap smear of cervix     ADHD     Allergic 2000    Moved to [place]    Allergic rhinitis     Anemia     Anxiety     Arthritis     Asthma     Depression     GERD (gastroesophageal reflux disease)     Headache 2002    Heart murmur     History of heart attack 05/03/2024    History of night terrors     Hyperlipidemia     Migraine     Myocardial infarction 03/2010    Ovarian cyst     Tear of meniscus of knee        The following portions of the patient's history were reviewed and updated as appropriate: allergies, current medications, past family history, past medical history, past social history, past surgical history, and problem list.    Vitals:    06/06/24 1055   BP: 130/88   Pulse: 80   Temp: 97.4 °F (36.3 °C)       Physical Exam  Vitals and nursing note reviewed.   Constitutional:       Appearance: She is well-developed.   Neck:      Thyroid: No thyromegaly.   Cardiovascular:      Rate and Rhythm: Normal rate.   Pulmonary:      Effort: Pulmonary effort is normal. No respiratory distress.   Abdominal:      Palpations:  Abdomen is soft.   Musculoskeletal:         General: No tenderness.   Skin:     General: Skin is warm and dry.   Neurological:      Mental Status: She is alert.   Psychiatric:         Behavior: Behavior normal.         Assessment:   The patient is doing well.     Plan:   Continue with leading with protein at most meals. She was mildly anemic at her last labs were last drawn and it was hypochromic. The patient denies heavy periods and does not feel that checking her iron levels at this time.  Patient was advised to continue leading with protein at mealtimes avoiding red meats and heavily processed meats as her LDL was low at her last visit.  She has started walking was advised to continue to keep up the great work with this and make sure to pay attention to hydration status after drinking coffee in the mornings with replacing what she diuresis off after drinking her coffee in the afternoons.  Continue to work on cutting out soda.  She reports that she is feeling much better, sleeping better, energy levels have improved.    Encouraged patient to be sure to get plenty of lean protein per day through small frequent meals all with a protein source.   Activity restrictions: none.   Recommended patient be sure to get at least 70 grams of protein per day by eating small, frequent meals all with high lean protein choices. Be sure to limit/cut back on daily carbohydrate intake. Discussed with the patient the recommended amount of water per day to intake- half of body weight in ounces. Reviewed vitamin requirements. Be sure to do routine exercise, 150 minutes per week minimum, including both cardio and strength training.     Instructions / Recommendations: dietary counseling recommended, recommended a daily protein intake of  grams, vitamin supplement(s) recommended, recommended exercising at least 150 minutes per week, behavior modifications recommended and instructed to call the office for concerns, questions, or  problems.     The patient was instructed to follow up in one month with the dietitian and 1 month with me.    Total time spent during this encounter today was 25 minutes

## 2024-06-26 ENCOUNTER — HOSPITAL ENCOUNTER (OUTPATIENT)
Dept: SLEEP MEDICINE | Facility: HOSPITAL | Age: 39
Discharge: HOME | End: 2024-06-26
Admitting: FAMILY MEDICINE
Payer: COMMERCIAL

## 2024-06-26 DIAGNOSIS — R06.83 SNORING: ICD-10-CM

## 2024-06-26 DIAGNOSIS — R06.81 WITNESSED EPISODE OF APNEA: ICD-10-CM

## 2024-06-26 DIAGNOSIS — R29.818 SUSPECTED SLEEP APNEA: ICD-10-CM

## 2024-06-26 DIAGNOSIS — G47.19 EXCESSIVE DAYTIME SLEEPINESS: ICD-10-CM

## 2024-06-26 PROCEDURE — 95806 SLEEP STUDY UNATT&RESP EFFT: CPT

## 2024-06-26 NOTE — PROGRESS NOTES
Chief Complaint        Diarrhea (Hx of colitis, pain with bowel movements, diarrhea episodes 3-4 times per week, rectal bleeding has occurred. Has weakness and sweating when having a BM. Low HGB. ), Nausea, and Abdominal Pain    History of Present Illness      Jessie Romero is a 38 y.o. female who presents to [facility] as a new patient with a history of diarrhea, colitis, rectal bleeding, night sweats, anemia, nausea and generalized abdominal pain.  Patient reports in 2018 she had a round of Augmentin and her abdomen demand has not been the same since.  She reports the diarrhea occurring at least 3-4 times per week.  She has cut out greasy foods, lettuce, breads, pastas and milk without relief she has cut back on soda as well.  Patient reports nausea, reflux regular basis with 2 episodes of vomiting.  She is on Protonix daily.  She uses [facility] as needed.  Patient is taking probiotics.  She reports flushing and night sweats occasionally.  Patient denies fever,  weight loss, , melena, hematochezia, hematemesis.    Most recent labs- 5/30/2024    CT Abdomen Pelvis With Contrast 9/23/23     Uncomplicated probably at least moderate in degree acute-to-subacute right-sided   colitis is seen.  Consider close interval clinical and imaging follow-up of finding to ensure a   benign progression.  Otherwise, no significant acute findings are seen.  Please see above comments for further detail.      No EGD/Colon records on file.    Results       Result Review :   The following data was reviewed by: LILLIAM Null on 06/27/2024     CMP          11/15/2023    15:51 11/18/2023    18:23 5/30/2024    14:51   CMP   Glucose 82  137  78    BUN 18  13  17    Creatinine 0.77  0.72  0.80    EGFR 102.0  110.6  97.5    Sodium 141  137  139    Potassium 3.8  3.5  4.1    Chloride 105  102  103    Calcium 9.3  8.9  9.1    Total Protein 7.2  6.9  6.8    Albumin 4.5  4.3  4.2    Globulin 2.7   "2.6  2.6    Total Bilirubin <0.2  0.2  <0.2    Alkaline Phosphatase 82  83  97    AST (SGOT) 15  13  14    ALT (SGPT) 8  10  14    Albumin/Globulin Ratio 1.7  1.7  1.6    BUN/Creatinine Ratio 23.4  18.1  21.3    Anion Gap 10.3  9.0  11.6      CBC          11/15/2023    15:51 11/18/2023    18:23 5/30/2024    14:51   CBC   WBC 9.27  9.18  10.54    RBC 4.59  4.47  4.48    Hemoglobin 12.7  11.8  11.0    Hematocrit 38.6  38.0  35.3    MCV 84.1  85.0  78.8    MCH 27.7  26.4  24.6    MCHC 32.9  31.1  31.2    RDW 13.7  14.6  14.5    Platelets 410  327  377        Iron Profile No results found for: \"IRON\", \"TIBC\", \"LABIRON\", \"TRANSFERRIN\"  Ferritin No results found for: \"FERRITIN\"         Past Medical History       Past Medical History:   Diagnosis Date    Abnormal Pap smear of cervix     ADHD     Allergic 2000    Moved to [place]    Allergic rhinitis     Anemia     Anxiety     Arthritis     Asthma     Depression     GERD (gastroesophageal reflux disease)     Headache 2002    Heart murmur     History of heart attack 05/03/2024    History of night terrors     Hyperlipidemia     Migraine     Myocardial infarction 03/2010    Ovarian cyst     Tear of meniscus of knee        Past Surgical History:   Procedure Laterality Date    ADRENAL GLAND SURGERY Left 01/01/2010    [facility] [place] DR RAO    HYSTEROSCOPY LEEP BIOPSY  2011         Current Outpatient Medications:     albuterol sulfate  (90 Base) MCG/ACT inhaler, Inhale 2 puffs Every 4 (Four) Hours As Needed for Wheezing., Disp: 8.5 g, Rfl: 2    Azelastine HCl 137 MCG/SPRAY solution, Instill 1-2 sprays each nostril twice a day as needed for runny nose, sneezing or increased nasal allergy symptoms., Disp: 30 mL, Rfl: 11    budesonide-formoterol (Symbicort) 160-4.5 MCG/ACT inhaler, Inhale 2 puffs every 12 hours. Use with spacer. Rinse mouth after each use, Disp: 10.2 g, Rfl: 11    busPIRone (BUSPAR) 5 MG tablet, Take 1 tablet by mouth 2 (Two) Times a Day As Needed " (Anxiety)., Disp: 60 tablet, Rfl: 1    escitalopram (Lexapro) 10 MG tablet, Take 1 tablet by mouth Daily., Disp: 30 tablet, Rfl: 1    fexofenadine (Allegra Allergy) 180 MG tablet, Take 1 tablet by mouth Daily., Disp: 90 tablet, Rfl: 0    fluticasone (FLONASE) 50 MCG/ACT nasal spray, instill 2 sprays in each nostril once daily, Disp: 16 g, Rfl: 2    ibuprofen (ADVIL,MOTRIN) 800 MG tablet, Take 1 tablet by mouth Every 6 (Six) Hours As Needed for Mild Pain., Disp: 90 tablet, Rfl: 0    montelukast (SINGULAIR) 10 MG tablet, Take one tablet daily at bedtime, Disp: 30 tablet, Rfl: 11    pantoprazole (Protonix) 40 MG EC tablet, Take 1 tablet by mouth Daily., Disp: 90 tablet, Rfl: 1    traZODone (DESYREL) 50 MG tablet, Take 1-2 tablets by mouth every night at bedtime. (Patient taking differently: Take 1-2 tablets by mouth every night at bedtime. As needed), Disp: 60 tablet, Rfl: 1    dicyclomine (BENTYL) 20 MG tablet, Take 1 tablet by mouth Every 6 (Six) Hours., Disp: 90 tablet, Rfl: 3    sodium-potassium-magnesium sulfates (Suprep Bowel Prep Kit) 17.5-3.13-1.6 GM/177ML solution oral solution, Take 1 bottle by mouth Every 12 (Twelve) Hours., Disp: 354 mL, Rfl: 0     Allergies   Allergen Reactions    Doxycycline Anaphylaxis     Shortness of air    Morphine Hallucinations    Adhesive Tape Rash     CANNOT USE EKG ADHESIVE STRIPS/RASH & ITCHING    Latex Rash       Family History   Problem Relation Age of Onset    Hypertension Mother     Hyperlipidemia Mother     Thyroid disease Mother     Scoliosis Mother     Alcohol abuse Father         Sober 25 years now    No Known Problems Sister     No Known Problems Brother     Alcohol abuse Maternal Aunt         Dead    No Known Problems Maternal Uncle     No Known Problems Paternal Aunt     No Known Problems Paternal Uncle     Arthritis Maternal Grandmother     COPD Maternal Grandmother         Smoker    Restless legs syndrome Maternal Grandmother     Diabetes Maternal Grandfather      "Emphysema Paternal Grandmother     COPD Paternal Grandfather         Smoker    No Known Problems Cousin     Ovarian cancer Maternal Great-Grandmother     Gallbladder disease Other         gallbladder problems run in the family    Colon cancer Other         colon cancer-- great great aunt    Other Other         epilepsy    ADD / ADHD Neg Hx     Anxiety disorder Neg Hx     Bipolar disorder Neg Hx     Dementia Neg Hx     Depression Neg Hx     Drug abuse Neg Hx     OCD Neg Hx     Paranoid behavior Neg Hx     Schizophrenia Neg Hx     Seizures Neg Hx     Self-Injurious Behavior  Neg Hx     Suicide Attempts Neg Hx     Breast cancer Neg Hx     Uterine cancer Neg Hx         Social History     Social History Narrative    Not on file       Objective       Objective     Vital Signs:   /59 (BP Location: Right arm, Patient Position: Sitting, Cuff Size: Large Adult)   Pulse 75   Ht 165 cm (64.96\")   Wt (!) 140 kg (308 lb 1.6 oz)   SpO2 98%   BMI 51.33 kg/m²     Body mass index is 51.33 kg/m².    Physical Exam  Constitutional:       General: She is not in acute distress.     Appearance: Normal appearance. She is well-developed and normal weight. She is obese.   Eyes:      Conjunctiva/sclera: Conjunctivae normal.      Pupils: Pupils are equal, round, and reactive to light.      Visual Fields: Right eye visual fields normal and left eye visual fields normal.   Cardiovascular:      Rate and Rhythm: Normal rate and regular rhythm.      Heart sounds: Normal heart sounds.   Pulmonary:      Effort: Pulmonary effort is normal. No retractions.      Breath sounds: Normal breath sounds and air entry.      Comments: Inspection of chest: normal appearance  Abdominal:      General: Bowel sounds are normal.      Palpations: Abdomen is soft.      Tenderness: There is generalized no abdominal tenderness.      Comments: No appreciable hepatosplenomegaly   Musculoskeletal:      Cervical back: Neck supple.      Right lower leg: No edema.    "   Left lower leg: No edema.   Lymphadenopathy:      Cervical: No cervical adenopathy.   Skin:     Findings: No lesion.      Comments: Turgor normal   Neurological:      Mental Status: She is alert and oriented to person, place, and time.   Psychiatric:         Mood and Affect: Mood and affect normal.              Assessment & Plan          Assessment and Plan    Diagnoses and all orders for this visit:    1. GERD without esophagitis (Primary)  -     Case Request; Standing  -     Verify NPO; Standing  -     Verify Bowel Prep Was Successful; Standing  -     Give Tap Water Enema If Bowel Prep Insufficient; Standing  -     Obtain Informed Consent; Standing  -     Follow Anesthesia Guidelines / Protocol; Standing  -     Case Request    2. Heartburn  -     Case Request; Standing  -     Verify NPO; Standing  -     Verify Bowel Prep Was Successful; Standing  -     Give Tap Water Enema If Bowel Prep Insufficient; Standing  -     Obtain Informed Consent; Standing  -     Follow Anesthesia Guidelines / Protocol; Standing  -     Case Request    3. History of colitis  -     Case Request; Standing  -     Verify NPO; Standing  -     Verify Bowel Prep Was Successful; Standing  -     Give Tap Water Enema If Bowel Prep Insufficient; Standing  -     Obtain Informed Consent; Standing  -     Follow Anesthesia Guidelines / Protocol; Standing  -     Case Request    4. Altered bowel habits  -     Case Request; Standing  -     Verify NPO; Standing  -     Verify Bowel Prep Was Successful; Standing  -     Give Tap Water Enema If Bowel Prep Insufficient; Standing  -     Obtain Informed Consent; Standing  -     Follow Anesthesia Guidelines / Protocol; Standing  -     Case Request    5. Diarrhea, unspecified type  -     Case Request; Standing  -     Verify NPO; Standing  -     Verify Bowel Prep Was Successful; Standing  -     Give Tap Water Enema If Bowel Prep Insufficient; Standing  -     Obtain Informed Consent; Standing  -     Follow  Anesthesia Guidelines / Protocol; Standing  -     Case Request    6. Generalized abdominal pain  -     Case Request; Standing  -     Verify NPO; Standing  -     Verify Bowel Prep Was Successful; Standing  -     Give Tap Water Enema If Bowel Prep Insufficient; Standing  -     Obtain Informed Consent; Standing  -     Follow Anesthesia Guidelines / Protocol; Standing  -     Case Request    Other orders  -     dicyclomine (BENTYL) 20 MG tablet; Take 1 tablet by mouth Every 6 (Six) Hours.  Dispense: 90 tablet; Refill: 3  -     sodium-potassium-magnesium sulfates (Suprep Bowel Prep Kit) 17.5-3.13-1.6 GM/177ML solution oral solution; Take 1 bottle by mouth Every 12 (Twelve) Hours.  Dispense: 354 mL; Refill: 0      38-year-old female presenting the office today as a new patient with a history of diarrhea, colitis, rectal bleeding, night sweats, anemia, nausea and generalized abdominal pain.  I have recommended that the patient undergo further evaluation with a colonoscopy and EGD.  I have discussed this procedure in detail with the patient.  I have discussed the risks, benefits and alternatives.  I have discussed the risk of anesthesia, bleeding and perforation.  Patient understands these risks, benefits and alternatives and wishes to proceed.  I will schedule her at her earliest convenience. Patient agreeable to this plan and will call with any questions or concerns.  I provided Bentyl to be used as needed.            Follow Up       Follow Up   Return for Follow up after endoscopy in office.  Patient was given instructions and counseling regarding her condition or for health maintenance advice. Please see specific information pulled into the AVS if appropriate.

## 2024-06-27 ENCOUNTER — OFFICE VISIT (OUTPATIENT)
Dept: GASTROENTEROLOGY | Facility: CLINIC | Age: 39
End: 2024-06-27
Payer: COMMERCIAL

## 2024-06-27 VITALS
WEIGHT: 293 LBS | HEART RATE: 75 BPM | HEIGHT: 65 IN | BODY MASS INDEX: 48.82 KG/M2 | SYSTOLIC BLOOD PRESSURE: 121 MMHG | OXYGEN SATURATION: 98 % | DIASTOLIC BLOOD PRESSURE: 59 MMHG

## 2024-06-27 DIAGNOSIS — R19.4 ALTERED BOWEL HABITS: ICD-10-CM

## 2024-06-27 DIAGNOSIS — R19.7 DIARRHEA, UNSPECIFIED TYPE: ICD-10-CM

## 2024-06-27 DIAGNOSIS — Z87.19 HISTORY OF COLITIS: ICD-10-CM

## 2024-06-27 DIAGNOSIS — R12 HEARTBURN: ICD-10-CM

## 2024-06-27 DIAGNOSIS — K21.9 GERD WITHOUT ESOPHAGITIS: Primary | ICD-10-CM

## 2024-06-27 DIAGNOSIS — R10.84 GENERALIZED ABDOMINAL PAIN: ICD-10-CM

## 2024-06-27 RX ORDER — SODIUM, POTASSIUM,MAG SULFATES 17.5-3.13G
1 SOLUTION, RECONSTITUTED, ORAL ORAL EVERY 12 HOURS
Qty: 354 ML | Refills: 0 | Status: SHIPPED | OUTPATIENT
Start: 2024-06-27

## 2024-06-27 RX ORDER — DICYCLOMINE HCL 20 MG
20 TABLET ORAL EVERY 6 HOURS
Qty: 90 TABLET | Refills: 3 | Status: SHIPPED | OUTPATIENT
Start: 2024-06-27

## 2024-06-27 NOTE — PATIENT INSTRUCTIONS
Food Choices for Gastroesophageal Reflux Disease, Adult  When you have gastroesophageal reflux disease (GERD), the foods you eat and your eating habits are very important. Choosing the right foods can help ease your discomfort. Think about working with a food expert (dietitian) to help you make good choices.  What are tips for following this plan?  Reading food labels  Look for foods that are low in saturated fat. Foods that may help with your symptoms include:  Foods that have less than 5% of daily value (DV) of fat.  Foods that have 0 grams of trans fat.  Cooking  Do not shore your food.  Cook your food by baking, steaming, grilling, or broiling. These are all methods that do not need a lot of fat for cooking.  To add flavor, try to use herbs that are low in spice and acidity.  Meal planning    Choose healthy foods that are low in fat, such as:  Fruits and vegetables.  Whole grains.  Low-fat dairy products.  Lean meats, fish, and poultry.  Eat small meals often instead of eating 3 large meals each day. Eat your meals slowly in a place where you are relaxed. Avoid bending over or lying down until 2-3 hours after eating.  Limit high-fat foods such as fatty meats or fried foods.  Limit your intake of fatty foods, such as oils, butter, and shortening.  Avoid the following as told by your doctor:  Foods that cause symptoms. These may be different for different people. Keep a food diary to keep track of foods that cause symptoms.  Alcohol.  Drinking a lot of liquid with meals.  Eating meals during the 2-3 hours before bed.  Lifestyle  Stay at a healthy weight. Ask your doctor what weight is healthy for you. If you need to lose weight, work with your doctor to do so safely.  Exercise for at least 30 minutes on 5 or more days each week, or as told by your doctor.  Wear loose-fitting clothes.  Do not smoke or use any products that contain nicotine or tobacco. If you need help quitting, ask your doctor.  Sleep with the head  of your bed higher than your feet. Use a wedge under the mattress or blocks under the bed frame to raise the head of the bed.  Chew sugar-free gum after meals.  What foods should eat?    Eat a healthy, well-balanced diet of fruits, vegetables, whole grains, low-fat dairy products, lean meats, fish, and poultry. Each person is different.  Foods that may cause symptoms in one person may not cause any symptoms in another person. Work with your doctor to find foods that are safe for you.  The items listed above may not be a complete list of what you can eat and drink. Contact a food expert for more options.  What foods should I avoid?  Limiting some of these foods may help in managing the symptoms of GERD. Everyone is different. Talk with a food expert or your doctor to help you find the exact foods to avoid, if any.  Fruits  Any fruits prepared with added fat. Any fruits that cause symptoms. For some people, this may include citrus fruits, such as oranges, grapefruit, pineapple, and fernando.  Vegetables  Deep-fried vegetables. French fries. Any vegetables prepared with added fat. Any vegetables that cause symptoms. For some people, this may include tomatoes and tomato products, chili peppers, onions and garlic, and horseradish.  Grains  Pastries or quick breads with added fat.  Meats and other proteins  High-fat meats, such as fatty beef or pork, hot dogs, ribs, ham, sausage, salami, and montague. Fried meat or protein, including fried fish and fried chicken. Nuts and nut butters, in large amounts.  Dairy  Whole milk and chocolate milk. Sour cream. Cream. Ice cream. Cream cheese. Milkshakes.  Fats and oils  Butter. Margarine. Shortening. Ghee.  Beverages  Coffee and tea, with or without caffeine. Carbonated beverages. Sodas. Energy drinks. Fruit juice made with acidic fruits, such as orange or grapefruit. Tomato juice. Alcoholic drinks.  Sweets and desserts  Chocolate and cocoa. Donuts.  Seasonings and condiments  Pepper.  Peppermint and spearmint. Added salt. Any condiments, herbs, or seasonings that cause symptoms. For some people, this may include balbuena, hot sauce, or vinegar-based salad dressings.  The items listed above may not be a complete list of what you should not eat and drink. Contact a food expert for more options.  Questions to ask your doctor  Diet and lifestyle changes are often the first steps that are taken to manage symptoms of GERD. If diet and lifestyle changes do not help, talk with your doctor about taking medicines.  Where to find more information  International Foundation for Gastrointestinal Disorders: aboutgerd.org  Summary  When you have GERD, food and lifestyle choices are very important in easing your symptoms.  Eat small meals often instead of 3 large meals a day. Eat your meals slowly and in a place where you are relaxed.  Avoid bending over or lying down until 2-3 hours after eating.  Limit high-fat foods such as fatty meats or fried foods.  This information is not intended to replace advice given to you by your health care provider. Make sure you discuss any questions you have with your health care provider.  Document Revised: 06/28/2021 Document Reviewed: 06/28/2021  Elsevier Patient Education © 2024 [employer] Inc[employer]  Gastroesophageal Reflux Disease, Adult  Gastroesophageal reflux (LUX) happens when acid from the stomach flows up into the tube that connects the mouth and the stomach (esophagus). Normally, food travels down the esophagus and stays in the stomach to be digested. However, when a person has LUX, food and stomach acid sometimes move back up into the esophagus. If this becomes a more serious problem, the person may be diagnosed with a disease called gastroesophageal reflux disease (GERD). GERD occurs when the reflux:  Happens often.  Causes frequent or severe symptoms.  Causes problems such as damage to the esophagus.  When stomach acid comes in contact with the esophagus, the acid may cause  inflammation in the esophagus. Over time, GERD may create small holes (ulcers) in the lining of the esophagus.  What are the causes?  This condition is caused by a problem with the muscle between the esophagus and the stomach (lower esophageal sphincter, or LES). Normally, the LES muscle closes after food passes through the esophagus to the stomach. When the LES is weakened or abnormal, it does not close properly, and that allows food and stomach acid to go back up into the esophagus.  The LES can be weakened by certain dietary substances, medicines, and medical conditions, including:  Tobacco use.  Pregnancy.  Having a hiatal hernia.  Alcohol use.  Certain foods and beverages, such as coffee, chocolate, onions, and peppermint.  What increases the risk?  You are more likely to develop this condition if you:  Have an increased body weight.  Have a connective tissue disorder.  Take NSAIDs, such as ibuprofen.  What are the signs or symptoms?  Symptoms of this condition include:  Heartburn.  Difficult or painful swallowing and the feeling of having a lump in the throat.  A bitter taste in the mouth.  Bad breath and having a large amount of saliva.  Having an upset or bloated stomach and belching.  Chest pain. Different conditions can cause chest pain. Make sure you see your health care provider if you experience chest pain.  Shortness of breath or wheezing.  Ongoing (chronic) cough or a nighttime cough.  Wearing away of tooth enamel.  Weight loss.  How is this diagnosed?  This condition may be diagnosed based on a medical history and a physical exam. To determine if you have mild or severe GERD, your health care provider may also monitor how you respond to treatment. You may also have tests, including:  A test to examine your stomach and esophagus with a small camera (endoscopy).  A test that measures the acidity level in your esophagus.  A test that measures how much pressure is on your esophagus.  A barium swallow or  modified barium swallow test to show the shape, size, and functioning of your esophagus.  How is this treated?  Treatment for this condition may vary depending on how severe your symptoms are. Your health care provider may recommend:  Changes to your diet.  Medicine.  Surgery.  The goal of treatment is to help relieve your symptoms and to prevent complications.  Follow these instructions at home:  Eating and drinking    Follow a diet as recommended by your health care provider. This may involve avoiding foods and drinks such as:  Coffee and tea, with or without caffeine.  Drinks that contain alcohol.  Energy drinks and sports drinks.  Carbonated drinks or sodas.  Chocolate and cocoa.  Peppermint and mint flavorings.  Garlic and onions.  Horseradish.  Spicy and acidic foods, including peppers, chili powder, balbuena powder, vinegar, hot sauces, and barbecue sauce.  Citrus fruit juices and citrus fruits, such as oranges, fernando, and limes.  Tomato-based foods, such as red sauce, chili, salsa, and pizza with red sauce.  Fried and fatty foods, such as donuts, french fries, potato chips, and high-fat dressings.  High-fat meats, such as hot dogs and fatty cuts of red and white meats, such as rib eye steak, sausage, ham, and montague.  High-fat dairy items, such as whole milk, butter, and cream cheese.  Eat small, frequent meals instead of large meals.  Avoid drinking large amounts of liquid with your meals.  Avoid eating meals during the 2-3 hours before bedtime.  Avoid lying down right after you eat.  Do not exercise right after you eat.  Lifestyle    Do not use any products that contain nicotine or tobacco. These products include cigarettes, chewing tobacco, and vaping devices, such as e-cigarettes. If you need help quitting, ask your health care provider.  Try to reduce your stress by using methods such as yoga or meditation. If you need help reducing stress, ask your health care provider.  If you are overweight, reduce your  weight to an amount that is healthy for you. Ask your health care provider for guidance about a safe weight loss goal.  General instructions  Pay attention to any changes in your symptoms.  Take over-the-counter and prescription medicines only as told by your health care provider. Do not take aspirin, ibuprofen, or other NSAIDs unless your health care provider told you to take these medicines.  Wear loose-fitting clothing. Do not wear anything tight around your waist that causes pressure on your abdomen.  Raise (elevate) the head of your bed about 6 inches (15 cm). You can use a wedge to do this.  Avoid bending over if this makes your symptoms worse.  Keep all follow-up visits. This is important.  Contact a health care provider if:  You have:  New symptoms.  Unexplained weight loss.  Difficulty swallowing or it hurts to swallow.  Wheezing or a persistent cough.  A hoarse voice.  Your symptoms do not improve with treatment.  Get help right away if:  You have sudden pain in your arms, neck, jaw, teeth, or back.  You suddenly feel sweaty, dizzy, or light-headed.  You have chest pain or shortness of breath.  You vomit and the vomit is green, yellow, or black, or it looks like blood or coffee grounds.  You faint.  You have stool that is red, bloody, or black.  You cannot swallow, drink, or eat.  These symptoms may represent a serious problem that is an emergency. Do not wait to see if the symptoms will go away. Get medical help right away. Call your local emergency services (911 in the [place] Do not drive yourself to the hospital.  Summary  Gastroesophageal reflux happens when acid from the stomach flows up into the esophagus. GERD is a disease in which the reflux happens often, causes frequent or severe symptoms, or causes problems such as damage to the esophagus.  Treatment for this condition may vary depending on how severe your symptoms are. Your health care provider may recommend diet and lifestyle changes,  "medicine, or surgery.  Contact a health care provider if you have new or worsening symptoms.  Take over-the-counter and prescription medicines only as told by your health care provider. Do not take aspirin, ibuprofen, or other NSAIDs unless your health care provider told you to do so.  Keep all follow-up visits as told by your health care provider. This is important.  This information is not intended to replace advice given to you by your health care provider. Make sure you discuss any questions you have with your health care provider.  Document Revised: 06/28/2021 Document Reviewed: 06/28/2021  [employer] Patient Education © 2024 [employer]  Diet for Irritable Bowel Syndrome  When you have irritable bowel syndrome (IBS), it is very important to follow the eating habits that are best for your condition. IBS may cause various symptoms, such as pain in the abdomen, constipation, or diarrhea.  Choosing the right foods can help to ease the discomfort from these symptoms. Work with your health care provider and dietitian to find the eating plan that will help to control your symptoms.  What are tips for following this plan?    Keep a food diary. This will help you identify foods that cause symptoms. Write down:  What you eat and when you eat it.  What symptoms you have.  When symptoms occur in relation to your meals, such as \"pain in abdomen 2 hours after dinner.\"  Eat your meals slowly and in a relaxed setting.  Aim to eat 5-6 small meals per day. Do not skip meals.  Drink enough fluid to keep your urine pale yellow.  Ask your health care provider if you should take an over-the-counter probiotic to help restore healthy bacteria in your gut (digestive tract). Probiotics are foods that contain good bacteria and yeasts.  Your dietitian may have specific dietary recommendations for you based on your symptoms. Your dietitian may recommend that you:  Avoid foods that cause symptoms. Talk with your dietitian about other ways " to get the same nutrients that are in those problem foods.  Avoid foods with gluten. Gluten is a protein that is found in rye, wheat, and barley.  Eat more foods that contain soluble fiber. Examples of foods with high soluble fiber include oats, seeds, and certain fruits and vegetables. Take a fiber supplement if told by your dietitian.  Reduce or avoid certain foods called FODMAPs. These are foods that contain sugars that are hard for some people to digest. Ask your health care provider which foods to avoid.  What foods should I avoid?  The following are some foods and drinks that may make your symptoms worse:  Fatty foods, such as french fries.  Foods that contain gluten, such as pasta and cereal.  Dairy products, such as milk, cheese, and ice cream.  Spicy foods.  Alcohol.  Products with caffeine, such as coffee, tea, or chocolate.  Carbonated drinks, such as soda.  Foods that are high in FODMAPs. These include certain fruits and vegetables.  Products with sweeteners such as honey, high fructose corn syrup, sorbitol, and mannitol.  The items listed above may not be a complete list of foods and beverages you should avoid. Contact a dietitian for more information.  What foods are good sources of fiber?  Your health care provider or dietitian may recommend that you eat more foods that contain fiber. Fiber can help to reduce constipation and other IBS symptoms. Add foods with fiber to your diet a little at a time so your body can get used to them. Too much fiber at one time might cause gas and swelling of your abdomen. The following are some foods that are good sources of fiber:  Berries, such as raspberries, strawberries, and blueberries.  Tomatoes.  Carrots.  Brown rice.  Oats.  Seeds, such as yadi and pumpkin seeds.  The items listed above may not be a complete list of recommended sources of fiber. Contact your dietitian for more options.  Where to find more information  International Foundation for Functional  Gastrointestinal Disorders: aboutibs.org  National [place] of Diabetes and Digestive and Kidney Diseases: niddk.nih.gov  Summary  When you have irritable bowel syndrome (IBS), it is very important to follow the eating habits that are best for your condition.  IBS may cause various symptoms, such as pain in the abdomen, constipation, or diarrhea.  Choosing the right foods can help to ease the discomfort that comes from symptoms.  Your health care provider or dietitian may recommend that you eat more foods that contain fiber.  Keep a food diary. This will help you identify foods that cause symptoms.  This information is not intended to replace advice given to you by your health care provider. Make sure you discuss any questions you have with your health care provider.  Document Revised: 11/29/2022 Document Reviewed: 11/29/2022  Elsevier Patient Education © 2024 Elsevier Inc.

## 2024-06-28 DIAGNOSIS — G47.33 OBSTRUCTIVE SLEEP APNEA, ADULT: Primary | ICD-10-CM

## 2024-06-28 DIAGNOSIS — G47.34 SLEEP RELATED HYPOXIA: ICD-10-CM

## 2024-06-28 PROCEDURE — 95806 SLEEP STUDY UNATT&RESP EFFT: CPT | Performed by: FAMILY MEDICINE

## 2024-07-02 ENCOUNTER — ANESTHESIA EVENT (OUTPATIENT)
Dept: GASTROENTEROLOGY | Facility: HOSPITAL | Age: 39
End: 2024-07-02
Payer: COMMERCIAL

## 2024-07-03 NOTE — ANESTHESIA PREPROCEDURE EVALUATION
Anesthesia Evaluation     Patient summary reviewed and Nursing notes reviewed   NPO Solid Status: > 8 hours  NPO Liquid Status: > 2 hours           Airway   Mallampati: III  TM distance: >3 FB  Neck ROM: limited  No difficulty expected  Dental    (+) poor dentition    Pulmonary     breath sounds clear to auscultation  (+) a smoker (quit 2001) Former, cigarettes, asthma (daily inhaler use.),  Cardiovascular     ECG reviewed  Rhythm: regular  Rate: normal    (+) valvular problems/murmurs murmur, past MI (MI in 2005 r/t adrenal mass and energy drink usage-no longer consuming.) , hyperlipidemia      Neuro/Psych  (+) headaches, syncope (r/t panic attack), psychiatric history Anxiety, Depression and ADHD  GI/Hepatic/Renal/Endo    (+) obesity, morbid obesity, GERD well controlled    Musculoskeletal     Abdominal     Abdomen: soft.  Bowel sounds: normal.   Substance History      OB/GYN          Other   arthritis (hx of scoliosis and sciatica),     ROS/Med Hx Other: EKG 11/18/23: HR 71,   Sinus rhythm  Low voltage, precordial leads  Borderline T abnormalities, diffuse leads    ECHO 02/21/23:    The study is technically difficult for diagnosis.  ·  Left ventricular systolic function is normal. Estimated left ventricular EF = 55%  ·  Left ventricular diastolic function was normal    Hx colitis, change in bowel habits.        Phys Exam Other: Multiple missing teeth               Anesthesia Plan    ASA 3     general   total IV anesthesia  (Total IV Anesthesia    Patient understands anesthesia not responsible for dental damage.  )  intravenous induction     Anesthetic plan, risks, benefits, and alternatives have been provided, discussed and informed consent has been obtained with: patient.  Pre-procedure education provided  Plan discussed with CRNA.      CODE STATUS:

## 2024-07-08 ENCOUNTER — ANESTHESIA (OUTPATIENT)
Dept: GASTROENTEROLOGY | Facility: HOSPITAL | Age: 39
End: 2024-07-08
Payer: COMMERCIAL

## 2024-07-08 ENCOUNTER — HOSPITAL ENCOUNTER (OUTPATIENT)
Facility: HOSPITAL | Age: 39
Setting detail: HOSPITAL OUTPATIENT SURGERY
Discharge: HOME | End: 2024-07-08
Attending: INTERNAL MEDICINE | Admitting: INTERNAL MEDICINE
Payer: COMMERCIAL

## 2024-07-08 VITALS
OXYGEN SATURATION: 95 % | DIASTOLIC BLOOD PRESSURE: 67 MMHG | WEIGHT: 293 LBS | HEART RATE: 63 BPM | HEIGHT: 65 IN | TEMPERATURE: 97.1 F | SYSTOLIC BLOOD PRESSURE: 116 MMHG | RESPIRATION RATE: 22 BRPM | BODY MASS INDEX: 48.82 KG/M2

## 2024-07-08 DIAGNOSIS — R12 HEARTBURN: ICD-10-CM

## 2024-07-08 DIAGNOSIS — R10.84 GENERALIZED ABDOMINAL PAIN: ICD-10-CM

## 2024-07-08 DIAGNOSIS — Z87.19 HISTORY OF COLITIS: ICD-10-CM

## 2024-07-08 DIAGNOSIS — R19.7 DIARRHEA, UNSPECIFIED TYPE: ICD-10-CM

## 2024-07-08 DIAGNOSIS — K21.9 GERD WITHOUT ESOPHAGITIS: ICD-10-CM

## 2024-07-08 DIAGNOSIS — R19.4 ALTERED BOWEL HABITS: ICD-10-CM

## 2024-07-08 LAB
B-HCG UR QL: NEGATIVE
GLUCOSE BLDC GLUCOMTR-MCNC: 108 MG/DL (ref 70–99)

## 2024-07-08 PROCEDURE — 88305 TISSUE EXAM BY PATHOLOGIST: CPT | Performed by: INTERNAL MEDICINE

## 2024-07-08 PROCEDURE — 25810000003 LACTATED RINGERS PER 1000 ML

## 2024-07-08 PROCEDURE — 81025 URINE PREGNANCY TEST: CPT | Performed by: INTERNAL MEDICINE

## 2024-07-08 PROCEDURE — 82948 REAGENT STRIP/BLOOD GLUCOSE: CPT

## 2024-07-08 PROCEDURE — 25010000002 PROPOFOL 10 MG/ML EMULSION: Performed by: NURSE ANESTHETIST, CERTIFIED REGISTERED

## 2024-07-08 RX ORDER — SODIUM CHLORIDE, SODIUM LACTATE, POTASSIUM CHLORIDE, CALCIUM CHLORIDE 600; 310; 30; 20 MG/100ML; MG/100ML; MG/100ML; MG/100ML
30 INJECTION, SOLUTION INTRAVENOUS CONTINUOUS
Status: DISCONTINUED | OUTPATIENT
Start: 2024-07-08 | End: 2024-07-08 | Stop reason: HOSPADM

## 2024-07-08 RX ORDER — LIDOCAINE HYDROCHLORIDE 20 MG/ML
INJECTION, SOLUTION EPIDURAL; INFILTRATION; INTRACAUDAL; PERINEURAL AS NEEDED
Status: DISCONTINUED | OUTPATIENT
Start: 2024-07-08 | End: 2024-07-08 | Stop reason: SURG

## 2024-07-08 RX ORDER — PROPOFOL 10 MG/ML
VIAL (ML) INTRAVENOUS AS NEEDED
Status: DISCONTINUED | OUTPATIENT
Start: 2024-07-08 | End: 2024-07-08 | Stop reason: SURG

## 2024-07-08 RX ADMIN — SODIUM CHLORIDE, POTASSIUM CHLORIDE, SODIUM LACTATE AND CALCIUM CHLORIDE 30 ML/HR: 600; 310; 30; 20 INJECTION, SOLUTION INTRAVENOUS at 09:03

## 2024-07-08 RX ADMIN — LIDOCAINE HYDROCHLORIDE 100 MG: 20 INJECTION, SOLUTION INTRAVENOUS at 09:46

## 2024-07-08 RX ADMIN — PROPOFOL 150 MCG/KG/MIN: 10 INJECTION, EMULSION INTRAVENOUS at 09:47

## 2024-07-08 RX ADMIN — PROPOFOL 100 MG: 10 INJECTION, EMULSION INTRAVENOUS at 09:46

## 2024-07-08 NOTE — H&P
Pre Procedure History & Physical    Chief Complaint:   Diarrhea  Rectal bleeding  Nausea  Generalized abdominal pain    Subjective     HPI:   As above    Past Medical History:   Past Medical History:   Diagnosis Date    Abnormal Pap smear of cervix     ADHD     Adrenal mass     AT AGE 26    Allergic 2000    Moved to [place]    Allergic rhinitis     Anemia     Anxiety     Arthritis     Asthma     Depression     GERD (gastroesophageal reflux disease)     Headache 2002    Heart murmur     History of heart attack 05/03/2024    History of night terrors     Hyperlipidemia     Migraine     Myocardial infarction 03/2010    Ovarian cyst     Tear of meniscus of knee        Past Surgical History:  Past Surgical History:   Procedure Laterality Date    ADRENAL GLAND SURGERY Left 01/01/2010    [facility] [place] [address] [facility]O    HYSTEROSCOPY LEEP BIOPSY  2011       Family History:  Family History   Problem Relation Age of Onset    Hypertension Mother     Hyperlipidemia Mother     Thyroid disease Mother     Scoliosis Mother     Alcohol abuse Father         Sober 25 years now    No Known Problems Sister     No Known Problems Brother     Alcohol abuse Maternal Aunt         Dead    No Known Problems Maternal Uncle     No Known Problems Paternal Aunt     No Known Problems Paternal Uncle     Arthritis Maternal Grandmother     COPD Maternal Grandmother         Smoker    Restless legs syndrome Maternal Grandmother     Diabetes Maternal Grandfather     Emphysema Paternal Grandmother     COPD Paternal Grandfather         Smoker    No Known Problems Cousin     Ovarian cancer Maternal Great-Grandmother     Gallbladder disease Other         gallbladder problems run in the family    Colon cancer Other         colon cancer-- great great aunt    Other Other         epilepsy    ADD / ADHD Neg Hx     Anxiety disorder Neg Hx     Bipolar disorder Neg Hx     Dementia Neg Hx     Depression Neg Hx     Drug abuse Neg Hx     OCD Neg Hx     Paranoid behavior  Neg Hx     Schizophrenia Neg Hx     Seizures Neg Hx     Self-Injurious Behavior  Neg Hx     Suicide Attempts Neg Hx     Breast cancer Neg Hx     Uterine cancer Neg Hx        Social History:   reports that she quit smoking about 4 years ago. Her smoking use included cigarettes. She started smoking about 7 years ago. She has a 6 pack-year smoking history. She has been exposed to tobacco smoke. She has never used smokeless tobacco. She reports that she does not currently use alcohol after a past usage of about 3.0 standard drinks of alcohol per week. She reports that she does not use drugs.    Medications:   Medications Prior to Admission   Medication Sig Dispense Refill Last Dose    albuterol sulfate  (90 Base) MCG/ACT inhaler Inhale 2 puffs Every 4 (Four) Hours As Needed for Wheezing. 8.5 g 2 7/7/2024    Azelastine HCl 137 MCG/SPRAY solution Instill 1-2 sprays each nostril twice a day as needed for runny nose, sneezing or increased nasal allergy symptoms. 30 mL 11 Past Week    budesonide-formoterol (Symbicort) 160-4.5 MCG/ACT inhaler Inhale 2 puffs every 12 hours. Use with spacer. Rinse mouth after each use 10.2 g 11 7/7/2024    busPIRone (BUSPAR) 5 MG tablet Take 1 tablet by mouth 2 (Two) Times a Day As Needed (Anxiety). 60 tablet 1 Past Week    dicyclomine (BENTYL) 20 MG tablet Take 1 tablet by mouth Every 6 (Six) Hours. 90 tablet 3 Past Week    escitalopram (Lexapro) 10 MG tablet Take 1 tablet by mouth Daily. 30 tablet 1 Past Week    fexofenadine (Allegra Allergy) 180 MG tablet Take 1 tablet by mouth Daily. 90 tablet 0 Past Week    fluticasone (FLONASE) 50 MCG/ACT nasal spray instill 2 sprays in each nostril once daily 16 g 2 Past Week    ibuprofen (ADVIL,MOTRIN) 800 MG tablet Take 1 tablet by mouth Every 6 (Six) Hours As Needed for Mild Pain. 90 tablet 0 Past Week    montelukast (SINGULAIR) 10 MG tablet Take one tablet daily at bedtime 30 tablet 11 Past Week    pantoprazole (Protonix) 40 MG EC tablet Take  "1 tablet by mouth Daily. 90 tablet 1 Past Week    traZODone (DESYREL) 50 MG tablet Take 1-2 tablets by mouth every night at bedtime. (Patient taking differently: Take 1-2 tablets by mouth every night at bedtime. As needed) 60 tablet 1 Past Week       Allergies:  Doxycycline, Morphine, Adhesive tape, and Latex        Objective     Blood pressure 122/61, pulse 60, temperature 98 °F (36.7 °C), temperature source Temporal, resp. rate 20, height 165.1 cm (65\"), weight 135 kg (297 lb 2.9 oz), last menstrual period 06/15/2024, SpO2 97%, not currently breastfeeding.    Physical Exam   Constitutional: Pt is oriented to person, place, and time and well-developed, well-nourished, and in no distress.   Mouth/Throat: Oropharynx is clear and moist.   Neck: Normal range of motion.   Cardiovascular: Normal rate, regular rhythm and normal heart sounds.    Pulmonary/Chest: Effort normal and breath sounds normal.   Abdominal: Soft. Nontender  Skin: Skin is warm and dry.   Psychiatric: Mood, memory, affect and judgment normal.     Assessment & Plan     Diagnosis:  Diarrhea  Rectal bleeding  Nausea  Generalized abdominal pain    Anticipated Surgical Procedure:  Egd  colonoscopy    The risks, benefits, and alternatives of this procedure have been discussed with the patient or the responsible party- the patient understands and agrees to proceed.            "

## 2024-07-08 NOTE — ANESTHESIA POSTPROCEDURE EVALUATION
Patient: Jessie Romero    Procedure Summary       Date: 07/08/24 Room / Location: [facility] ENDOSCOPY 2 / [facility] ENDOSCOPY    Anesthesia Start: 0944 Anesthesia Stop: 1013    Procedures:       ESOPHAGOGASTRODUODENOSCOPY WITH BIOPSIES      COLONOSCOPY Diagnosis:       GERD without esophagitis      Heartburn      History of colitis      Altered bowel habits      Diarrhea, unspecified type      Generalized abdominal pain      (GERD without esophagitis [K21.9])      (Heartburn [R12])      (History of colitis [Z87.19])      (Altered bowel habits [R19.4])      (Diarrhea, unspecified type [R19.7])      (Generalized abdominal pain [R10.84])    Surgeons: Monroe Huang MD Provider: Dayana Randhawa CRNA    Anesthesia Type: general ASA Status: 3            Anesthesia Type: general    Vitals  Vitals Value Taken Time   /67 07/08/24 1029   Temp 36.2 °C (97.1 °F) 07/08/24 1027   Pulse 63 07/08/24 1029   Resp 22 07/08/24 1027   SpO2 95 % 07/08/24 1029           Post Anesthesia Care and Evaluation    Post-procedure mental status: acceptable.  Pain management: satisfactory to patient    Airway patency: patent  Anesthetic complications: No anesthetic complications    Cardiovascular status: acceptable  Respiratory status: acceptable    Comments: Per chart review

## 2024-07-09 LAB
CYTO UR: NORMAL
LAB AP CASE REPORT: NORMAL
LAB AP CLINICAL INFORMATION: NORMAL
PATH REPORT.FINAL DX SPEC: NORMAL
PATH REPORT.GROSS SPEC: NORMAL

## 2024-07-09 NOTE — SIGNIFICANT NOTE
"Patient states she has some \"fullness\" in her abdomen. States she is passing gas and tolerating fluids and food. Instructed patient if she developed a fever or increased pain to contact Dr. Huang's office.  "

## 2024-07-11 ENCOUNTER — TELEPHONE (OUTPATIENT)
Dept: INTERNAL MEDICINE | Facility: CLINIC | Age: 39
End: 2024-07-11
Payer: COMMERCIAL

## 2024-07-11 ENCOUNTER — TELEPHONE (OUTPATIENT)
Dept: GASTROENTEROLOGY | Facility: CLINIC | Age: 39
End: 2024-07-11
Payer: COMMERCIAL

## 2024-07-11 DIAGNOSIS — R89.9 ABNORMAL LABORATORY TEST: Primary | ICD-10-CM

## 2024-07-11 NOTE — TELEPHONE ENCOUNTER
Caller: Jessie Romero    Relationship: Self    Best call back number: [phone]     What is the best time to reach you: ANY    Who are you requesting to speak with (clinical staff, provider,  specific staff member): CLINICAL STAFF    What was the call regarding: PATIENT CALLED STATING THAT SINCE HER PROCEDURES, SHE HAS BEEN HAVING WAVES OF FATIGUE WHILE PERFORMING SIMPLE TASKS. SHE WOULD LIKE TO KNOW WHAT IMAN WOULD RECOMMEND FOR HER TO DO.

## 2024-07-11 NOTE — TELEPHONE ENCOUNTER
"Ion MENDOZA MA received vm from patient stating after her scope's she is having \"extreme exhaustion\" has a hard time to do simple house hold tasks, will feel numb and dizzy. takes a while to get back on her feet..     Reached out to patient to review symptoms:    Patient states that she has been having waves of fatigue that have been \"reoccurring lately\".  States this happens with activity and resolves with rest.  Denies SOB, chest pain and chest palpitations.  States that she does have a \"tingly\" feeling at times in her upper arms / bicep area.    Last CBC was drawn on 05.30.24 with Hgb of 11.    Advised patient to go to ED for further evaluation where they can draw labs and if interventions were needed, they could do so.  Patient states she would rather not go to ED, stating she is \"feeling better\" at this time.  She does agree to have labs drawn.    Reviewed with provider LILLIAM Null, orders placed.  "

## 2024-07-11 NOTE — TELEPHONE ENCOUNTER
Spoke with patient and advise her to contact her surgeons office in regards this, pt verbalized understanding.

## 2024-07-12 ENCOUNTER — LAB (OUTPATIENT)
Dept: LAB | Facility: HOSPITAL | Age: 39
End: 2024-07-12
Payer: COMMERCIAL

## 2024-07-12 DIAGNOSIS — R89.9 ABNORMAL LABORATORY TEST: ICD-10-CM

## 2024-07-12 LAB
ALBUMIN SERPL-MCNC: 4 G/DL (ref 3.5–5.2)
ALBUMIN/GLOB SERPL: 1.4 G/DL
ALP SERPL-CCNC: 86 U/L (ref 39–117)
ALT SERPL W P-5'-P-CCNC: 11 U/L (ref 1–33)
ANION GAP SERPL CALCULATED.3IONS-SCNC: 11.1 MMOL/L (ref 5–15)
AST SERPL-CCNC: 14 U/L (ref 1–32)
BILIRUB SERPL-MCNC: 0.2 MG/DL (ref 0–1.2)
BUN SERPL-MCNC: 13 MG/DL (ref 6–20)
BUN/CREAT SERPL: 20.6 (ref 7–25)
CALCIUM SPEC-SCNC: 8.7 MG/DL (ref 8.6–10.5)
CHLORIDE SERPL-SCNC: 104 MMOL/L (ref 98–107)
CO2 SERPL-SCNC: 21.9 MMOL/L (ref 22–29)
CREAT SERPL-MCNC: 0.63 MG/DL (ref 0.57–1)
DEPRECATED RDW RBC AUTO: 40.6 FL (ref 37–54)
EGFRCR SERPLBLD CKD-EPI 2021: 117.3 ML/MIN/1.73
ERYTHROCYTE [DISTWIDTH] IN BLOOD BY AUTOMATED COUNT: 14.3 % (ref 12.3–15.4)
GLOBULIN UR ELPH-MCNC: 2.8 GM/DL
GLUCOSE SERPL-MCNC: 93 MG/DL (ref 65–99)
HCT VFR BLD AUTO: 33.7 % (ref 34–46.6)
HGB BLD-MCNC: 10.4 G/DL (ref 12–15.9)
IRON 24H UR-MRATE: 33 MCG/DL (ref 37–145)
IRON SATN MFR SERPL: 7 % (ref 20–50)
MCH RBC QN AUTO: 24.4 PG (ref 26.6–33)
MCHC RBC AUTO-ENTMCNC: 30.9 G/DL (ref 31.5–35.7)
MCV RBC AUTO: 78.9 FL (ref 79–97)
PLATELET # BLD AUTO: 347 10*3/MM3 (ref 140–450)
PMV BLD AUTO: 10.5 FL (ref 6–12)
POTASSIUM SERPL-SCNC: 3.9 MMOL/L (ref 3.5–5.2)
PROT SERPL-MCNC: 6.8 G/DL (ref 6–8.5)
RBC # BLD AUTO: 4.27 10*6/MM3 (ref 3.77–5.28)
SODIUM SERPL-SCNC: 137 MMOL/L (ref 136–145)
TIBC SERPL-MCNC: 457 MCG/DL (ref 298–536)
TRANSFERRIN SERPL-MCNC: 307 MG/DL (ref 200–360)
WBC NRBC COR # BLD AUTO: 8.03 10*3/MM3 (ref 3.4–10.8)

## 2024-07-12 PROCEDURE — 85027 COMPLETE CBC AUTOMATED: CPT

## 2024-07-12 PROCEDURE — 83540 ASSAY OF IRON: CPT

## 2024-07-12 PROCEDURE — 84466 ASSAY OF TRANSFERRIN: CPT

## 2024-07-12 PROCEDURE — 36415 COLL VENOUS BLD VENIPUNCTURE: CPT

## 2024-07-12 PROCEDURE — 80053 COMPREHEN METABOLIC PANEL: CPT

## 2024-07-16 DIAGNOSIS — D64.9 ANEMIA, UNSPECIFIED TYPE: Primary | ICD-10-CM

## 2024-07-16 DIAGNOSIS — R89.9 ABNORMAL LABORATORY TEST: ICD-10-CM

## 2024-07-24 ENCOUNTER — HOSPITAL ENCOUNTER (OUTPATIENT)
Dept: SLEEP MEDICINE | Facility: HOSPITAL | Age: 39
Discharge: HOME | End: 2024-07-24
Admitting: FAMILY MEDICINE
Payer: COMMERCIAL

## 2024-07-24 DIAGNOSIS — G47.34 SLEEP RELATED HYPOXIA: ICD-10-CM

## 2024-07-24 DIAGNOSIS — G47.33 OBSTRUCTIVE SLEEP APNEA, ADULT: ICD-10-CM

## 2024-07-24 PROCEDURE — 95811 POLYSOM 6/>YRS CPAP 4/> PARM: CPT

## 2024-08-02 DIAGNOSIS — G47.33 OBSTRUCTIVE SLEEP APNEA, ADULT: Primary | ICD-10-CM

## 2024-08-02 DIAGNOSIS — F32.A DEPRESSION, UNSPECIFIED DEPRESSION TYPE: ICD-10-CM

## 2024-08-02 PROCEDURE — 95811 POLYSOM 6/>YRS CPAP 4/> PARM: CPT | Performed by: FAMILY MEDICINE

## 2024-08-06 ENCOUNTER — OFFICE VISIT (OUTPATIENT)
Dept: BEHAVIORAL HEALTH | Facility: CLINIC | Age: 39
End: 2024-08-06
Payer: COMMERCIAL

## 2024-08-06 VITALS
HEART RATE: 83 BPM | HEIGHT: 65 IN | BODY MASS INDEX: 48.82 KG/M2 | DIASTOLIC BLOOD PRESSURE: 70 MMHG | WEIGHT: 293 LBS | SYSTOLIC BLOOD PRESSURE: 136 MMHG

## 2024-08-06 DIAGNOSIS — F51.01 PRIMARY INSOMNIA: ICD-10-CM

## 2024-08-06 DIAGNOSIS — F41.1 GENERALIZED ANXIETY DISORDER: ICD-10-CM

## 2024-08-06 DIAGNOSIS — F43.10 POST TRAUMATIC STRESS DISORDER (PTSD): ICD-10-CM

## 2024-08-06 DIAGNOSIS — F33.2 SEVERE EPISODE OF RECURRENT MAJOR DEPRESSIVE DISORDER, WITHOUT PSYCHOTIC FEATURES: Primary | ICD-10-CM

## 2024-08-06 PROCEDURE — 99214 OFFICE O/P EST MOD 30 MIN: CPT

## 2024-08-06 PROCEDURE — 90836 PSYTX W PT W E/M 45 MIN: CPT

## 2024-08-06 RX ORDER — ESCITALOPRAM OXALATE 10 MG/1
10 TABLET ORAL DAILY
Qty: 30 TABLET | Refills: 1 | Status: SHIPPED | OUTPATIENT
Start: 2024-08-06

## 2024-08-06 RX ORDER — BUSPIRONE HYDROCHLORIDE 5 MG/1
5 TABLET ORAL 2 TIMES DAILY PRN
Qty: 60 TABLET | Refills: 1 | Status: SHIPPED | OUTPATIENT
Start: 2024-08-06

## 2024-08-06 NOTE — PROGRESS NOTES
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 Ref Range Status   05/30/2024 1.190 0.270 - 4.200 uIU/mL Final     Free T4   Date Value Ref Range Status   05/31/2022 1.02 0.93 - 1.70 ng/dL Final      Pain Management Panel           No data to display               Imaging Results:  XR Chest PA & Lateral (In Office)    Result Date: 5/15/2024  Impression: 1.  Atelectasis or scarring in the lingula/left lower lobe. 2.  Prominence of markings within the interstitium of the mid to lower lungs that might be reflective of the respiratory tract viral infection/bronchitis. 3.  Scoliosis   Electronically Signed By-Orlando Zarate MD On:5/15/2024 4:19 PM      Current Medications:   Current Outpatient Medications   Medication Sig Dispense Refill    albuterol sulfate  (90 Base) MCG/ACT inhaler Inhale 2 puffs Every 4 (Four) Hours As Needed for Wheezing. 8.5 g 2    budesonide-formoterol (Symbicort) 160-4.5 MCG/ACT inhaler Inhale 2 puffs every 12 hours. Use with spacer. Rinse mouth after each use 10.2 g 11    busPIRone (BUSPAR) 5 MG tablet Take 1 tablet by mouth 2 (Two) Times a Day As Needed (Anxiety). 60 tablet 1    dicyclomine (BENTYL) 20 MG tablet Take 1 tablet by mouth Every 6 (Six) Hours. 90 tablet 3    escitalopram (Lexapro) 10 MG tablet Take 1 tablet by mouth Daily. 30 tablet 1    fexofenadine (Allegra Allergy) 180 MG  tablet Take 1 tablet by mouth Daily. 90 tablet 0    ibuprofen (ADVIL,MOTRIN) 800 MG tablet Take 1 tablet by mouth Every 6 (Six) Hours As Needed for Mild Pain. 90 tablet 0    montelukast (SINGULAIR) 10 MG tablet Take one tablet daily at bedtime 30 tablet 11    pantoprazole (Protonix) 40 MG EC tablet Take 1 tablet by mouth Daily. 90 tablet 1    traZODone (DESYREL) 50 MG tablet Take 1-2 tablets by mouth every night at bedtime. (Patient taking differently: Take 1-2 tablets by mouth every night at bedtime. As needed) 60 tablet 1    Azelastine HCl 137 MCG/SPRAY solution Instill 1-2 sprays each nostril twice a day as needed for runny nose, sneezing or increased nasal allergy symptoms. 30 mL 0    fluticasone (FLONASE) 50 MCG/ACT nasal spray instill 2 sprays in each nostril once daily 16 g 2    Phentermine-Topiramate (Qsymia) 3.75-23 MG capsule sustained-release 24 hr Take 1 tablet by mouth Daily for 14 days. 14 capsule 0    Phentermine-Topiramate (Qsymia) 7.5-46 MG capsule sustained-release 24 hr Take 1 tablet by mouth Every Morning for 30 days. 30 capsule 1     No current facility-administered medications for this visit.     Problem List:  Patient Active Problem List   Diagnosis    Class 3 severe obesity due to excess calories without serious comorbidity with body mass index (BMI) of 50.0 to 59.9 in adult    Syncope    GERD without esophagitis    Asthma    Heartburn    Depression with anxiety    Hypersomnolence    Migraine with aura and without status migrainosus, not intractable    History of colitis    Altered bowel habits    Diarrhea    Generalized abdominal pain     Allergy:   Allergies   Allergen Reactions    Doxycycline Anaphylaxis     Shortness of air    Morphine Hallucinations    Adhesive Tape Rash     CANNOT USE EKG ADHESIVE STRIPS/RASH & ITCHING    Latex Rash      Discontinued Medications:  Medications Discontinued During This Encounter   Medication Reason    busPIRone (BUSPAR) 5 MG tablet Reorder    escitalopram  (Lexapro) 10 MG tablet Reorder       PLAN:   Presentation seems most consistent with DSM-V criteria for:  Diagnoses and all orders for this visit:    1. Severe episode of recurrent major depressive disorder, without psychotic features (Primary)  -     escitalopram (Lexapro) 10 MG tablet; Take 1 tablet by mouth Daily.  Dispense: 30 tablet; Refill: 1    2. Generalized anxiety disorder  -     busPIRone (BUSPAR) 5 MG tablet; Take 1 tablet by mouth 2 (Two) Times a Day As Needed (Anxiety).  Dispense: 60 tablet; Refill: 1  -     escitalopram (Lexapro) 10 MG tablet; Take 1 tablet by mouth Daily.  Dispense: 30 tablet; Refill: 1    3. Post traumatic stress disorder (PTSD)  -     escitalopram (Lexapro) 10 MG tablet; Take 1 tablet by mouth Daily.  Dispense: 30 tablet; Refill: 1    4. Primary insomnia          Continue Lexapro 10 mg daily  Continue BuSpar 5 mg twice daily as needed for anxiety  Continue trazodone 50 to 100 mg at bedtime  Follow-up 2 months  Medication Education:   LEXAPRO (ESCITALOPRAM)  Risks, benefits, alternatives discussed with patient including GI upset, nausea vomiting diarrhea, theoretical decrease of seizure threshold predisposing the patient to a slightly higher seizure risk, headaches, sexual dysfunction, serotonin syndrome, bleeding risk.  After discussion of these risks and benefits, the patient voiced understanding and agreed to proceed.  BUSPAR (BUSPIRONE) Risks, benefits, alternatives discussed with patient including nausea, GI upset, mild sedation, falls risk.  After discussion of these risks and benefits, the patient voiced understanding and agreed to proceed.  DESYREL (TRAZODONE) Risks, benefits, side effects discussed with patient including GI upset, sedation, dizziness/falls risk, grogginess the following day, prolongation of the QTc interval.  After discussion of these risks and benefits, the patient voiced understanding and agreed to proceed.   Medications:   New Medications Ordered This  Visit   Medications    busPIRone (BUSPAR) 5 MG tablet     Sig: Take 1 tablet by mouth 2 (Two) Times a Day As Needed (Anxiety).     Dispense:  60 tablet     Refill:  1    escitalopram (Lexapro) 10 MG tablet     Sig: Take 1 tablet by mouth Daily.     Dispense:  30 tablet     Refill:  1      VENKATA reviewed.   Discussed medication options and treatment plan of prescribed medication as well as the risks, benefits, and side effects.  Patient is agreeable to call the office with any worsening of symptoms or onset of side effects.   Patient is agreeable to call 911 or go to the nearest ER should he/she begin having SI/HI.   Patient acknowledged, is agreeable to continue with current treatment plan, and was educated on the importance of compliance with treatment and follow-up appointments.  Addressed all questions and concerns.     Psychotherapy:      Psychotherapy time 40 minutes.  This time is exclusive to the therapy session and separate from the time spent on activities used to meet the criteria for the E/M service (history, exam, medical decision-making).  Goal is to strengthen defenses, promote problems solving, restore adaptive functioning, and provide symptom relief. Esteem building was enhanced through praise, reassurance, normalizing and encouragement. Coping skills were enhanced to build distress tolerance skills and emotional regulation. Allowed patient to freely discuss issues without interruption or judgement with unconditional positive regard, active listening skills, and empathy. Provided a safe, confidential environment to facilitate the development of a positive therapeutic relationship and encourage open, honest communication. Assisted patient in processing session content, acknowledged and normalized patient’s thoughts, feelings, and concerns by utilizing a person-centered approach in efforts to build appropriate rapport and a positive therapeutic relationship with open and honest communication. Plan to  continue supportive psychotherapy in next appointment to provide symptom relief.    Functional status: Moderate symptoms OR moderate difficulty in social occupational or social functioning (51-60)  Prognosis: Fair with expectation for some response to treatment  Progress: worsening      Follow-up: Return in about 3 months (around 11/6/2024).     This document has been electronically signed by LILLIAM Stanford  August 13, 2024 10:54 EDT  Please note that portions of this note were completed with a voice recognition program.  Copied text in this note has been reviewed and is accurate as of 08/13/24

## 2024-08-06 NOTE — PATIENT INSTRUCTIONS
1.  Please return to clinic at your next scheduled visit.  Please contact the clinic ([phone]) at least 24 hours prior in the event you need to cancel.  2.  Do no harm to yourself or others.    3.  Avoid alcohol and drugs.    4.  Take all medications as prescribed.  Please contact the clinic with any concerns. If you are in need of medication refills, please call the clinic at [phone].    5. Should you want to get in touch with your provider, LILLIAM Stanford, please contact the office ([phone]), and staff will be able to page Kiersten directly.  6. In the event you have personal crisis, contact the following crisis numbers: Suicide Prevention Hotline 1-613.106.7266; [facility] Helpline 4-997-383-LPTP; [facility] Room [phone]; text HELLO to 624991; or 005.     SPECIFIC RECOMMENDATIONS:     1.      Medications discussed at this encounter:     New Medications Ordered This Visit   Medications    busPIRone (BUSPAR) 5 MG tablet     Sig: Take 1 tablet by mouth 2 (Two) Times a Day As Needed (Anxiety).     Dispense:  60 tablet     Refill:  1    escitalopram (Lexapro) 10 MG tablet     Sig: Take 1 tablet by mouth Daily.     Dispense:  30 tablet     Refill:  1                       2.      Psychotherapy recommendations: We will continue therapy at future visits.     3.     Return to clinic: Return in about 3 months (around 11/6/2024).

## 2024-08-07 ENCOUNTER — OFFICE VISIT (OUTPATIENT)
Dept: BARIATRICS/WEIGHT MGMT | Facility: CLINIC | Age: 39
End: 2024-08-07
Payer: COMMERCIAL

## 2024-08-07 VITALS
BODY MASS INDEX: 48.82 KG/M2 | WEIGHT: 293 LBS | SYSTOLIC BLOOD PRESSURE: 130 MMHG | HEART RATE: 66 BPM | DIASTOLIC BLOOD PRESSURE: 55 MMHG | HEIGHT: 65 IN | TEMPERATURE: 98.4 F

## 2024-08-07 DIAGNOSIS — K21.9 GERD WITHOUT ESOPHAGITIS: Primary | ICD-10-CM

## 2024-08-07 DIAGNOSIS — E66.01 CLASS 3 SEVERE OBESITY DUE TO EXCESS CALORIES WITHOUT SERIOUS COMORBIDITY WITH BODY MASS INDEX (BMI) OF 50.0 TO 59.9 IN ADULT: ICD-10-CM

## 2024-08-07 PROCEDURE — 99214 OFFICE O/P EST MOD 30 MIN: CPT | Performed by: NURSE PRACTITIONER

## 2024-08-07 RX ORDER — FLUTICASONE PROPIONATE 50 MCG
2 SPRAY, SUSPENSION (ML) NASAL DAILY
Qty: 16 G | Refills: 2 | Status: SHIPPED | OUTPATIENT
Start: 2024-08-07

## 2024-08-07 RX ORDER — PHENTERMINE AND TOPIRAMATE 3.75; 23 MG/1; MG/1
1 CAPSULE, EXTENDED RELEASE ORAL DAILY
Qty: 14 CAPSULE | Refills: 0 | Status: SHIPPED | OUTPATIENT
Start: 2024-08-07 | End: 2024-08-21

## 2024-08-07 RX ORDER — PHENTERMINE AND TOPIRAMATE 7.5; 46 MG/1; MG/1
1 CAPSULE, EXTENDED RELEASE ORAL EVERY MORNING
Qty: 30 CAPSULE | Refills: 1 | Status: SHIPPED | OUTPATIENT
Start: 2024-08-07 | End: 2024-09-06

## 2024-08-07 NOTE — PROGRESS NOTES
[facility]  [address]  [facility] [address]  [phone]  [address]  [facility] [id number] [phone]  Dept: [phone]  8/7/2024      Jessie Romero.  [id number]  [id number]  1985  female      Chief Complaint   Patient presents with    Follow-up     Follow up [facility] #4       [facility]:   Jessie Romero presents today for follow up today for provider supervised medical weight loss.    HPI:   Today's weight is (!) 140 kg (309 lb) pounds, today's BMI is Body mass index is 51.48 kg/m²., she has a  gain of 8 pounds since the last visit.     Jessie Romero denies nausea, vomiting, reflux and reports that she is frustrated and unsure of where her 8 pound weight gain came from.  She did undergo sleep study recently but failed CPAP and is planning on following up with sleep medicine to discuss next steps to get her sleep apnea treated she also underwent EGD which did show small hiatal hernia but she reports her symptoms are well-controlled on Protonix and no esophagitis was noted.      Diet and Exercise: Diet history reviewed and discussed with the patient. Weight loss/gains to date discussed with the patient. The patient states they are eating 40-50 grams of protein per day. She reports eating 32 meals per day, a typical portion size of 2/3 cup, eating 2 snacks per day, drinking 6-7 or more 8-oz. glasses of water per day.  She is cut her soda intake back to 4 sodas 3 days a week but is planning on cutting them out completely in the near future.  She reports that she does not often need to solid dinner and is not hungry in the evenings      Review of Systems   Constitutional:  Positive for appetite change. Negative for fatigue and unexpected weight change.   HENT: Negative.     Eyes: Negative.    Respiratory: Negative.     Cardiovascular: Negative.  Negative for leg swelling.    Gastrointestinal:  Negative for abdominal distention, abdominal pain, constipation, diarrhea, nausea and vomiting.   Genitourinary:  Negative for difficulty urinating, frequency and urgency.   Musculoskeletal:  Negative for back pain.   Skin: Negative.    Psychiatric/Behavioral: Negative.     All other systems reviewed and are negative.      Patient Active Problem List   Diagnosis    Class 3 severe obesity due to excess calories without serious comorbidity with body mass index (BMI) of 50.0 to 59.9 in adult    Syncope    GERD without esophagitis    Asthma    Heartburn    Depression with anxiety    Hypersomnolence    Migraine with aura and without status migrainosus, not intractable    History of colitis    Altered bowel habits    Diarrhea    Generalized abdominal pain       Past Medical History:   Diagnosis Date    Abnormal Pap smear of cervix     ADHD     Adrenal mass     AT AGE 26    Allergic 2000    Moved to [place]    Allergic rhinitis     Anemia     Anxiety     Arthritis     Asthma     Depression     GERD (gastroesophageal reflux disease)     Headache 2002    Heart murmur     History of heart attack 05/03/2024    History of night terrors     Hyperlipidemia     Migraine     Myocardial infarction 03/2010    Ovarian cyst     Sleep apnea     Tear of meniscus of knee        The following portions of the patient's history were reviewed and updated as appropriate: allergies, current medications, past family history, past medical history, past social history, past surgical history, and problem list.    Vitals:    08/07/24 1533   BP: 130/55   Pulse: 66   Temp: 98.4 °F (36.9 °C)       Physical Exam  Vitals and nursing note reviewed.   Constitutional:       Appearance: She is well-developed.   Neck:      Thyroid: No thyromegaly.   Cardiovascular:      Rate and Rhythm: Normal rate.   Pulmonary:      Effort: Pulmonary effort is normal. No respiratory distress.   Abdominal:      Palpations: Abdomen is soft.    Musculoskeletal:         General: No tenderness.   Skin:     General: Skin is warm and dry.   Neurological:      Mental Status: She is alert.   Psychiatric:         Behavior: Behavior normal.         Assessment:   The patient well with making healthy dietary changes but frustrated with weight gain.     Plan:   Cut out soda and any liquid carbohydrate intake, increase protein supplementation especially in the evenings if she does not eat dinner discussed different options for protein shakes that are low-carb high in protein.  We discussed how to read labels looking at total carbs and protein.  Discussed options for medical weight loss she does not feel that she can afford compounded medications and as [facility] insurance no longer covers injectable GLP-1 medications for weight we did discuss oral medication therapies she does have a recent EKG within the last year which is stable.  Pressure and heart rate are normal.    Decided to proceed with Qsymia and discussed dosing and how to taper this medication up as well as common adverse effects and to avoid pregnancy while taking this medication    Encouraged patient to be sure to get plenty of lean protein per day through small frequent meals all with a protein source.   Activity restrictions: none.   Recommended patient be sure to get at least 70 grams of protein per day by eating small, frequent meals all with high lean protein choices. Be sure to limit/cut back on daily carbohydrate intake. Discussed with the patient the recommended amount of water per day to intake- half of body weight in ounces. Reviewed vitamin requirements. Be sure to do routine exercise, 150 minutes per week minimum, including both cardio and strength training.     Instructions / Recommendations: dietary counseling recommended, recommended a daily protein intake of  grams, vitamin supplement(s) recommended, recommended exercising at least 150 minutes per week, behavior modifications  recommended and instructed to call the office for concerns, questions, or problems.     The patient was instructed to follow up in 2 months with myself and in one month with the dietitian .    Total time spent during this encounter today was 35 minutes

## 2024-08-08 ENCOUNTER — TELEPHONE (OUTPATIENT)
Dept: SLEEP MEDICINE | Facility: HOSPITAL | Age: 39
End: 2024-08-08
Payer: COMMERCIAL

## 2024-08-22 ENCOUNTER — OFFICE VISIT (OUTPATIENT)
Dept: CARDIOLOGY | Facility: CLINIC | Age: 39
End: 2024-08-22
Payer: COMMERCIAL

## 2024-08-22 VITALS
SYSTOLIC BLOOD PRESSURE: 120 MMHG | DIASTOLIC BLOOD PRESSURE: 87 MMHG | BODY MASS INDEX: 48.82 KG/M2 | HEART RATE: 73 BPM | HEIGHT: 65 IN | WEIGHT: 293 LBS

## 2024-08-22 DIAGNOSIS — R55 SYNCOPE, UNSPECIFIED SYNCOPE TYPE: Primary | ICD-10-CM

## 2024-08-22 PROCEDURE — 99213 OFFICE O/P EST LOW 20 MIN: CPT | Performed by: INTERNAL MEDICINE

## 2024-08-22 NOTE — ASSESSMENT & PLAN NOTE
Patient with symptoms sound orthostatic or vasovagal in nature again encourage fluid hydration, compression stockings and countermeasures.  She seems to be doing better symptomatically we will add off any pharmacologic treatment

## 2024-08-22 NOTE — PROGRESS NOTES
Chief Complaint  Orthostatic hypotension    Subjective    Patient has been doing better symptomatically still had some presyncopal episodes about the month ago which her last 1 occurred while standing she get mildly sweaty had a fullness in her chest and felt lightheaded the symptoms resolved sitting down.  Past Medical History:   Diagnosis Date    Abnormal ECG 3 month ago    Abnormal Pap smear of cervix     ADHD     Adrenal mass     AT AGE 26    Allergic 2000    Moved to [place]    Allergic rhinitis     Anemia     Anxiety     Arthritis     Asthma     Depression     GERD (gastroesophageal reflux disease)     Headache 2002    Heart murmur     History of heart attack 05/03/2024    History of night terrors     Hyperlipidemia     Migraine     Myocardial infarction 03/2010    Ovarian cyst     Sleep apnea     Tear of meniscus of knee          Current Outpatient Medications:     albuterol sulfate  (90 Base) MCG/ACT inhaler, Inhale 2 puffs Every 4 (Four) Hours As Needed for Wheezing., Disp: 8.5 g, Rfl: 2    Azelastine HCl 137 MCG/SPRAY solution, Instill 1-2 sprays each nostril twice a day as needed for runny nose, sneezing or increased nasal allergy symptoms., Disp: 30 mL, Rfl: 0    budesonide-formoterol (Symbicort) 160-4.5 MCG/ACT inhaler, Inhale 2 puffs every 12 hours. Use with spacer. Rinse mouth after each use, Disp: 10.2 g, Rfl: 11    busPIRone (BUSPAR) 5 MG tablet, Take 1 tablet by mouth 2 (Two) Times a Day As Needed (Anxiety)., Disp: 60 tablet, Rfl: 1    dicyclomine (BENTYL) 20 MG tablet, Take 1 tablet by mouth Every 6 (Six) Hours., Disp: 90 tablet, Rfl: 3    escitalopram (Lexapro) 10 MG tablet, Take 1 tablet by mouth Daily., Disp: 30 tablet, Rfl: 1    fexofenadine (Allegra Allergy) 180 MG tablet, Take 1 tablet by mouth Daily., Disp: 90 tablet, Rfl: 0    fluticasone (FLONASE) 50 MCG/ACT nasal spray, instill 2 sprays in each nostril once daily, Disp: 16 g, Rfl: 2    ibuprofen (ADVIL,MOTRIN) 800 MG tablet, Take  1 tablet by mouth Every 6 (Six) Hours As Needed for Mild Pain., Disp: 90 tablet, Rfl: 0    montelukast (SINGULAIR) 10 MG tablet, Take one tablet daily at bedtime, Disp: 30 tablet, Rfl: 11    pantoprazole (Protonix) 40 MG EC tablet, Take 1 tablet by mouth Daily., Disp: 90 tablet, Rfl: 1    traZODone (DESYREL) 50 MG tablet, Take 1-2 tablets by mouth every night at bedtime. (Patient taking differently: Take 1-2 tablets by mouth every night at bedtime. As needed), Disp: 60 tablet, Rfl: 1    Medications Discontinued During This Encounter   Medication Reason    Phentermine-Topiramate (Qsymia) 3.75-23 MG capsule sustained-release 24 hr *Therapy completed    Phentermine-Topiramate (Qsymia) 7.5-46 MG capsule sustained-release 24 hr *Therapy completed     Allergies   Allergen Reactions    Doxycycline Anaphylaxis     Shortness of air    Morphine Hallucinations    Adhesive Tape Rash     CANNOT USE EKG ADHESIVE STRIPS/RASH & ITCHING    Latex Rash        Social History     Tobacco Use    Smoking status: Former     Current packs/day: 0.00     Average packs/day: 2.0 packs/day for 10.0 years (20.0 ttl pk-yrs)     Types: Cigarettes     Start date: 2017     Quit date: 2020     Years since quittin.6     Passive exposure: Past    Smokeless tobacco: Never   Vaping Use    Vaping status: Never Used   Substance Use Topics    Alcohol use: Not Currently     Alcohol/week: 3.0 standard drinks of alcohol     Types: 3 Glasses of wine per week     Comment: OCCASIONAL/SOCIAL    Drug use: Never       Family History   Problem Relation Age of Onset    Hypertension Mother     Hyperlipidemia Mother     Thyroid disease Mother     Scoliosis Mother     Anemia Mother     Alcohol abuse Father         Sober 25 years now    No Known Problems Sister     No Known Problems Brother     Alcohol abuse Maternal Aunt         Dead    No Known Problems Maternal Uncle     No Known Problems Paternal Aunt     No Known Problems Paternal Uncle     Arthritis  "Maternal Grandmother     COPD Maternal Grandmother         Smoker    Restless legs syndrome Maternal Grandmother     Diabetes Maternal Grandfather     Emphysema Paternal Grandmother     COPD Paternal Grandfather         Smoker    No Known Problems Cousin     Ovarian cancer Maternal Great-Grandmother     Gallbladder disease Other         gallbladder problems run in the family    Colon cancer Other         colon cancer-- great great aunt    ADD / ADHD Neg Hx     Anxiety disorder Neg Hx     Bipolar disorder Neg Hx     Dementia Neg Hx     Depression Neg Hx     Drug abuse Neg Hx     OCD Neg Hx     Paranoid behavior Neg Hx     Schizophrenia Neg Hx     Seizures Neg Hx     Self-Injurious Behavior  Neg Hx     Suicide Attempts Neg Hx     Breast cancer Neg Hx     Uterine cancer Neg Hx         Objective     /87   Pulse 73   Ht 165 cm (64.96\")   Wt (!) 138 kg (303 lb 12.8 oz)   BMI 50.62 kg/m²       Physical Exam    General Appearance:   no acute distress  Alert and oriented x3  HENT:   lips not cyanotic  Atraumatic  Neck:  No jvd   supple  Respiratory:  no respiratory distress  normal breath sounds  no rales  Cardiovascular:  Regular rate and rhythm  no S3, no S4   no murmur  no rub  Extremities  No cyanosis  lower extremity edema: none    Skin:   warm, dry  No rashes      Result Review :     proBNP   Date Value Ref Range Status   11/18/2023 51.3 0.0 - 450.0 pg/mL Final     CMP          11/18/2023    18:23 5/30/2024    14:51 7/12/2024    07:01   CMP   Glucose 137  78  93    BUN 13  17  13    Creatinine 0.72  0.80  0.63    EGFR 110.6  97.5  117.3    Sodium 137  139  137    Potassium 3.5  4.1  3.9    Chloride 102  103  104    Calcium 8.9  9.1  8.7    Total Protein 6.9  6.8  6.8    Albumin 4.3  4.2  4.0    Globulin 2.6  2.6  2.8    Total Bilirubin 0.2  <0.2  0.2    Alkaline Phosphatase 83  97  86    AST (SGOT) 13  14  14    ALT (SGPT) 10  14  11    Albumin/Globulin Ratio 1.7  1.6  1.4    BUN/Creatinine Ratio 18.1  21.3  " "20.6    Anion Gap 9.0  11.6  11.1      CBC w/diff          11/18/2023    18:23 5/30/2024    14:51 7/12/2024    07:01   CBC w/Diff   WBC 9.18  10.54  8.03    RBC 4.47  4.48  4.27    Hemoglobin 11.8  11.0  10.4    Hematocrit 38.0  35.3  33.7    MCV 85.0  78.8  78.9    MCH 26.4  24.6  24.4    MCHC 31.1  31.2  30.9    RDW 14.6  14.5  14.3    Platelets 327  377  347    Neutrophil Rel % 63.2  60.5     Immature Granulocyte Rel % 0.2  0.4     Lymphocyte Rel % 29.0  26.9     Monocyte Rel % 5.8  8.9     Eosinophil Rel % 1.7  2.8     Basophil Rel % 0.1  0.5        Lab Results   Component Value Date    TSH 1.190 05/30/2024      Lab Results   Component Value Date    FREET4 1.02 05/31/2022      No results found for: \"DDIMERQUANT\"  Magnesium   Date Value Ref Range Status   11/18/2023 2.0 1.6 - 2.6 mg/dL Final      No results found for: \"DIGOXIN\"   Lab Results   Component Value Date    TROPONINT <6 11/18/2023           Lipid Panel          5/30/2024    14:51   Lipid Panel   Total Cholesterol 203    Triglycerides 119    HDL Cholesterol 50    VLDL Cholesterol 21    LDL Cholesterol  132    LDL/HDL Ratio 2.58      No results found for: \"POCTROP\"    Results for orders placed during the hospital encounter of 02/21/23    Adult Transthoracic Echo Complete W/ Cont if Necessary Per Protocol    Interpretation Summary    The study is technically difficult for diagnosis.    Left ventricular systolic function is normal. Estimated left ventricular EF = 55%    Left ventricular diastolic function was normal.                 Diagnoses and all orders for this visit:    1. Syncope, unspecified syncope type (Primary)  Assessment & Plan:  Patient with symptoms sound orthostatic or vasovagal in nature again encourage fluid hydration, compression stockings and countermeasures.  She seems to be doing better symptomatically we will add off any pharmacologic treatment              Follow Up     Return in about 1 year (around 8/22/2025) for Follow with " Laura Cooper, EKG with F/U.          Patient was given instructions and counseling regarding her condition or for health maintenance advice. Please see specific information pulled into the AVS if appropriate.

## 2024-09-09 ENCOUNTER — HOSPITAL ENCOUNTER (EMERGENCY)
Facility: HOSPITAL | Age: 39
Discharge: HOME | End: 2024-09-09
Attending: EMERGENCY MEDICINE | Admitting: EMERGENCY MEDICINE
Payer: COMMERCIAL

## 2024-09-09 ENCOUNTER — APPOINTMENT (OUTPATIENT)
Dept: GENERAL RADIOLOGY | Facility: HOSPITAL | Age: 39
End: 2024-09-09
Payer: COMMERCIAL

## 2024-09-09 VITALS
DIASTOLIC BLOOD PRESSURE: 86 MMHG | BODY MASS INDEX: 48.82 KG/M2 | TEMPERATURE: 97.9 F | HEART RATE: 75 BPM | OXYGEN SATURATION: 96 % | HEIGHT: 65 IN | RESPIRATION RATE: 18 BRPM | SYSTOLIC BLOOD PRESSURE: 125 MMHG | WEIGHT: 293 LBS

## 2024-09-09 DIAGNOSIS — J45.21 MILD INTERMITTENT ASTHMA WITH EXACERBATION: ICD-10-CM

## 2024-09-09 DIAGNOSIS — R06.00 DYSPNEA, UNSPECIFIED TYPE: Primary | ICD-10-CM

## 2024-09-09 LAB
ALBUMIN SERPL-MCNC: 4 G/DL (ref 3.5–5.2)
ALBUMIN/GLOB SERPL: 1.4 G/DL
ALP SERPL-CCNC: 80 U/L (ref 39–117)
ALT SERPL W P-5'-P-CCNC: 11 U/L (ref 1–33)
ANION GAP SERPL CALCULATED.3IONS-SCNC: 11.5 MMOL/L (ref 5–15)
AST SERPL-CCNC: 14 U/L (ref 1–32)
BASOPHILS # BLD AUTO: 0.07 10*3/MM3 (ref 0–0.2)
BASOPHILS NFR BLD AUTO: 0.7 % (ref 0–1.5)
BILIRUB SERPL-MCNC: <0.2 MG/DL (ref 0–1.2)
BUN SERPL-MCNC: 18 MG/DL (ref 6–20)
BUN/CREAT SERPL: 24.7 (ref 7–25)
CALCIUM SPEC-SCNC: 9.1 MG/DL (ref 8.6–10.5)
CHLORIDE SERPL-SCNC: 102 MMOL/L (ref 98–107)
CO2 SERPL-SCNC: 24.5 MMOL/L (ref 22–29)
CREAT SERPL-MCNC: 0.73 MG/DL (ref 0.57–1)
DEPRECATED RDW RBC AUTO: 43.9 FL (ref 37–54)
EGFRCR SERPLBLD CKD-EPI 2021: 108.8 ML/MIN/1.73
EOSINOPHIL # BLD AUTO: 0.51 10*3/MM3 (ref 0–0.4)
EOSINOPHIL NFR BLD AUTO: 5.2 % (ref 0.3–6.2)
ERYTHROCYTE [DISTWIDTH] IN BLOOD BY AUTOMATED COUNT: 15.8 % (ref 12.3–15.4)
FLUAV SUBTYP SPEC NAA+PROBE: NOT DETECTED
FLUBV RNA ISLT QL NAA+PROBE: NOT DETECTED
GLOBULIN UR ELPH-MCNC: 2.9 GM/DL
GLUCOSE SERPL-MCNC: 80 MG/DL (ref 65–99)
HCT VFR BLD AUTO: 34.9 % (ref 34–46.6)
HGB BLD-MCNC: 10.7 G/DL (ref 12–15.9)
HOLD SPECIMEN: NORMAL
HOLD SPECIMEN: NORMAL
IMM GRANULOCYTES # BLD AUTO: 0.04 10*3/MM3 (ref 0–0.05)
IMM GRANULOCYTES NFR BLD AUTO: 0.4 % (ref 0–0.5)
LYMPHOCYTES # BLD AUTO: 2.87 10*3/MM3 (ref 0.7–3.1)
LYMPHOCYTES NFR BLD AUTO: 29.4 % (ref 19.6–45.3)
MCH RBC QN AUTO: 23.8 PG (ref 26.6–33)
MCHC RBC AUTO-ENTMCNC: 30.7 G/DL (ref 31.5–35.7)
MCV RBC AUTO: 77.6 FL (ref 79–97)
MONOCYTES # BLD AUTO: 0.81 10*3/MM3 (ref 0.1–0.9)
MONOCYTES NFR BLD AUTO: 8.3 % (ref 5–12)
NEUTROPHILS NFR BLD AUTO: 5.46 10*3/MM3 (ref 1.7–7)
NEUTROPHILS NFR BLD AUTO: 56 % (ref 42.7–76)
NRBC BLD AUTO-RTO: 0 /100 WBC (ref 0–0.2)
NT-PROBNP SERPL-MCNC: 65.6 PG/ML (ref 0–450)
PLATELET # BLD AUTO: 340 10*3/MM3 (ref 140–450)
PMV BLD AUTO: 10.7 FL (ref 6–12)
POTASSIUM SERPL-SCNC: 3.6 MMOL/L (ref 3.5–5.2)
PROT SERPL-MCNC: 6.9 G/DL (ref 6–8.5)
QT INTERVAL: 424 MS
QTC INTERVAL: 430 MS
RBC # BLD AUTO: 4.5 10*6/MM3 (ref 3.77–5.28)
RSV RNA NPH QL NAA+NON-PROBE: NOT DETECTED
SARS-COV-2 RNA RESP QL NAA+PROBE: NOT DETECTED
SODIUM SERPL-SCNC: 138 MMOL/L (ref 136–145)
TROPONIN T SERPL HS-MCNC: <6 NG/L
WBC NRBC COR # BLD AUTO: 9.76 10*3/MM3 (ref 3.4–10.8)
WHOLE BLOOD HOLD COAG: NORMAL
WHOLE BLOOD HOLD SPECIMEN: NORMAL

## 2024-09-09 PROCEDURE — 94640 AIRWAY INHALATION TREATMENT: CPT

## 2024-09-09 PROCEDURE — 93005 ELECTROCARDIOGRAM TRACING: CPT

## 2024-09-09 PROCEDURE — 80053 COMPREHEN METABOLIC PANEL: CPT

## 2024-09-09 PROCEDURE — 63710000001 PREDNISONE PER 5 MG

## 2024-09-09 PROCEDURE — 83880 ASSAY OF NATRIURETIC PEPTIDE: CPT

## 2024-09-09 PROCEDURE — 94799 UNLISTED PULMONARY SVC/PX: CPT

## 2024-09-09 PROCEDURE — 99284 EMERGENCY DEPT VISIT MOD MDM: CPT

## 2024-09-09 PROCEDURE — 84484 ASSAY OF TROPONIN QUANT: CPT

## 2024-09-09 PROCEDURE — 36415 COLL VENOUS BLD VENIPUNCTURE: CPT

## 2024-09-09 PROCEDURE — 93005 ELECTROCARDIOGRAM TRACING: CPT | Performed by: EMERGENCY MEDICINE

## 2024-09-09 PROCEDURE — 85025 COMPLETE CBC W/AUTO DIFF WBC: CPT

## 2024-09-09 PROCEDURE — 71045 X-RAY EXAM CHEST 1 VIEW: CPT

## 2024-09-09 PROCEDURE — 87637 SARSCOV2&INF A&B&RSV AMP PRB: CPT | Performed by: EMERGENCY MEDICINE

## 2024-09-09 RX ORDER — METHYLPREDNISOLONE 4 MG
TABLET, DOSE PACK ORAL
Qty: 21 TABLET | Refills: 0 | Status: SHIPPED | OUTPATIENT
Start: 2024-09-09 | End: 2024-09-19

## 2024-09-09 RX ORDER — SODIUM CHLORIDE 0.9 % (FLUSH) 0.9 %
10 SYRINGE (ML) INJECTION AS NEEDED
Status: DISCONTINUED | OUTPATIENT
Start: 2024-09-09 | End: 2024-09-09 | Stop reason: HOSPADM

## 2024-09-09 RX ORDER — PREDNISONE 10 MG/1
20 TABLET ORAL ONCE
Status: COMPLETED | OUTPATIENT
Start: 2024-09-09 | End: 2024-09-09

## 2024-09-09 RX ORDER — IPRATROPIUM BROMIDE AND ALBUTEROL SULFATE 2.5; .5 MG/3ML; MG/3ML
3 SOLUTION RESPIRATORY (INHALATION) ONCE
Status: COMPLETED | OUTPATIENT
Start: 2024-09-09 | End: 2024-09-09

## 2024-09-09 RX ADMIN — IPRATROPIUM BROMIDE AND ALBUTEROL SULFATE 3 ML: .5; 3 SOLUTION RESPIRATORY (INHALATION) at 17:55

## 2024-09-09 RX ADMIN — PREDNISONE 20 MG: 10 TABLET ORAL at 17:50

## 2024-09-09 NOTE — ED PROVIDER NOTES
Time: 3:31 PM EDT  Date of encounter:  9/9/2024  Independent Historian/Clinical History and Information was obtained by:   Patient    History is limited by: N/A    Chief Complaint   Patient presents with    Shortness of Breath         History of Present Illness:  Patient is a 38 y.o. year old female who presents to the emergency department for evaluation of dyspnea.  Patient been having dyspnea and chest tightness this morning.  She states that her symptoms are coming and going to where she feels like she is breathing through a straw.  Patient has tried using Benadryl and inhalers at home without improvement.  (Provider in triage, Maximo Moy PA-C)    Patient Care Team  Primary Care Provider: Hanna Pereira MD    Past Medical History:     Allergies   Allergen Reactions    Doxycycline Anaphylaxis     Shortness of air    Morphine Hallucinations    Adhesive Tape Rash     CANNOT USE EKG ADHESIVE STRIPS/RASH & ITCHING    Latex Rash     Past Medical History:   Diagnosis Date    Abnormal ECG 3 month ago    Abnormal Pap smear of cervix     ADHD     Adrenal mass     AT AGE 26    Allergic 2000    Moved to [place]    Allergic rhinitis     Anemia     Anxiety     Arthritis     Asthma     Depression     GERD (gastroesophageal reflux disease)     Headache 2002    Heart murmur     History of heart attack 05/03/2024    History of night terrors     Hyperlipidemia     Migraine     Myocardial infarction 03/2010    Ovarian cyst     Sleep apnea     Tear of meniscus of knee      Past Surgical History:   Procedure Laterality Date    ADRENAL GLAND SURGERY Left 01/01/2010    [facility] [place] [facility]O    COLONOSCOPY N/A 07/08/2024    Procedure: COLONOSCOPY;  Surgeon: Monroe Huang MD;  Location: [facility] ENDOSCOPY;  Service: Gastroenterology;  Laterality: N/A;  NORMAL COLONOSCOPY, NORMAL TI    ENDOSCOPY N/A 07/08/2024    Procedure: ESOPHAGOGASTRODUODENOSCOPY WITH BIOPSIES;  Surgeon: Monroe Huang MD;   Location: [facility] ENDOSCOPY;  Service: Gastroenterology;  Laterality: N/A;  HIATAL HERNIA    HYSTEROSCOPY LEEP BIOPSY  2011     Family History   Problem Relation Age of Onset    Hypertension Mother     Hyperlipidemia Mother     Thyroid disease Mother     Scoliosis Mother     Anemia Mother     Alcohol abuse Father         Sober 25 years now    No Known Problems Sister     No Known Problems Brother     Alcohol abuse Maternal Aunt         Dead    No Known Problems Maternal Uncle     No Known Problems Paternal Aunt     No Known Problems Paternal Uncle     Arthritis Maternal Grandmother     COPD Maternal Grandmother         Smoker    Restless legs syndrome Maternal Grandmother     Diabetes Maternal Grandfather     Emphysema Paternal Grandmother     COPD Paternal Grandfather         Smoker    No Known Problems Cousin     Ovarian cancer Maternal Great-Grandmother     Gallbladder disease Other         gallbladder problems run in the family    Colon cancer Other         colon cancer-- great great aunt    ADD / ADHD Neg Hx     Anxiety disorder Neg Hx     Bipolar disorder Neg Hx     Dementia Neg Hx     Depression Neg Hx     Drug abuse Neg Hx     OCD Neg Hx     Paranoid behavior Neg Hx     Schizophrenia Neg Hx     Seizures Neg Hx     Self-Injurious Behavior  Neg Hx     Suicide Attempts Neg Hx     Breast cancer Neg Hx     Uterine cancer Neg Hx        Home Medications:  Prior to Admission medications    Medication Sig Start Date End Date Taking? Authorizing Provider   albuterol sulfate  (90 Base) MCG/ACT inhaler Inhale 2 puffs Every 4 (Four) Hours As Needed for Wheezing. 3/20/24   Janice Polo, PABrijeshC   Azelastine HCl 137 MCG/SPRAY solution Instill 1-2 sprays each nostril twice a day as needed for runny nose, sneezing or increased nasal allergy symptoms. 8/8/24      budesonide-formoterol (Symbicort) 160-4.5 MCG/ACT inhaler Inhale 2 puffs every 12 hours. Use with spacer. Rinse mouth after each use 10/3/23   Hanna Pereira  MD Marcelo   busPIRone (BUSPAR) 5 MG tablet Take 1 tablet by mouth 2 (Two) Times a Day As Needed (Anxiety). 24   Kiresten Gallardo APRN   dicyclomine (BENTYL) 20 MG tablet Take 1 tablet by mouth Every 6 (Six) Hours. 24   Rosey Marrero APRN   escitalopram (Lexapro) 10 MG tablet Take 1 tablet by mouth Daily. 24   Kiersten Gallardo APRN   fexofenadine (Allegra Allergy) 180 MG tablet Take 1 tablet by mouth Daily. 24   Karen Durand MD   fluticasone (FLONASE) 50 MCG/ACT nasal spray instill 2 sprays in each nostril once daily 24   Hanna Pereira MD   ibuprofen (ADVIL,MOTRIN) 800 MG tablet Take 1 tablet by mouth Every 6 (Six) Hours As Needed for Mild Pain. 24   Hanna Pereira MD   montelukast (SINGULAIR) 10 MG tablet Take one tablet daily at bedtime 23      pantoprazole (Protonix) 40 MG EC tablet Take 1 tablet by mouth Daily. 24   Hanna Pereira MD   traZODone (DESYREL) 50 MG tablet Take 1-2 tablets by mouth every night at bedtime.  Patient taking differently: Take 1-2 tablets by mouth every night at bedtime. As needed 24   Kiersten Gallardo APRN        Social History:   Social History     Tobacco Use    Smoking status: Former     Current packs/day: 0.00     Average packs/day: 2.0 packs/day for 10.0 years (20.0 ttl pk-yrs)     Types: Cigarettes     Start date: 2017     Quit date: 2020     Years since quittin.6     Passive exposure: Past    Smokeless tobacco: Never   Vaping Use    Vaping status: Never Used   Substance Use Topics    Alcohol use: Not Currently     Alcohol/week: 3.0 standard drinks of alcohol     Types: 3 Glasses of wine per week     Comment: OCCASIONAL/SOCIAL    Drug use: Never         Review of Systems:  Review of Systems   Constitutional:  Negative for appetite change, fatigue and fever.   Eyes:  Negative for visual disturbance.   Respiratory:  Positive for chest tightness and shortness of breath.    Cardiovascular:   "Negative for chest pain.   Gastrointestinal:  Negative for nausea.   Genitourinary:  Negative for dysuria and urgency.   Neurological:  Negative for dizziness, light-headedness and headaches.        Physical Exam:  /86 (BP Location: Right arm, Patient Position: Sitting)   Pulse 75   Temp 97.9 °F (36.6 °C) (Oral)   Resp 18   Ht 165.1 cm (65\")   Wt (!) 138 kg (305 lb 1.9 oz)   SpO2 96%   BMI 50.77 kg/m²         Physical Exam  Vitals reviewed.   Constitutional:       Appearance: She is morbidly obese.   HENT:      Head: Normocephalic.      Mouth/Throat:      Mouth: Mucous membranes are moist.   Eyes:      Pupils: Pupils are equal, round, and reactive to light.   Cardiovascular:      Rate and Rhythm: Normal rate and regular rhythm.      Heart sounds: Normal heart sounds.   Pulmonary:      Effort: Pulmonary effort is normal.      Breath sounds: Examination of the right-lower field reveals decreased breath sounds. Examination of the left-lower field reveals decreased breath sounds. Decreased breath sounds present.   Abdominal:      General: There is no distension.   Musculoskeletal:      Cervical back: Neck supple.   Skin:     General: Skin is warm and dry.      Findings: No rash.   Neurological:      General: No focal deficit present.      Mental Status: She is alert and oriented to person, place, and time.   Psychiatric:         Mood and Affect: Mood normal.         Behavior: Behavior normal.                    Procedures:  Procedures      Medical Decision Making:      Comorbidities that affect care:    Asthma, Obesity    External Notes reviewed:    None      The following orders were placed and all results were independently analyzed by me:  Orders Placed This Encounter   Procedures    COVID-19, FLU A/B, RSV PCR 1 HR TAT - Swab, Nasopharynx    XR Chest 1 View    [place] Draw    Comprehensive Metabolic Panel    BNP    Single High Sensitivity Troponin T    CBC Auto Differential    ECG 12 Lead ED Triage " Standing Order; SOA    CBC & Differential    Green Top (Gel)    Lavender Top    Gold Top - SST    Light Blue Top       Medications Given in the Emergency Department:  Medications   ipratropium-albuterol (DUO-NEB) nebulizer solution 3 mL (3 mL Nebulization Given 9/9/24 1755)   predniSONE (DELTASONE) tablet 20 mg (20 mg Oral Given 9/9/24 1750)        ED Course:    The patient was initially evaluated in the triage area where orders were placed. The patient was later dispositioned by Alyce B Seaver, APRN.      The patient was advised to stay for completion of workup which includes but is not limited to communication of labs and radiological results, reassessment and plan. The patient was advised that leaving prior to disposition by a provider could result in critical findings that are not communicated to the patient.     ED Course as of 09/10/24 0029   Mon Sep 09, 2024   1531 PROVIDER IN TRIAGE  Patient was evaluated by me in triage, Maximo Moy PA-C.  Orders were placed and patient is currently awaiting final results and disposition.  [MD]      ED Course User Index  [MD] Maximo Moy PA-C       Labs:    Lab Results (last 24 hours)       Procedure Component Value Units Date/Time    CBC & Differential [[id number]]  (Abnormal) Collected: 09/09/24 1557    Specimen: Blood from Arm, Right Updated: 09/09/24 1628    Narrative:      The following orders were created for panel order CBC & Differential.  Procedure                               Abnormality         Status                     ---------                               -----------         ------                     CBC Auto Differential[[id number]]        Abnormal            Final result                 Please view results for these tests on the individual orders.    Comprehensive Metabolic Panel [[id number]] Collected: 09/09/24 1557    Specimen: Blood from Arm, Right Updated: 09/09/24 1645     Glucose 80 mg/dL      BUN 18 mg/dL      Creatinine 0.73 mg/dL       Sodium 138 mmol/L      Potassium 3.6 mmol/L      Chloride 102 mmol/L      CO2 24.5 mmol/L      Calcium 9.1 mg/dL      Total Protein 6.9 g/dL      Albumin 4.0 g/dL      ALT (SGPT) 11 U/L      AST (SGOT) 14 U/L      Alkaline Phosphatase 80 U/L      Total Bilirubin <0.2 mg/dL      Globulin 2.9 gm/dL      A/G Ratio 1.4 g/dL      BUN/Creatinine Ratio 24.7     Anion Gap 11.5 mmol/L      eGFR 108.8 mL/min/1.73     Narrative:      GFR Normal >60  Chronic Kidney Disease <60  Kidney Failure <15      BNP [[id number]]  (Normal) Collected: 09/09/24 1557    Specimen: Blood from Arm, Right Updated: 09/09/24 1651     proBNP 65.6 pg/mL     Narrative:      This assay is used as an aid in the diagnosis of individuals suspected of having heart failure. It can be used as an aid in the diagnosis of acute decompensated heart failure (ADHF) in patients presenting with signs and symptoms of ADHF to the emergency department (ED). In addition, NT-proBNP of <300 pg/mL indicates ADHF is not likely.    Age Range Result Interpretation  NT-proBNP Concentration (pg/mL:      <50             Positive            >450                   Gray                 300-450                    Negative             <300    50-75           Positive            >900                  Gray                300-900                  Negative            <300      >75             Positive            >1800                  Gray                300-1800                  Negative            <300    Single High Sensitivity Troponin T [[id number]]  (Normal) Collected: 09/09/24 1557    Specimen: Blood from Arm, Right Updated: 09/09/24 1651     HS Troponin T <6 ng/L     Narrative:      High Sensitive Troponin T Reference Range:  <14.0 ng/L- Negative Female for AMI  <22.0 ng/L- Negative Male for AMI  >=14 - Abnormal Female indicating possible myocardial injury.  >=22 - Abnormal Male indicating possible myocardial injury.   Clinicians would have to utilize clinical acumen, EKG,  Troponin, and serial changes to determine if it is an Acute Myocardial Infarction or myocardial injury due to an underlying chronic condition.         CBC Auto Differential [[id number]]  (Abnormal) Collected: 09/09/24 1557    Specimen: Blood from Arm, Right Updated: 09/09/24 1628     WBC 9.76 10*3/mm3      RBC 4.50 10*6/mm3      Hemoglobin 10.7 g/dL      Hematocrit 34.9 %      MCV 77.6 fL      MCH 23.8 pg      MCHC 30.7 g/dL      RDW 15.8 %      RDW-SD 43.9 fl      MPV 10.7 fL      Platelets 340 10*3/mm3      Neutrophil % 56.0 %      Lymphocyte % 29.4 %      Monocyte % 8.3 %      Eosinophil % 5.2 %      Basophil % 0.7 %      Immature Grans % 0.4 %      Neutrophils, Absolute 5.46 10*3/mm3      Lymphocytes, Absolute 2.87 10*3/mm3      Monocytes, Absolute 0.81 10*3/mm3      Eosinophils, Absolute 0.51 10*3/mm3      Basophils, Absolute 0.07 10*3/mm3      Immature Grans, Absolute 0.04 10*3/mm3      nRBC 0.0 /100 WBC     COVID-19, FLU A/B, RSV PCR 1 HR TAT - Swab, Nasopharynx [[id number]]  (Normal) Collected: 09/09/24 1708    Specimen: Swab from Nasopharynx Updated: 09/09/24 1754     COVID19 Not Detected     Influenza A PCR Not Detected     Influenza B PCR Not Detected     RSV, PCR Not Detected    Narrative:      Fact sheet for providers: https://www.fda.gov/media/575232/download    Fact sheet for patients: https://www.fda.gov/media/812795/download    Test performed by PCR.             Imaging:    XR Chest 1 View    Result Date: 9/9/2024  XR CHEST 1 VW Date of Exam: 9/9/2024 4:10 PM EDT Indication: SOA Triage Protocol Comparison: X-ray May 15, 2024 Findings: There is a scoliosis of the thoracolumbar spine. There is some atelectasis in the left lower chest. The right lung is clear. There are no pleural effusions.     Impression: 1.Atelectasis or scarring in the lingula/left lower lobe 2.Scoliosis Electronically Signed: Orlando Zarate MD  9/9/2024 4:23 PM EDT  Workstation ID: [id number]       Differential Diagnosis and  Discussion:      Dyspnea: Differential diagnosis includes but is not limited to metabolic acidosis, neurological disorders, psychogenic, asthma, pneumothorax, upper airway obstruction, COPD, pneumonia, noncardiogenic pulmonary edema, interstitial lung disease, anemia, congestive heart failure, and pulmonary embolism    All labs were reviewed and interpreted by me.  All X-rays impressions were independently interpreted by me.  EKG was interpreted by me.    MDM  Number of Diagnoses or Management Options  Dyspnea, unspecified type  Mild intermittent asthma with exacerbation  Diagnosis management comments: The patient presents with shortness of breath consistent with their asthma exacerbations. The patient was monitored in the ED for3 hours. The patient reports significant relief of their symptoms with ED treatment. The patient has no respiratory distress or hypoxia. This includes the absence of cervical, clavicular, and abdominal retractions; tachypnea and an oxygen saturation of less than 92% on room air.  The patient is able to speak in full sentences and is ambulatory without hypoxia on exertion. The patient's condition is stable and appropriate for discharge. The patient will be discharged with a prescription for bronchodilators and a steroid. The patient was advised to return for fever, respiratory distress, intractable vomiting, weakness, worsening shortness of breath, chest pain, or altered mental status. The []will pursue further outpatient evaluation with the primary care physician. The []has expressed a clear and thorough understanding and agreed to follow-up as instructed.       Amount and/or Complexity of Data Reviewed  Clinical lab tests: reviewed  Tests in the radiology section of CPT®: reviewed  Tests in the medicine section of CPT®: reviewed           The patient presents with dyspnea.  Patient was placed on the cardiac monitor after given duo-neb.  They were monitored for ventricular ectopy,  arrhythmia, tachycardia, hypoxia, and changes in blood pressure.  Continuous pulse oximetry was placed in waveform with corresponding oxygen saturation was assessed throughout their stay in the emergency department.  Patient was rechecked several times in the ED for decline in mental status and worsening distress.    Total Critical Care time of 60 minutes. Total critical care time documented does not include time spent on separately billed procedures for services of nurses or physician assistants. I personally saw and examined the patient. I have reviewed all diagnostic interpretations and treatment plans as written. I was present for the key portions of any procedures performed and the inclusive time noted in any critical care statement. Critical care time includes patient management by me, time spent at the patients bedside,  time to review lab and imaging results, discussing patient care, documentation in the medical record, and time spent with family or caregiver.      Patient Care Considerations:    ANTIBIOTICS: I considered prescribing antibiotics as an outpatient however no bacterial focus of infection was found.      Consultants/Shared Management Plan:    None    Social Determinants of Health:    Patient is independent, reliable, and has access to care.       Disposition and Care Coordination:    Discharged: The patient is suitable and stable for discharge with no need for consideration of admission.    I have explained the patient´s condition, diagnoses and treatment plan based on the information available to me at this time. I have answered questions and addressed any concerns. The patient has a good  understanding of the patient´s diagnosis, condition, and treatment plan as can be expected at this point. The vital signs have been stable. The patient´s condition is stable and appropriate for discharge from the emergency department.      The patient will pursue further outpatient evaluation with the primary  care physician or other designated or consulting physician as outlined in the discharge instructions. They are agreeable to this plan of care and follow-up instructions have been explained in detail. The patient has received these instructions in written format and has expressed an understanding of the discharge instructions. The patient is aware that any significant change in condition or worsening of symptoms should prompt an immediate return to this or the closest emergency department or call to 911.  I have explained discharge medications and the need for follow up with the patient/caretakers. This was also printed in the discharge instructions. Patient was discharged with the following medications and follow up:      Medication List        New Prescriptions      methylPREDNISolone 4 MG dose pack  Commonly known as: MEDROL  Take as directed on package instructions.            Changed      traZODone 50 MG tablet  Commonly known as: DESYREL  Take 1-2 tablets by mouth every night at bedtime.  What changed: additional instructions               Where to Get Your Medications        These medications were sent to [facility]  [address] [place] [address]      Hours: Monday to Friday 9 AM to 7:30 PM, Saturday 9 AM to 2 PM Phone: [phone]   methylPREDNISolone 4 MG dose pack      Hanna Pereira MD  [address] [address] [phone]             Final diagnoses:   Dyspnea, unspecified type   Mild intermittent asthma with exacerbation        ED Disposition       ED Disposition   Discharge    Condition   Stable    Comment   --               This medical record created using voice recognition software.             Seaver, Alyce B, APRN  09/10/24 0029

## 2024-09-09 NOTE — DISCHARGE INSTRUCTIONS
Follow-up with your primary care provider.  Return to ER if symptoms worsen or fail to improve.    Continue using at home Symbicort and albuterol.  Take steroid pack.  It is okay to begin the steroid pack tomorrow as you had a dose in the emergency department today.

## 2024-09-18 ENCOUNTER — TELEPHONE (OUTPATIENT)
Dept: INTERNAL MEDICINE | Facility: CLINIC | Age: 39
End: 2024-09-18
Payer: COMMERCIAL

## 2024-09-19 ENCOUNTER — OFFICE VISIT (OUTPATIENT)
Dept: INTERNAL MEDICINE | Facility: CLINIC | Age: 39
End: 2024-09-19
Payer: COMMERCIAL

## 2024-09-19 VITALS
BODY MASS INDEX: 48.82 KG/M2 | SYSTOLIC BLOOD PRESSURE: 132 MMHG | DIASTOLIC BLOOD PRESSURE: 86 MMHG | WEIGHT: 293 LBS | TEMPERATURE: 96.4 F | OXYGEN SATURATION: 97 % | HEIGHT: 65 IN | HEART RATE: 62 BPM | RESPIRATION RATE: 14 BRPM

## 2024-09-19 DIAGNOSIS — J32.9 VIRAL SINUSITIS: Primary | ICD-10-CM

## 2024-09-19 DIAGNOSIS — R50.9 FEVER, UNSPECIFIED FEVER CAUSE: ICD-10-CM

## 2024-09-19 DIAGNOSIS — B97.89 VIRAL SINUSITIS: Primary | ICD-10-CM

## 2024-09-19 LAB
EXPIRATION DATE: NORMAL
FLUAV AG UPPER RESP QL IA.RAPID: NOT DETECTED
FLUBV AG UPPER RESP QL IA.RAPID: NOT DETECTED
INTERNAL CONTROL: NORMAL
Lab: NORMAL
QT INTERVAL: 424 MS
QTC INTERVAL: 430 MS
SARS-COV-2 AG UPPER RESP QL IA.RAPID: NOT DETECTED

## 2024-09-19 PROCEDURE — 99213 OFFICE O/P EST LOW 20 MIN: CPT | Performed by: NURSE PRACTITIONER

## 2024-09-19 PROCEDURE — 87428 SARSCOV & INF VIR A&B AG IA: CPT | Performed by: NURSE PRACTITIONER

## 2024-09-19 RX ORDER — BROMPHENIRAMINE MALEATE, PSEUDOEPHEDRINE HYDROCHLORIDE, AND DEXTROMETHORPHAN HYDROBROMIDE 2; 30; 10 MG/5ML; MG/5ML; MG/5ML
10 SYRUP ORAL 4 TIMES DAILY PRN
Qty: 240 ML | Refills: 0 | Status: SHIPPED | OUTPATIENT
Start: 2024-09-19

## 2024-09-19 RX ORDER — PREDNISONE 20 MG/1
40 TABLET ORAL DAILY
Qty: 10 TABLET | Refills: 0 | Status: SHIPPED | OUTPATIENT
Start: 2024-09-19

## 2024-09-23 ENCOUNTER — DOCUMENTATION (OUTPATIENT)
Dept: PHYSICAL THERAPY | Facility: CLINIC | Age: 39
End: 2024-09-23
Payer: COMMERCIAL

## 2024-09-24 ENCOUNTER — OFFICE VISIT (OUTPATIENT)
Dept: GASTROENTEROLOGY | Facility: CLINIC | Age: 39
End: 2024-09-24
Payer: COMMERCIAL

## 2024-09-24 VITALS
SYSTOLIC BLOOD PRESSURE: 113 MMHG | BODY MASS INDEX: 48.82 KG/M2 | HEIGHT: 65 IN | DIASTOLIC BLOOD PRESSURE: 75 MMHG | HEART RATE: 58 BPM | OXYGEN SATURATION: 96 % | WEIGHT: 293 LBS

## 2024-09-24 DIAGNOSIS — D64.9 ANEMIA, UNSPECIFIED TYPE: Primary | ICD-10-CM

## 2024-09-24 DIAGNOSIS — Z87.19 HISTORY OF COLITIS: ICD-10-CM

## 2024-09-24 DIAGNOSIS — K58.0 IRRITABLE BOWEL SYNDROME WITH DIARRHEA: ICD-10-CM

## 2024-09-24 DIAGNOSIS — K21.9 GERD WITHOUT ESOPHAGITIS: ICD-10-CM

## 2024-09-24 PROCEDURE — 99214 OFFICE O/P EST MOD 30 MIN: CPT | Performed by: NURSE PRACTITIONER

## 2024-09-24 RX ORDER — PANTOPRAZOLE SODIUM 40 MG/1
40 TABLET, DELAYED RELEASE ORAL DAILY
Qty: 90 TABLET | Refills: 1 | Status: SHIPPED | OUTPATIENT
Start: 2024-09-24

## 2024-09-24 RX ORDER — ALBUTEROL SULFATE 90 UG/1
2 INHALANT RESPIRATORY (INHALATION) EVERY 4 HOURS PRN
Qty: 8.5 G | Refills: 2 | Status: SHIPPED | OUTPATIENT
Start: 2024-09-24

## 2024-09-30 DIAGNOSIS — F41.1 GENERALIZED ANXIETY DISORDER: ICD-10-CM

## 2024-09-30 DIAGNOSIS — F43.10 POST TRAUMATIC STRESS DISORDER (PTSD): ICD-10-CM

## 2024-09-30 DIAGNOSIS — F51.01 PRIMARY INSOMNIA: ICD-10-CM

## 2024-09-30 DIAGNOSIS — F33.2 SEVERE EPISODE OF RECURRENT MAJOR DEPRESSIVE DISORDER, WITHOUT PSYCHOTIC FEATURES: ICD-10-CM

## 2024-09-30 RX ORDER — ESCITALOPRAM OXALATE 10 MG/1
10 TABLET ORAL DAILY
Qty: 30 TABLET | Refills: 1 | Status: SHIPPED | OUTPATIENT
Start: 2024-09-30

## 2024-09-30 RX ORDER — TRAZODONE HYDROCHLORIDE 50 MG/1
50-100 TABLET, FILM COATED ORAL
Qty: 60 TABLET | Refills: 1 | Status: SHIPPED | OUTPATIENT
Start: 2024-09-30

## 2024-09-30 NOTE — TELEPHONE ENCOUNTER
REFILL REQUEST FROM THE PT AND OR PHARMACY FOR PTS:     escitalopram (Lexapro) 10 MG tablet (08/06/2024)     traZODone (DESYREL) 50 MG tablet (05/06/2024)     FOLLOW UP APPT- 11/11/2024.  PT LAST SEEN ON- 08/06/2024.

## 2024-10-11 ENCOUNTER — OFFICE VISIT (OUTPATIENT)
Dept: SLEEP MEDICINE | Facility: HOSPITAL | Age: 39
End: 2024-10-11
Payer: COMMERCIAL

## 2024-10-11 VITALS
HEART RATE: 81 BPM | OXYGEN SATURATION: 97 % | BODY MASS INDEX: 50.02 KG/M2 | WEIGHT: 293 LBS | SYSTOLIC BLOOD PRESSURE: 122 MMHG | HEIGHT: 64 IN | DIASTOLIC BLOOD PRESSURE: 65 MMHG

## 2024-10-11 DIAGNOSIS — E66.813 CLASS 3 SEVERE OBESITY WITH SERIOUS COMORBIDITY AND BODY MASS INDEX (BMI) OF 50.0 TO 59.9 IN ADULT, UNSPECIFIED OBESITY TYPE: ICD-10-CM

## 2024-10-11 DIAGNOSIS — F41.9 ANXIETY: ICD-10-CM

## 2024-10-11 DIAGNOSIS — G47.33 OBSTRUCTIVE SLEEP APNEA, ADULT: Primary | ICD-10-CM

## 2024-10-11 DIAGNOSIS — F32.A DEPRESSION, UNSPECIFIED DEPRESSION TYPE: ICD-10-CM

## 2024-10-11 DIAGNOSIS — E66.01 CLASS 3 SEVERE OBESITY WITH SERIOUS COMORBIDITY AND BODY MASS INDEX (BMI) OF 50.0 TO 59.9 IN ADULT, UNSPECIFIED OBESITY TYPE: ICD-10-CM

## 2024-10-11 PROCEDURE — G0463 HOSPITAL OUTPT CLINIC VISIT: HCPCS

## 2024-10-11 NOTE — PROGRESS NOTES
[address] [place], [place] [address]  Phone: [phone]  Fax: [phone]      SLEEP CLINIC FOLLOW UP PROGRESS NOTE.    Jessie Romero  [id number]   1985  38 y.o.  female      PCP: Hanna Pereira MD      Date of visit: 10/11/2024    Chief Complaint   Patient presents with    Sleep Apnea       Medications and allergies are reviewed by me and documented in the encounter.     SOCIAL (habits pertaining to sleep medicine)  History tobacco use:No  History of alcohol use: 0-1 per week  Caffeine use: 2 beverages/d    HPI:  This is a 38 y.o. PMHx anxiety/depression. Here for management of obstructive sleep apnea (JOSE 21.6/hr with sleep-related hypoxia on 6/26/2024 HST; s/p titration study on 7/24/2024 with oxygenation adequate on PAP started on auto-bilevel).Patient is using positive airway pressure therapy and the symptoms of sleep apnea have improved significantly on the therapy. Normally patient goes to bed at 7 PM and wakes up at 430 AM .  The patient wakes up 1 time(s) during the night and has no problem going back to sleep.  Feels refreshed after waking up.       Overall impression of PAP therapy: She states she feels much better with bilevel after titration  She does note that the AirFit F20 fullface mask is uncomfortable especially on the bridge of her nose  No air pressure concerns    Compliance data as reviewed by me with patient room today:  Date range: 9/3/2024 - 10/2/2024  Overall use 100%  4-hour latasha 87%  Average days used 7 hours 40 minutes  Device air curve 11V-auto  Max IPAP 25 cm H2O  Min EPAP 8 cm H2O  Pressure support 4 cm H2O  95th percentile IPAP 15.1 cm H2O  95th percentile EPAP 11.2 cm H2O  95th percentile leak is 6.0 LPM  AHI 1.7  DME [employer]  Mask FFM airfit f20 - uncomfortable especially on bridge of nose      - Anxiety/depression states she is doing well today  On lexapro 10 mg qam  And also buspar prn BID     -Obesity  Wt Readings from Last 3 Encounters:  "  10/11/24 (!) 136 kg (300 lb 4.8 oz)   09/24/24 (!) 138 kg (303 lb 14.4 oz)   09/19/24 (!) 138 kg (303 lb 9.6 oz)         -Last seen in sleep clinic~4 months ago by me on 6/3/2024 snoring witnessed apneas by her mother and her grandmother.  She never had a sleep study.  REVIEW OF SYSTEMS:   Is negative unless otherwise noted in HPI  [place] Sleepiness Scale :Total score: 7     Disclaimer History: The above history is based on this sleep physician's in room encounter with the patient. Pre encounter self administered questionnaires are taken into consideration and discussed with patient for any discordance. The above documentation by this sleep physician is the most accurate clinical information determined by in room sleep physician encounter with patient.     PHYSICAL EXAMINATION:  Vitals:    10/11/24 1300   BP: 122/65   Pulse: 81   SpO2: 97%   Weight: (!) 136 kg (300 lb 4.8 oz)   Height: 162.6 cm (64.02\")    Body mass index is 51.52 kg/m².   CONSTITUTIONAL: Well appearing, in no overt pain or respiratory distress   NOSE: No septal defect  RESP SYSTEM:  No overt respiratory distress, speaks in clear sentences without dyspnea, no accessory muscle use  CARDIOVASULAR: No edema noted  NEURO: Oriented x 3, no gross focal deficits   Psychiatric: Very pleasant, affect appropriate, goal oriented    Compliance data as reviewed by me with patient room today:  Date range: 9/3/2024 - 10/2/2024  Overall use 100%  4-hour latasha 87%  Average days used 7 hours 40 minutes  Device air curve 11V-auto  Max IPAP 25 cm H2O  Min EPAP 8 cm H2O  Pressure support 4 cm H2O  95th percentile IPAP 15.1 cm H2O  95th percentile EPAP 11.2 cm H2O  95th percentile leak is 6.0 LPM  AHI 1.7  DME [employer]  Mask FFM airfit f20 - uncomfortable especially on bridge of nose      ASSESSMENT AND PLAN:  Obstructive sleep apnea ( G 47.33).    -Specific Changes made by me today:  I. After review of compliance data, in visit clinical correlation, and through shared " decision making: will not make any changes to PAP therapy settings.  II.  Order placed for mask fitting with AirTouch F20 fullface mask  III. Counseled patient to follow-up with me in 4 months for therapy review.  Counseled may request sooner follow-up to sleep clinic anytime the patient feels necessary.  The symptoms of sleep apnea have improved with the device and the treatment.  Patient's compliance with the device is excellent for treatment of sleep apnea.  I have independently reviewed the smart card down load and discussed with the patient the download data and encouarged the patient to continue to use the device.The residual AHI is acceptable. The device is benefiting the patient and the device is medically necessary.  Without proper control of sleep apnea and good compliance there is a increased risk for hypertension, diabetes mellitus and nonrestorative sleep with hypersomnia which can increase risk for motor vehicle accidents.  Untreated sleep apnea is also a risk factor for development of atrial fibrillation, pulmonary hypertension, insulin resistance and stroke. The patient is also instructed to get the supplies from the [employer] and and change them on a regular basis.  A prescription for supplies has been sent to the [facility] [facility].  I have also discussed the good sleep hygiene habits and adequate amount of sleep needed for good health.  Obesity, with BMI is Body mass index is 51.52 kg/m².. Counseled weight loss will be beneficial for reduction in severity of sleep apnea, healthy diet/exercise to achieve same, follow up with primary care physician for serial monitoring and to further guide management.  Anxiety/depression, she appears to be doing well today.  Compliant with home therapies reviewed.  Counseled patient PAP therapy compliance for treatment of sleep apnea may be potentially beneficial for comorbid mood disorders as well.  Follow-up with treating clinician for mood disorders as  previous.    Follow up in 4 months. Patient's questions were answered.        EMR Dragon/Transcription disclaimer:   Much of this encounter note is an electronic transcription/translation of spoken language to printed text. The electronic translation of spoken language may permit erroneous, or at times, nonsensical words or phrases to be inadvertently transcribed; Although I have reviewed the note for such errors, some may still exist.       NPI #: [id number]    Karime Dozier, DO  Sleep Medicine  [facility]  10/11/24

## 2024-10-16 ENCOUNTER — OFFICE VISIT (OUTPATIENT)
Dept: BARIATRICS/WEIGHT MGMT | Facility: CLINIC | Age: 39
End: 2024-10-16
Payer: COMMERCIAL

## 2024-10-16 VITALS
WEIGHT: 293 LBS | TEMPERATURE: 97.8 F | BODY MASS INDEX: 48.82 KG/M2 | HEIGHT: 65 IN | HEART RATE: 75 BPM | DIASTOLIC BLOOD PRESSURE: 55 MMHG | SYSTOLIC BLOOD PRESSURE: 138 MMHG

## 2024-10-16 DIAGNOSIS — E66.813 CLASS 3 SEVERE OBESITY DUE TO EXCESS CALORIES WITHOUT SERIOUS COMORBIDITY WITH BODY MASS INDEX (BMI) OF 50.0 TO 59.9 IN ADULT: Primary | ICD-10-CM

## 2024-10-16 DIAGNOSIS — K21.9 GERD WITHOUT ESOPHAGITIS: ICD-10-CM

## 2024-10-16 DIAGNOSIS — E66.01 CLASS 3 SEVERE OBESITY DUE TO EXCESS CALORIES WITHOUT SERIOUS COMORBIDITY WITH BODY MASS INDEX (BMI) OF 50.0 TO 59.9 IN ADULT: Primary | ICD-10-CM

## 2024-10-16 DIAGNOSIS — F41.8 DEPRESSION WITH ANXIETY: ICD-10-CM

## 2024-10-16 PROCEDURE — 99214 OFFICE O/P EST MOD 30 MIN: CPT | Performed by: NURSE PRACTITIONER

## 2024-10-16 RX ORDER — PHENTERMINE HYDROCHLORIDE 37.5 MG/1
37.5 TABLET ORAL
Qty: 30 TABLET | Refills: 2 | Status: SHIPPED | OUTPATIENT
Start: 2024-10-16

## 2024-10-16 RX ORDER — TOPIRAMATE 25 MG/1
TABLET, FILM COATED ORAL
Qty: 166 TABLET | Refills: 0 | Status: SHIPPED | OUTPATIENT
Start: 2024-10-16 | End: 2025-01-16

## 2024-10-16 NOTE — PROGRESS NOTES
[facility]  [address]  [facility] [id number] [phone]  [address]  [facility] [id number] [phone]  Dept: [phone]  10/16/2024      Jessie Romero.  [id number]  [id number]  1985  female      Chief Complaint   Patient presents with    Follow-up     Follow up [facility] #5       [facility]:   Jessie Romero presents today for follow up today for provider supervised medical weight loss who was prescribed qsymia two months ago but reports being told her insurance does not cover the medication and has not started this. I do not see that a PA has been done for this medication    HPI:   Today's weight is (!) 137 kg (303 lb) pounds, today's BMI is Body mass index is 50.48 kg/m²., she has a  gain of 3 pounds since the last visit.     Jessie Romero denies nausea, vomiting,      Diet and Exercise: Diet history reviewed and discussed with the patient. Weight loss/gains to date discussed with the patient. The patient states they are eating 60 grams of protein per day. She reports eating 3 meals per day, a typical portion size of 1/2 cup, eating 0-1 snacks per day, drinking 5-6 or more 8-oz. glasses of water per day, no carbonated beverage consumption and exercising regularly.     She has been tracking her diet in an miladys. She has been doing at 16-8 hour fast. She fasts from 5pm to 9am. She has cut out all soda. She is getting more than 3 liters of water daily. She has been limiting her simple carb intake. She is only eating chickpea pasta if she eats any of this. She is getting protein at all of her meals. She has been doing whey protein shakes and getting in around 66g of protein daily. She is keeping carbs under 100g.     Supplements: none     Review of Systems   Constitutional:  Positive for appetite change. Negative for fatigue and unexpected weight change.   HENT: Negative.      Eyes: Negative.    Respiratory: Negative.     Cardiovascular: Negative.  Negative for leg swelling.   Gastrointestinal:  Negative for abdominal distention, abdominal pain, constipation, diarrhea, nausea and vomiting.   Genitourinary:  Negative for difficulty urinating, frequency and urgency.   Musculoskeletal:  Negative for back pain.   Skin: Negative.    Psychiatric/Behavioral: Negative.     All other systems reviewed and are negative.      Patient Active Problem List   Diagnosis    Class 3 severe obesity due to excess calories without serious comorbidity with body mass index (BMI) of 50.0 to 59.9 in adult    Syncope    GERD without esophagitis    Asthma    Heartburn    Depression with anxiety    Hypersomnolence    Migraine with aura and without status migrainosus, not intractable    History of colitis    Altered bowel habits    Diarrhea    Generalized abdominal pain       Past Medical History:   Diagnosis Date    Abnormal ECG 3 month ago    Abnormal Pap smear of cervix     ADHD     Adrenal mass     AT AGE 26    Allergic 2000    Moved to [place]    Allergic rhinitis     Anemia     Anxiety     Arthritis     Asthma     Depression     GERD (gastroesophageal reflux disease)     Headache 2002    Heart murmur     History of heart attack 05/03/2024    History of night terrors     Hyperlipidemia     Migraine     Myocardial infarction 03/2010    Ovarian cyst     Sleep apnea     Tear of meniscus of knee        The following portions of the patient's history were reviewed and updated as appropriate: allergies, current medications, past family history, past medical history, past social history, past surgical history, and problem list.    Vitals:    10/16/24 1521   BP: 138/55   Pulse: 75   Temp: 97.8 °F (36.6 °C)       Physical Exam  Vitals and nursing note reviewed.   Constitutional:       Appearance: She is well-developed.   Neck:      Thyroid: No thyromegaly.   Cardiovascular:      Rate and Rhythm: Normal rate.    Pulmonary:      Effort: Pulmonary effort is normal. No respiratory distress.   Abdominal:      Palpations: Abdomen is soft.   Musculoskeletal:         General: No tenderness.   Skin:     General: Skin is warm and dry.   Neurological:      Mental Status: She is alert.   Psychiatric:         Behavior: Behavior normal.         Assessment:   The patient is doing well. She has gotten fitted for a new CPAP mask and is restarting this therapy. She has gotten more active, cut out all soda, is leading with protein at all meals and snacktimes, is fasting with an 8 and 16 hour schedule. She is a little frustrated at not seeing her weight move much.    Plan:   Will start a combo of phen/top. She will taking her phentermine in the mornings without other stimulants, topiramate late afternoon evening.   Encouraged patient to be sure to get plenty of lean protein per day through small frequent meals all with a protein source.   Activity restrictions: none.   Recommended patient be sure to get at least 70 grams of protein per day by eating small, frequent meals all with high lean protein choices. Be sure to limit/cut back on daily carbohydrate intake. Discussed with the patient the recommended amount of water per day to intake- half of body weight in ounces. Reviewed vitamin requirements. Be sure to do routine exercise, 150 minutes per week minimum, including both cardio and strength training.     Instructions / Recommendations: dietary counseling recommended, recommended a daily protein intake of  grams, vitamin supplement(s) recommended, recommended exercising at least 150 minutes per week, behavior modifications recommended and instructed to call the office for concerns, questions, or problems.     The patient was instructed to follow up in 2 months with me and with the dietitian in one month .     Total time spent during this encounter today was 35 minutes

## 2024-10-24 NOTE — PROGRESS NOTES
"Chief Complaint  Weight Loss (DISCUSS), BLOOD WORK (REQUESTING BLOOD WORK), and Fatigue    Subjective         Jessie Romero presents to [facility] for Concerns regarding weight loss, fatigue, and blood work.    Patient is here today to discuss weight loss.  She has been trying to lose weight now for many years, she had a benign adrenal tumor back in 2010 that was eventually surgically removed, adrenal gland was taken as well.  Patient has been monitored since that time, but she states that she slowly gained weight to the point that she finds it very difficult to lose weight.  She is concerned that there may be an issue with her hormones that are inhibiting her ability to lose weight.  She is wanting to get some lab work today and discuss options for weight loss.  She was started on Saxenda in the fall of last year, but she started it and started having side effects and stopped taking it.  She was trying to follow-up prior to today's visit, but she was unable to.  So she never started the medication back.  Patient admits that she tries different diets, she can have some success short-term, but then falls off quickly.  Patient feels like she has a very active lifestyle, but she is not losing weight with that.  She states that she is done some different exercise routines, which will make her more toned, but she does not lose weight.  She feels like she is tired, and just unable to get the motivation to lose the weight.    She has no acute concerns or complaints today.         Review of Systems    Objective   Vital Signs:   /82 (BP Location: Left arm, Patient Position: Sitting, Cuff Size: Large Adult)   Pulse 81   Temp 98.6 °F (37 °C) (Oral)   Resp 18   Ht 165.1 cm (65\")   Wt (!) 139 kg (306 lb 12.8 oz)   SpO2 97%   BMI 51.05 kg/m²     Physical Exam  Vitals and nursing note reviewed.   Constitutional:       Appearance: Normal appearance. She is obese.   HENT:      Head: " Normocephalic and atraumatic.   Pulmonary:      Effort: Pulmonary effort is normal.   Neurological:      Mental Status: She is alert.   Psychiatric:         Mood and Affect: Mood normal.         Thought Content: Thought content normal.        Result Review :                   Diagnoses and all orders for this visit:    1. Class 3 severe obesity due to excess calories without serious comorbidity with body mass index (BMI) of 50.0 to 59.9 in adult (HCC) (Primary)  Assessment & Plan:  We spent ample amount of time discussing this topic, patient is willing to make some changes at this time.  We discussed Saxenda, we will start that back and use a very slow upward titration to the goal dose.  She will start with every other day and increase every 2 weeks as tolerated.  If she is unable to tolerate she will let us know, we can look at other options at that point.  She is going to work on an exercise routine, we did discuss one of the gyms locally, they do offer free training help, we discussed doing primarily cardio early on, she can do some weight resistance training but cardio ultimately would be the most important at this point.  We discussed different diet techniques, she is going to look at one that is more sustainable for the long-term.  We discussed that healthy weight loss is about 1 pound per week equaling 52 pounds a year, so that this is very much a slow and steady weight loss versus an abrupt weight loss which can be harder on her body.  We will look at labs again in see if there is any concerning results.  Patient agrees and understands the plan.  We will want to follow-up closely in 3 months, and help her with this journey.  We also discussed joining a weight loss support group, which would be very beneficial to her.  She does not have good support within her home, her fiancé is not supportive of her weight loss journey in her opinion.      2. Weight gain  -     CBC & Differential  -     Comprehensive  Metabolic Panel  -     Hemoglobin A1c  -     Lipid Panel  -     Vitamin D 25 Hydroxy  -     TSH  -     T4, Free  -     Cortisol    3. Fatigue, unspecified type  -     CBC & Differential  -     Comprehensive Metabolic Panel  -     Hemoglobin A1c  -     Lipid Panel  -     Vitamin D 25 Hydroxy  -     TSH  -     T4, Free  -     Cortisol    Other orders  -     Discontinue: ketorolac (TORADOL) 10 MG tablet; Take 1 tablet by mouth As Needed for Moderate Pain  (Migraine).  Dispense: 20 tablet; Refill: 0  -     Discontinue: cyclobenzaprine (FLEXERIL) 10 MG tablet; Take 1 tablet by mouth As Needed for Muscle Spasms.  Dispense: 20 tablet; Refill: 0      I spent 50 minutes caring for Jessie on this date of service. This time includes time spent by me in the following activities:preparing for the visit, reviewing tests, obtaining and/or reviewing a separately obtained history, performing a medically appropriate examination and/or evaluation , counseling and educating the patient/family/caregiver, ordering medications, tests, or procedures and documenting information in the medical record  Follow Up   Return in about 3 months (around 8/31/2022) for Recheck.  Patient was given instructions and counseling regarding her condition or for health maintenance advice. Please see specific information pulled into the AVS if appropriate.     LILLIAM Waters  6/1/2022  This note was electronically signed.        Strong peripheral pulses/Capillary refill less/equal to 2 seconds

## 2024-11-10 NOTE — PROGRESS NOTES
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    Safety/Risk Assessment: Risk of self-harm acutely and chronically is moderate to severe.    Static/Dynamic risk factors include diagnosis of mental disorder, psychosocial stressors,Previous suicide attempt, Recent stressor or loss, and Social factors.      MENTAL STATUS EXAM   General Appearance:  Cleanly groomed and dressed and well developed  Eye Contact:  Good eye contact  Attitude:  Cooperative and polite  Motor Activity:  Normal gait, posture  Speech:  Normal rate, tone, volume  Mood and affect:  Normal, pleasant and euthymic  Hopelessness:  Denies  Thought Process:  Logical and goal-directed  Associations/ Thought Content:  No delusions  Hallucinations:  None  Suicidal Ideations:  Not present  Homicidal Ideation:  Not present  Sensorium:  Alert  Orientation:  Person, place, time and situation  Immediate Recall, Recent, and Remote Memory:  Intact  Attention Span/ Concentration:  Good  Fund of Knowledge:  Appropriate for age and educational level  Intellectual Functioning:  Average range  Insight:  Good  Judgement:  Good  Reliability:  Good  Impulse Control:  Good     PHQ-9 Depression Screening  PHQ-9 Total Score: 8    Little interest or pleasure in doing things? Over half   Feeling down, depressed, or hopeless? Several days   PHQ-2 Total Score 3   Trouble falling or staying  asleep, or sleeping too much? Not at all   Feeling tired or having little energy? Over half   Poor appetite or overeating? Not at all   Feeling bad about yourself - or that you are a failure or have let yourself or your family down? Almost all   Trouble concentrating on things, such as reading the newspaper or watching television? Not at all   Moving or speaking so slowly that other people could have noticed? Or the opposite - being so fidgety or restless that you have been moving around a lot more than usual? Not at all   Thoughts that you would be better off dead, or of hurting yourself in some way? Not at all   PHQ-9 Total Score 8   If you checked off any problems, how difficult have these problems made it for you to do your work, take care of things at home, or get along with other people? Not difficult at all       LEONORA-7  Feeling nervous, anxious or on edge: More than half the days  Not being able to stop or control worrying: Nearly every day  Worrying too much about different things: More than half the days  Trouble Relaxing: More than half the days  Being so restless that it is hard to sit still: Nearly every day  Feeling afraid as if something awful might happen: Not at all  Becoming easily annoyed or irritable: Nearly every day  LEONORA 7 Total Score: 15  If you checked any problems, how difficult have these problems made it for you to do your work, take care of things at home, or get along with other people: Not difficult at all    Review of systems is negative except as noted in HPI.  Labs:  WBC   Date Value Ref Range Status   09/09/2024 9.76 3.40 - 10.80 10*3/mm3 Final     Platelets   Date Value Ref Range Status   09/09/2024 340 140 - 450 10*3/mm3 Final     Hemoglobin   Date Value Ref Range Status   09/09/2024 10.7 (L) 12.0 - 15.9 g/dL Final     Hematocrit   Date Value Ref Range Status   09/09/2024 34.9 34.0 - 46.6 % Final     Glucose   Date Value Ref Range Status   09/09/2024 80 65 - 99 mg/dL Final      Creatinine   Date Value Ref Range Status   09/09/2024 0.73 0.57 - 1.00 mg/dL Final     ALT (SGPT)   Date Value Ref Range Status   09/09/2024 11 1 - 33 U/L Final     AST (SGOT)   Date Value Ref Range Status   09/09/2024 14 1 - 32 U/L Final     BUN   Date Value Ref Range Status   09/09/2024 18 6 - 20 mg/dL Final     eGFR   Date Value Ref Range Status   09/09/2024 108.8 >60.0 mL/min/1.73 Final     Total Cholesterol   Date Value Ref Range Status   05/30/2024 203 (H) 0 - 200 mg/dL Final     Triglycerides   Date Value Ref Range Status   05/30/2024 119 0 - 150 mg/dL Final     HDL Cholesterol   Date Value Ref Range Status   05/30/2024 50 40 - 60 mg/dL Final     LDL Cholesterol    Date Value Ref Range Status   05/30/2024 132 (H) 0 - 100 mg/dL Final     VLDL Cholesterol   Date Value Ref Range Status   05/30/2024 21 5 - 40 mg/dL Final     LDL/HDL Ratio   Date Value Ref Range Status   05/30/2024 2.58  Final     Hemoglobin A1C   Date Value Ref Range Status   05/30/2024 5.70 (H) 4.80 - 5.60 % Final     TSH   Date Value Ref Range Status   05/30/2024 1.190 0.270 - 4.200 uIU/mL Final     Free T4   Date Value Ref Range Status   05/31/2022 1.02 0.93 - 1.70 ng/dL Final      Pain Management Panel           No data to display               Imaging Results:  XR Chest 1 View    Result Date: 9/9/2024  Impression: 1.Atelectasis or scarring in the lingula/left lower lobe 2.Scoliosis Electronically Signed: Orlando Zarate MD  9/9/2024 4:23 PM EDT  Workstation ID: [id number]    Current Medications:   Current Outpatient Medications   Medication Sig Dispense Refill    busPIRone (BUSPAR) 5 MG tablet Take 1 tablet by mouth 2 (Two) Times a Day As Needed (Anxiety). 60 tablet 2    escitalopram (Lexapro) 10 MG tablet Take 1 tablet by mouth Daily. 30 tablet 2    traZODone (DESYREL) 50 MG tablet Take 1-2 tablets by mouth every night at bedtime. 60 tablet 2    albuterol sulfate  (90 Base) MCG/ACT inhaler Inhale 2 puffs Every 4 (Four) Hours As Needed  for Wheezing. 8.5 g 2    Azelastine HCl 137 MCG/SPRAY solution Instill 1-2 sprays each nostril twice a day as needed for runny nose, sneezing or increased nasal allergy symptoms. 30 mL 0    dicyclomine (BENTYL) 20 MG tablet Take 1 tablet by mouth Every 6 (Six) Hours. 90 tablet 3    EPINEPHrine (EPIPEN) 0.3 MG/0.3ML solution auto-injector injection Use as directed for acute allergic reaction. 2 each 3    fexofenadine (Allegra Allergy) 180 MG tablet Take 1 tablet by mouth Daily. 90 tablet 0    fluticasone (FLONASE) 50 MCG/ACT nasal spray instill 2 sprays in each nostril once daily 16 g 2    ibuprofen (ADVIL,MOTRIN) 800 MG tablet Take 1 tablet by mouth Every 6 (Six) Hours As Needed for Mild Pain. 90 tablet 0    montelukast (SINGULAIR) 10 MG tablet Take one tablet daily at bedtime 30 tablet 11    pantoprazole (Protonix) 40 MG EC tablet Take 1 tablet by mouth Daily. 90 tablet 1    phentermine (ADIPEX-P) 37.5 MG tablet Take 1 tablet by mouth Every Morning Before Breakfast. 30 tablet 2    topiramate (TOPAMAX) 25 MG tablet Take 1 tablet by mouth Every Night for 14 days, THEN 2 tablets Every Night for 76 days. 166 tablet 0     No current facility-administered medications for this visit.     Problem List:  Patient Active Problem List   Diagnosis    Class 3 severe obesity due to excess calories without serious comorbidity with body mass index (BMI) of 50.0 to 59.9 in adult    Syncope    GERD without esophagitis    Asthma    Heartburn    Depression with anxiety    Hypersomnolence    Migraine with aura and without status migrainosus, not intractable    History of colitis    Altered bowel habits    Diarrhea    Generalized abdominal pain     Allergy:   Allergies   Allergen Reactions    Doxycycline Anaphylaxis     Shortness of air    Morphine Hallucinations    Adhesive Tape Rash     CANNOT USE EKG ADHESIVE STRIPS/RASH & ITCHING    Latex Rash      Discontinued Medications:  Medications Discontinued During This Encounter   Medication  Reason    busPIRone (BUSPAR) 5 MG tablet Reorder    traZODone (DESYREL) 50 MG tablet Reorder    escitalopram (Lexapro) 10 MG tablet Reorder       PLAN:   Presentation seems most consistent with DSM-V criteria for:  Diagnoses and all orders for this visit:    1. Severe episode of recurrent major depressive disorder, without psychotic features (Primary)  -     escitalopram (Lexapro) 10 MG tablet; Take 1 tablet by mouth Daily.  Dispense: 30 tablet; Refill: 2    2. Generalized anxiety disorder  -     busPIRone (BUSPAR) 5 MG tablet; Take 1 tablet by mouth 2 (Two) Times a Day As Needed (Anxiety).  Dispense: 60 tablet; Refill: 2  -     escitalopram (Lexapro) 10 MG tablet; Take 1 tablet by mouth Daily.  Dispense: 30 tablet; Refill: 2    3. Post traumatic stress disorder (PTSD)  -     escitalopram (Lexapro) 10 MG tablet; Take 1 tablet by mouth Daily.  Dispense: 30 tablet; Refill: 2    4. Primary insomnia  -     traZODone (DESYREL) 50 MG tablet; Take 1-2 tablets by mouth every night at bedtime.  Dispense: 60 tablet; Refill: 2          Continue Lexapro 10 mg daily  Continue BuSpar 5 mg twice daily as needed for anxiety  Continue trazodone 50 to 100 mg at bedtime  Follow-up 2 months  Medication Education:   LEXAPRO (ESCITALOPRAM)  Risks, benefits, alternatives discussed with patient including GI upset, nausea vomiting diarrhea, theoretical decrease of seizure threshold predisposing the patient to a slightly higher seizure risk, headaches, sexual dysfunction, serotonin syndrome, bleeding risk.  After discussion of these risks and benefits, the patient voiced understanding and agreed to proceed.  BUSPAR (BUSPIRONE) Risks, benefits, alternatives discussed with patient including nausea, GI upset, mild sedation, falls risk.  After discussion of these risks and benefits, the patient voiced understanding and agreed to proceed.  DESYREL (TRAZODONE) Risks, benefits, side effects discussed with patient including GI upset, sedation,  dizziness/falls risk, grogginess the following day, prolongation of the QTc interval.  After discussion of these risks and benefits, the patient voiced understanding and agreed to proceed.   Medications:   New Medications Ordered This Visit   Medications    busPIRone (BUSPAR) 5 MG tablet     Sig: Take 1 tablet by mouth 2 (Two) Times a Day As Needed (Anxiety).     Dispense:  60 tablet     Refill:  2    escitalopram (Lexapro) 10 MG tablet     Sig: Take 1 tablet by mouth Daily.     Dispense:  30 tablet     Refill:  2    traZODone (DESYREL) 50 MG tablet     Sig: Take 1-2 tablets by mouth every night at bedtime.     Dispense:  60 tablet     Refill:  2      VENKATA reviewed.   Discussed medication options and treatment plan of prescribed medication as well as the risks, benefits, and side effects.  Patient is agreeable to call the office with any worsening of symptoms or onset of side effects.   Patient is agreeable to call 911 or go to the nearest ER should he/she begin having SI/HI.   Patient acknowledged, is agreeable to continue with current treatment plan, and was educated on the importance of compliance with treatment and follow-up appointments.  Addressed all questions and concerns.     Psychotherapy:      Psychotherapy time 40 minutes.  This time is exclusive to the therapy session and separate from the time spent on activities used to meet the criteria for the E/M service (history, exam, medical decision-making).  Goal is to strengthen defenses, promote problems solving, restore adaptive functioning, and provide symptom relief. Esteem building was enhanced through praise, reassurance, normalizing and encouragement. Coping skills were enhanced to build distress tolerance skills and emotional regulation. Allowed patient to freely discuss issues without interruption or judgement with unconditional positive regard, active listening skills, and empathy. Provided a safe, confidential environment to facilitate the  development of a positive therapeutic relationship and encourage open, honest communication. Assisted patient in processing session content, acknowledged and normalized patient’s thoughts, feelings, and concerns by utilizing a person-centered approach in efforts to build appropriate rapport and a positive therapeutic relationship with open and honest communication. Plan to continue supportive psychotherapy in next appointment to provide symptom relief.    Functional status: Moderate symptoms OR moderate difficulty in social occupational or social functioning (51-60)  Prognosis: Good chance of responding well to treatment   Progress: waxing and waning but better overall      Follow-up: Return in about 3 months (around 2/11/2025).     This document has been electronically signed by LILLIAM Stanford  November 22, 2024 12:56 EST  Please note that portions of this note were completed with a voice recognition program.  Copied text in this note has been reviewed and is accurate as of 11/22/24

## 2024-11-11 ENCOUNTER — OFFICE VISIT (OUTPATIENT)
Dept: BEHAVIORAL HEALTH | Facility: CLINIC | Age: 39
End: 2024-11-11
Payer: COMMERCIAL

## 2024-11-11 VITALS
HEIGHT: 65 IN | WEIGHT: 293 LBS | HEART RATE: 70 BPM | SYSTOLIC BLOOD PRESSURE: 120 MMHG | BODY MASS INDEX: 48.82 KG/M2 | DIASTOLIC BLOOD PRESSURE: 56 MMHG

## 2024-11-11 DIAGNOSIS — F43.10 POST TRAUMATIC STRESS DISORDER (PTSD): ICD-10-CM

## 2024-11-11 DIAGNOSIS — F41.1 GENERALIZED ANXIETY DISORDER: ICD-10-CM

## 2024-11-11 DIAGNOSIS — F51.01 PRIMARY INSOMNIA: ICD-10-CM

## 2024-11-11 DIAGNOSIS — F33.2 SEVERE EPISODE OF RECURRENT MAJOR DEPRESSIVE DISORDER, WITHOUT PSYCHOTIC FEATURES: Primary | ICD-10-CM

## 2024-11-11 RX ORDER — TRAZODONE HYDROCHLORIDE 50 MG/1
50-100 TABLET, FILM COATED ORAL
Qty: 60 TABLET | Refills: 2 | Status: SHIPPED | OUTPATIENT
Start: 2024-11-11

## 2024-11-11 RX ORDER — BUSPIRONE HYDROCHLORIDE 5 MG/1
5 TABLET ORAL 2 TIMES DAILY PRN
Qty: 60 TABLET | Refills: 2 | Status: SHIPPED | OUTPATIENT
Start: 2024-11-11

## 2024-11-11 RX ORDER — ESCITALOPRAM OXALATE 10 MG/1
10 TABLET ORAL DAILY
Qty: 30 TABLET | Refills: 2 | Status: SHIPPED | OUTPATIENT
Start: 2024-11-11

## 2024-11-11 NOTE — PATIENT INSTRUCTIONS
1.  Please return to clinic at your next scheduled visit.  Please contact the clinic ([phone]) at least 24 hours prior in the event you need to cancel.  2.  Do no harm to yourself or others.    3.  Avoid alcohol and drugs.    4.  Take all medications as prescribed.  Please contact the clinic with any concerns. If you are in need of medication refills, please call the clinic at [phone].    5. Should you want to get in touch with your provider, LILLIAM Stanford, please contact the office ([phone]), and staff will be able to page Kiersten directly.  6. In the event you have personal crisis, contact the following crisis numbers: Suicide Prevention Hotline 1-908.619.3856; IRINA Helpline 1-591-852-XQEW; [facility] Emergency Room [phone]; text HELLO to 103348; or 832.     SPECIFIC RECOMMENDATIONS:     1.      Medications discussed at this encounter:     New Medications Ordered This Visit   Medications    busPIRone (BUSPAR) 5 MG tablet     Sig: Take 1 tablet by mouth 2 (Two) Times a Day As Needed (Anxiety).     Dispense:  60 tablet     Refill:  2    escitalopram (Lexapro) 10 MG tablet     Sig: Take 1 tablet by mouth Daily.     Dispense:  30 tablet     Refill:  2    traZODone (DESYREL) 50 MG tablet     Sig: Take 1-2 tablets by mouth every night at bedtime.     Dispense:  60 tablet     Refill:  2                       2.      Psychotherapy recommendations: We will continue therapy at future visits.     3.     Return to clinic: Return in about 3 months (around 2/11/2025).

## 2024-11-14 RX ORDER — FLUTICASONE PROPIONATE 50 MCG
2 SPRAY, SUSPENSION (ML) NASAL DAILY
Qty: 16 G | Refills: 2 | Status: SHIPPED | OUTPATIENT
Start: 2024-11-14

## 2024-11-14 RX ORDER — IBUPROFEN 800 MG/1
800 TABLET, FILM COATED ORAL EVERY 6 HOURS PRN
Qty: 90 TABLET | Refills: 0 | Status: SHIPPED | OUTPATIENT
Start: 2024-11-14

## 2024-11-18 ENCOUNTER — TELEMEDICINE (OUTPATIENT)
Dept: BARIATRICS/WEIGHT MGMT | Facility: CLINIC | Age: 39
End: 2024-11-18
Payer: COMMERCIAL

## 2024-11-18 DIAGNOSIS — K21.9 GERD WITHOUT ESOPHAGITIS: ICD-10-CM

## 2024-11-18 DIAGNOSIS — E66.01 OBESITY, CLASS III, BMI 40-49.9 (MORBID OBESITY): Primary | ICD-10-CM

## 2024-11-18 DIAGNOSIS — Z71.3 ENCOUNTER FOR WEIGHT LOSS COUNSELING: ICD-10-CM

## 2024-11-18 PROCEDURE — 97803 MED NUTRITION INDIV SUBSEQ: CPT

## 2024-11-18 NOTE — PROGRESS NOTES
"MEDICAL WEIGHT LOSS NUTRITION FOLLOW UP    Patient Name: Jessie Romero    YOB: 1985   Age: 39 y.o.  Sex: female  MRN: [id number]     ASSESSMENT    Anthropometric Measurements  Estimated body mass index is 48.82 kg/m² as calculated from the following:    Height as of 11/11/24: 165 cm (64.96\").    Weight as of 11/11/24: 133 kg (293 lb).    Patient Goals and Expectations                        Patient's stated weight goal: 200 lbs    Medical History  Past Medical History:   Diagnosis Date    Abnormal ECG 3 month ago    Abnormal Pap smear of cervix     ADHD     Adrenal mass     AT AGE 26    Allergic 2000    Moved to [place]    Allergic rhinitis     Anemia     Anxiety     Arthritis     Asthma     Depression     GERD (gastroesophageal reflux disease)     Headache 2002    Heart murmur     History of heart attack 05/03/2024    History of night terrors     Hyperlipidemia     Migraine     Myocardial infarction 03/2010    Ovarian cyst     Sleep apnea     Tear of meniscus of knee      Past Surgical History:   Procedure Laterality Date    ADRENAL GLAND SURGERY Left 01/01/2010    [facility] [place] DR RAO    COLONOSCOPY N/A 07/08/2024    Procedure: COLONOSCOPY;  Surgeon: Monroe Huang MD;  Location: [facility] ENDOSCOPY;  Service: Gastroenterology;  Laterality: N/A;  NORMAL COLONOSCOPY, NORMAL TI    ENDOSCOPY N/A 07/08/2024    Procedure: ESOPHAGOGASTRODUODENOSCOPY WITH BIOPSIES;  Surgeon: Monroe Huang MD;  Location: [facility] ENDOSCOPY;  Service: Gastroenterology;  Laterality: N/A;  HIATAL HERNIA    HYSTEROSCOPY LEEP BIOPSY  2011       Visit Narrative  Jessie Romero is participating in medical weight loss program under the supervision of a medical specialist and registered dietitian. Spoke with patient today for nutrition follow-up. The patient states they are making efforts to follow the recommendations given at intake appointment including high lean protein, low carbohydrate and " low fat diet. They are working on increasing fruits and vegetable intake, decreasing portion sizes of higher calorie foods and drinking 64 oz. water daily. Patient is working on reducing intake of fast foods, fried foods, sweets and high calorie beverages.    Patient states they have made positive changes including eliminating Dr Pepper and cutting back sugar and carbohydrate intake. The patient admits to be struggling with headaches since cutting out sodas. She is not currently exercising regularly.      Diet Review   Patient is eating 3 meals and 1-2 snacks daily. Fasting from 5pm to 9am.       24 Hour Recall   Breakfast: 2 hardboiled eggs, 1 hash brown, 3-4 strips montague    Lunch: chicken breast or chicken tenders and vegetables with ranch    Dinner: Pork chop with rice and green beans, 2 slices pizza or chicken fajitas   Snacks: Peanuts    Beverages: 2 liters water     DIAGNOSIS     Nutrition problem statement: Overweight/obesity related to multifactorial biochemical, behavioral and environmental contributors to disease as evidenced by BMI 48.82 kg/m².    INTERVENTION    Nutrition education and coaching for behavior change completed. Program materials provided. Reviewed the appropriate dietary choices with the patient and provided guidance and suggestions for making necessary changes. Discussed sustainable habit changes and setting small, achievable goals that can be maintained long term. Recommended focus on eating protein first and aim for minimum of 70-80 grams per day. Limit or weigh/measure portions sizes for high fat and calorie foods including nuts. Discussed portion plate model for guidelines on putting together balanced meals. Suggested the option of keeping a food journal which will help patient become more aware of the nutritional value of food, establish starting point and identify areas for improvement. Aim for at least 64 oz water daily. Be sure to do routine exercise, 150 minutes per week minimum,  including both cardio and strength training. Offered follow up for support and accountability.      MONITORING & EVALUATION    Goals/Plan:   Incorporate regular exercise after work by doing workout video at home      Total time spent during this encounter today exceeded 28 minutes and includes preparing for the visit, reviewing tests, counseling and educating the patient/family/caregiver and documenting information in the medical record.    Electronically signed by:  Rama Blair RD   11/18/24 14:43 EST

## 2024-12-18 ENCOUNTER — TELEPHONE (OUTPATIENT)
Dept: GASTROENTEROLOGY | Facility: CLINIC | Age: 39
End: 2024-12-18
Payer: COMMERCIAL

## 2024-12-18 NOTE — TELEPHONE ENCOUNTER
Spoke with the patient in regards to her over due lab order. Patient stated she will have it completed today or tomorrow.

## 2024-12-26 ENCOUNTER — LAB (OUTPATIENT)
Facility: HOSPITAL | Age: 39
End: 2024-12-26
Payer: COMMERCIAL

## 2024-12-26 LAB
BASOPHILS # BLD AUTO: 0.06 10*3/MM3 (ref 0–0.2)
BASOPHILS NFR BLD AUTO: 0.5 % (ref 0–1.5)
DEPRECATED RDW RBC AUTO: 44.3 FL (ref 37–54)
EOSINOPHIL # BLD AUTO: 0.72 10*3/MM3 (ref 0–0.4)
EOSINOPHIL NFR BLD AUTO: 6.4 % (ref 0.3–6.2)
ERYTHROCYTE [DISTWIDTH] IN BLOOD BY AUTOMATED COUNT: 15.4 % (ref 12.3–15.4)
HCT VFR BLD AUTO: 35.4 % (ref 34–46.6)
HGB BLD-MCNC: 11.2 G/DL (ref 12–15.9)
IMM GRANULOCYTES # BLD AUTO: 0.04 10*3/MM3 (ref 0–0.05)
IMM GRANULOCYTES NFR BLD AUTO: 0.4 % (ref 0–0.5)
LYMPHOCYTES # BLD AUTO: 3.05 10*3/MM3 (ref 0.7–3.1)
LYMPHOCYTES NFR BLD AUTO: 27 % (ref 19.6–45.3)
MCH RBC QN AUTO: 25.4 PG (ref 26.6–33)
MCHC RBC AUTO-ENTMCNC: 31.6 G/DL (ref 31.5–35.7)
MCV RBC AUTO: 80.3 FL (ref 79–97)
MONOCYTES # BLD AUTO: 0.99 10*3/MM3 (ref 0.1–0.9)
MONOCYTES NFR BLD AUTO: 8.8 % (ref 5–12)
NEUTROPHILS NFR BLD AUTO: 56.9 % (ref 42.7–76)
NEUTROPHILS NFR BLD AUTO: 6.44 10*3/MM3 (ref 1.7–7)
NRBC BLD AUTO-RTO: 0 /100 WBC (ref 0–0.2)
PLATELET # BLD AUTO: 369 10*3/MM3 (ref 140–450)
PMV BLD AUTO: 10.1 FL (ref 6–12)
RBC # BLD AUTO: 4.41 10*6/MM3 (ref 3.77–5.28)
WBC NRBC COR # BLD AUTO: 11.3 10*3/MM3 (ref 3.4–10.8)

## 2024-12-26 PROCEDURE — 85025 COMPLETE CBC W/AUTO DIFF WBC: CPT | Performed by: NURSE PRACTITIONER

## 2024-12-27 ENCOUNTER — HOSPITAL ENCOUNTER (EMERGENCY)
Facility: HOSPITAL | Age: 39
Discharge: HOME | End: 2024-12-27
Attending: EMERGENCY MEDICINE
Payer: COMMERCIAL

## 2024-12-27 ENCOUNTER — APPOINTMENT (OUTPATIENT)
Dept: GENERAL RADIOLOGY | Facility: HOSPITAL | Age: 39
End: 2024-12-27
Payer: COMMERCIAL

## 2024-12-27 VITALS
HEART RATE: 90 BPM | HEIGHT: 65 IN | SYSTOLIC BLOOD PRESSURE: 113 MMHG | DIASTOLIC BLOOD PRESSURE: 82 MMHG | TEMPERATURE: 97.8 F | OXYGEN SATURATION: 100 % | RESPIRATION RATE: 19 BRPM | BODY MASS INDEX: 48.82 KG/M2 | WEIGHT: 293 LBS

## 2024-12-27 DIAGNOSIS — M75.51 ACUTE BURSITIS OF RIGHT SHOULDER: Primary | ICD-10-CM

## 2024-12-27 PROCEDURE — 96372 THER/PROPH/DIAG INJ SC/IM: CPT

## 2024-12-27 PROCEDURE — 73030 X-RAY EXAM OF SHOULDER: CPT

## 2024-12-27 PROCEDURE — 25010000002 DEXAMETHASONE PER 1 MG: Performed by: NURSE PRACTITIONER

## 2024-12-27 PROCEDURE — 25010000002 KETOROLAC TROMETHAMINE PER 15 MG: Performed by: NURSE PRACTITIONER

## 2024-12-27 PROCEDURE — 99283 EMERGENCY DEPT VISIT LOW MDM: CPT

## 2024-12-27 RX ORDER — ACETAMINOPHEN 325 MG/1
975 TABLET ORAL ONCE
Status: COMPLETED | OUTPATIENT
Start: 2024-12-27 | End: 2024-12-27

## 2024-12-27 RX ORDER — KETOROLAC TROMETHAMINE 30 MG/ML
60 INJECTION, SOLUTION INTRAMUSCULAR; INTRAVENOUS ONCE
Status: COMPLETED | OUTPATIENT
Start: 2024-12-27 | End: 2024-12-27

## 2024-12-27 RX ORDER — DICLOFENAC SODIUM 75 MG/1
75 TABLET, DELAYED RELEASE ORAL 2 TIMES DAILY
Qty: 30 TABLET | Refills: 0 | Status: SHIPPED | OUTPATIENT
Start: 2024-12-27

## 2024-12-27 RX ORDER — PREDNISONE 50 MG/1
50 TABLET ORAL DAILY
Qty: 4 TABLET | Refills: 0 | Status: SHIPPED | OUTPATIENT
Start: 2024-12-27 | End: 2024-12-31

## 2024-12-27 RX ADMIN — KETOROLAC TROMETHAMINE 60 MG: 30 INJECTION, SOLUTION INTRAMUSCULAR at 06:15

## 2024-12-27 RX ADMIN — ACETAMINOPHEN 975 MG: 325 TABLET ORAL at 06:14

## 2024-12-27 RX ADMIN — DEXAMETHASONE SODIUM PHOSPHATE 10 MG: 10 INJECTION INTRAMUSCULAR; INTRAVENOUS at 06:15

## 2024-12-27 NOTE — ED PROVIDER NOTES
Time: 5:09 AM EST  Date of encounter:  12/27/2024  Independent Historian/Clinical History and Information was obtained by:   Patient    History is limited by: N/A    Chief Complaint: RIGHT SHOULDER PAIN      History of Present Illness:    The patient is a 39 y.o. year old female who presents to the emergency department for evaluation of right shoulder pain.  She states that she woke up yesterday morning and had pain in her right shoulder joint with movement.  She denies any prior injury.  She denies any recent injury.  She states that she went to bed without feeling fine and has not done any unusual activities that she feels would have caused it to hurt.  She denies any previous surgeries.  She is neurovascular intact.  There is no obvious redness swelling or warmth noted.  She states that the pain is mostly anterior right in that joint does hurt worse with any type of flexion or rotation.  She states that she does a lot of lifting and pulling of boxes in her daily job.      Patient Care Team  Primary Care Provider: Hanna Pereira MD    Past Medical History:     Allergies   Allergen Reactions    Doxycycline Anaphylaxis     Shortness of air    Morphine Hallucinations    Adhesive Tape Rash     CANNOT USE EKG ADHESIVE STRIPS/RASH & ITCHING    Latex Rash     Past Medical History:   Diagnosis Date    Abnormal ECG 3 month ago    Abnormal Pap smear of cervix     ADHD     Adrenal mass     AT AGE 26    Allergic 2000    Moved to [place]    Allergic rhinitis     Anemia     Anxiety     Arthritis     Asthma     Depression     GERD (gastroesophageal reflux disease)     Headache 2002    Heart murmur     History of heart attack 05/03/2024    History of night terrors     Hyperlipidemia     Migraine     Myocardial infarction 03/2010    Ovarian cyst     Sleep apnea     Tear of meniscus of knee      Past Surgical History:   Procedure Laterality Date    ADRENAL GLAND SURGERY Left 01/01/2010    [facility] [place] DR RAO     COLONOSCOPY N/A 07/08/2024    Procedure: COLONOSCOPY;  Surgeon: Monroe Huang MD;  Location: [facility];  Service: Gastroenterology;  Laterality: N/A;  NORMAL COLONOSCOPY, NORMAL TI    ENDOSCOPY N/A 07/08/2024    Procedure: ESOPHAGOGASTRODUODENOSCOPY WITH BIOPSIES;  Surgeon: Monroe Huang MD;  Location: [facility] ENDOSCOPY;  Service: Gastroenterology;  Laterality: N/A;  HIATAL HERNIA    HYSTEROSCOPY LEEP BIOPSY  2011     Family History   Problem Relation Age of Onset    Hypertension Mother     Hyperlipidemia Mother     Thyroid disease Mother     Scoliosis Mother     Anemia Mother     Alcohol abuse Father         Sober 25 years now    No Known Problems Sister     No Known Problems Brother     Alcohol abuse Maternal Aunt         Dead    No Known Problems Maternal Uncle     No Known Problems Paternal Aunt     No Known Problems Paternal Uncle     Arthritis Maternal Grandmother     COPD Maternal Grandmother         Smoker    Restless legs syndrome Maternal Grandmother     Diabetes Maternal Grandfather     Emphysema Paternal Grandmother     COPD Paternal Grandfather         Smoker    No Known Problems Cousin     Ovarian cancer Maternal Great-Grandmother     Gallbladder disease Other         gallbladder problems run in the family    Colon cancer Other         colon cancer-- great great aunt    ADD / ADHD Neg Hx     Anxiety disorder Neg Hx     Bipolar disorder Neg Hx     Dementia Neg Hx     Depression Neg Hx     Drug abuse Neg Hx     OCD Neg Hx     Paranoid behavior Neg Hx     Schizophrenia Neg Hx     Seizures Neg Hx     Self-Injurious Behavior  Neg Hx     Suicide Attempts Neg Hx     Breast cancer Neg Hx     Uterine cancer Neg Hx        Home Medications:  Prior to Admission medications    Medication Sig Start Date End Date Taking? Authorizing Provider   albuterol sulfate  (90 Base) MCG/ACT inhaler Inhale 2 puffs Every 4 (Four) Hours As Needed for Wheezing. 9/24/24   Janice Polo, SANDRO    Azelastine HCl 137 MCG/SPRAY solution Instill 1-2 sprays each nostril twice a day as needed for runny nose, sneezing or increased nasal allergy symptoms. 24      busPIRone (BUSPAR) 5 MG tablet Take 1 tablet by mouth 2 (Two) Times a Day As Needed (Anxiety). 24   Kiersten Gallardo APRN   dicyclomine (BENTYL) 20 MG tablet Take 1 tablet by mouth Every 6 (Six) Hours. 24   Rosey Marrero APRN   EPINEPHrine (EPIPEN) 0.3 MG/0.3ML solution auto-injector injection Use as directed for acute allergic reaction. 10/11/24      escitalopram (Lexapro) 10 MG tablet Take 1 tablet by mouth Daily. 24   Kiersten Gallardo APRN   fexofenadine (Allegra Allergy) 180 MG tablet Take 1 tablet by mouth Daily. 24   Karen Durand MD   fluticasone (FLONASE) 50 MCG/ACT nasal spray instill 2 sprays in each nostril once daily 24   Hanna Pereira MD   ibuprofen (ADVIL,MOTRIN) 800 MG tablet Take 1 tablet by mouth Every 6 (Six) Hours As Needed for Mild Pain. 24   Hanna Pereira MD   montelukast (SINGULAIR) 10 MG tablet Take one tablet daily at bedtime 23      pantoprazole (Protonix) 40 MG EC tablet Take 1 tablet by mouth Daily. 24   Hanna Pereira MD   phentermine (ADIPEX-P) 37.5 MG tablet Take 1 tablet by mouth Every Morning Before Breakfast. 10/16/24   Erin Seth APRN   topiramate (TOPAMAX) 25 MG tablet Take 1 tablet by mouth Every Night for 14 days, THEN 2 tablets Every Night for 76 days. 10/16/24 1/16/25  Erin Seth APRN   traZODone (DESYREL) 50 MG tablet Take 1-2 tablets by mouth every night at bedtime. 24   Kiersten Gallardo APRN        Social History:   Social History     Tobacco Use    Smoking status: Former     Current packs/day: 0.00     Average packs/day: 2.0 packs/day for 23.0 years (46.0 ttl pk-yrs)     Types: Cigarettes     Start date: 2004     Quit date: 2020     Years since quittin.9     Passive exposure: Past     "Smokeless tobacco: Never   Vaping Use    Vaping status: Never Used   Substance Use Topics    Alcohol use: Not Currently     Alcohol/week: 3.0 standard drinks of alcohol     Types: 3 Glasses of wine per week     Comment: OCCASIONAL/SOCIAL    Drug use: Never         Review of Systems:  Review of Systems   Constitutional:  Negative for chills and fever.   HENT:  Negative for congestion, ear pain and sore throat.    Eyes:  Negative for pain.   Respiratory:  Negative for cough, chest tightness and shortness of breath.    Cardiovascular:  Negative for chest pain.   Gastrointestinal:  Negative for abdominal pain, diarrhea, nausea and vomiting.   Genitourinary:  Negative for flank pain and hematuria.   Musculoskeletal:  Positive for arthralgias. Negative for back pain, joint swelling, neck pain and neck stiffness.   Skin:  Negative for color change, pallor and rash.   Neurological:  Negative for seizures and headaches.   All other systems reviewed and are negative.       Physical Exam:  /82 (BP Location: Left arm, Patient Position: Sitting)   Pulse 90   Temp 97.8 °F (36.6 °C) (Oral)   Resp 19   Ht 165.1 cm (65\")   Wt (!) 138 kg (304 lb 0.2 oz)   SpO2 100%   BMI 50.59 kg/m²     Physical Exam  Vitals and nursing note reviewed.   Constitutional:       General: She is not in acute distress.     Appearance: Normal appearance. She is not ill-appearing or toxic-appearing.   HENT:      Head: Normocephalic and atraumatic.   Eyes:      General: No scleral icterus.     Conjunctiva/sclera: Conjunctivae normal.      Pupils: Pupils are equal, round, and reactive to light.   Cardiovascular:      Rate and Rhythm: Normal rate and regular rhythm.      Pulses: Normal pulses.   Pulmonary:      Effort: Pulmonary effort is normal. No respiratory distress.   Musculoskeletal:         General: Tenderness present. No swelling, deformity or signs of injury. Normal range of motion.        Arms:       Cervical back: Normal range of motion " and neck supple. No rigidity or tenderness.   Lymphadenopathy:      Cervical: No cervical adenopathy.   Skin:     General: Skin is warm and dry.      Capillary Refill: Capillary refill takes less than 2 seconds.      Findings: No bruising, erythema or rash.   Neurological:      General: No focal deficit present.      Mental Status: She is alert and oriented to person, place, and time. Mental status is at baseline.   Psychiatric:         Mood and Affect: Mood normal.         Behavior: Behavior normal.                  Medical Decision Making:      Comorbidities that affect care:    None    External Notes reviewed:    Myocardial infarction, allergic, anxiety, asthma, abnormal cervical Pap smear, ovarian cyst, allergic rhinitis, night terrors, anemia, hyperlipidemia, sleep apnea, heart murmur, headaches, meniscus tear, depression, migraines, GERD, ADHD, arthritis, adrenal mass, abnormal ECG      The following orders were placed and all results were independently analyzed by me:  Orders Placed This Encounter   Procedures    [facility] D[facility] 03.  Sling & Swathe    XR Shoulder 2+ View Right       Medications Given in the Emergency Department:  Medications   ketorolac (TORADOL) injection 60 mg (has no administration in time range)   acetaminophen (TYLENOL) tablet 975 mg (has no administration in time range)   dexAMETHasone (DECADRON) 10 MG/ML oral solution 10 mg (has no administration in time range)        ED Course:         Labs:    Lab Results (last 24 hours)       Procedure Component Value Units Date/Time    CBC & Differential [[id number]]  (Abnormal) Collected: 12/26/24 1430    Specimen: Blood from Arm, Right Updated: 12/26/24 5768    Narrative:      The following orders were created for panel order CBC & Differential.  Procedure                               Abnormality         Status                     ---------                               -----------         ------                     CBC Auto  Differential[[id number]]        Abnormal            Final result                 Please view results for these tests on the individual orders.    CBC Auto Differential [[id number]]  (Abnormal) Collected: 12/26/24 1430    Specimen: Blood from Arm, Right Updated: 12/26/24 1705     WBC 11.30 10*3/mm3      RBC 4.41 10*6/mm3      Hemoglobin 11.2 g/dL      Hematocrit 35.4 %      MCV 80.3 fL      MCH 25.4 pg      MCHC 31.6 g/dL      RDW 15.4 %      RDW-SD 44.3 fl      MPV 10.1 fL      Platelets 369 10*3/mm3      Neutrophil % 56.9 %      Lymphocyte % 27.0 %      Monocyte % 8.8 %      Eosinophil % 6.4 %      Basophil % 0.5 %      Immature Grans % 0.4 %      Neutrophils, Absolute 6.44 10*3/mm3      Lymphocytes, Absolute 3.05 10*3/mm3      Monocytes, Absolute 0.99 10*3/mm3      Eosinophils, Absolute 0.72 10*3/mm3      Basophils, Absolute 0.06 10*3/mm3      Immature Grans, Absolute 0.04 10*3/mm3      nRBC 0.0 /100 WBC              Imaging:    XR Shoulder 2+ View Right    Result Date: 12/27/2024  XR SHOULDER 2+ VW RIGHT Date of Exam: 12/27/2024 5:30 AM EST Indication: PAIN. No known injury. Comparison: None available. Findings: No fracture or dislocation. No AC joint separation. No bone erosion or destruction.     Negative shoulder. Electronically Signed: Roc Mandujano MD  12/27/2024 5:38 AM EST  Workstation ID: [id number]       Differential Diagnosis and Discussion:    Extremity Pain: Differential diagnosis includes but is not limited to soft tissue sprain, tendonitis, tendon injury, dislocation, fracture, deep vein thrombosis, arterial insufficiency, osteoarthritis, bursitis, and ligamentous damage.  Joint Pain: Differential diagnosis includes but is not limited to polyarticular arthritis, gout, tendinitis, hemarthrosis, septic arthritis, rheumatoid arthritis, bursitis, degenerative joint disease, joint effusion, autoimmune disorder, trauma, and occult neoplasm.    PROCEDURES:    X-ray were performed in the emergency department  and all X-ray impressions were independently interpreted by me.    No orders to display       Procedures    MDM  Number of Diagnoses or Management Options  Acute bursitis of right shoulder: new and requires workup     Amount and/or Complexity of Data Reviewed  Tests in the radiology section of CPT®: reviewed    Risk of Complications, Morbidity, and/or Mortality  Presenting problems: low  Diagnostic procedures: low  Management options: low    Patient Progress  Patient progress: stable             Patient Care Considerations:    LABS: I considered ordering labs, however considering the patient stable condition and complaint I did not feel it was necessary at this time.      Consultants/Shared Management Plan:    None    Social Determinants of Health:    Patient is independent, reliable, and has access to care.       Disposition and Care Coordination:    Discharged: The patient is suitable and stable for discharge with no need for consideration of admission.    I have explained the patient´s condition, diagnoses and treatment plan based on the information available to me at this time. I have answered questions and addressed any concerns. The patient has a good  understanding of the patient´s diagnosis, condition, and treatment plan as can be expected at this point. The vital signs have been stable. The patient´s condition is stable and appropriate for discharge from the emergency department.      The patient will pursue further outpatient evaluation with the primary care physician or other designated or consulting physician as outlined in the discharge instructions. They are agreeable to this plan of care and follow-up instructions have been explained in detail. The patient has received these instructions in written format and has expressed an understanding of the discharge instructions. The patient is aware that any significant change in condition or worsening of symptoms should prompt an immediate return to this or  the closest emergency department or call to 911.  I have explained discharge medications and the need for follow up with the patient/caretakers. This was also printed in the discharge instructions. Patient was discharged with the following medications and follow up:      Medication List        New Prescriptions      diclofenac 75 MG EC tablet  Commonly known as: VOLTAREN  Take 1 tablet by mouth 2 (Two) Times a Day.     predniSONE 50 MG tablet  Commonly known as: DELTASONE  Take 1 tablet by mouth Daily for 4 days.               Where to Get Your Medications        These medications were sent to [facility]/pharmacy #[id number] - [place], [place] - [address] - [phone] [facility] [phone]   [address] [place] [address]      Hours: 24-hours Phone: [phone]   diclofenac 75 MG EC tablet  predniSONE 50 MG tablet      Hanna Pereira MD  [address] [address] [phone]    Call   FOR FOLLOW UP       Final diagnoses:   Acute bursitis of right shoulder        ED Disposition       ED Disposition   Discharge    Condition   Stable    Comment   --               This medical record created using voice recognition software.             Mabel Epstein, APRN  12/27/24 0610

## 2024-12-27 NOTE — Clinical Note
[facility] EMERGENCY ROOM  [address] [place] [address]  Phone: [phone]  Fax: [phone]    Jessie Romero was seen and treated in our emergency department on 12/27/2024.  She may return to work on 12/30/2024.         Thank you for choosing [facility].    Mabel Epstein APRN

## 2024-12-30 DIAGNOSIS — D64.9 ANEMIA, UNSPECIFIED TYPE: Primary | ICD-10-CM

## 2025-01-13 RX ORDER — TOPIRAMATE 25 MG/1
TABLET, FILM COATED ORAL
Qty: 166 TABLET | Refills: 0 | Status: SHIPPED | OUTPATIENT
Start: 2025-01-13 | End: 2025-01-15

## 2025-01-15 ENCOUNTER — OFFICE VISIT (OUTPATIENT)
Dept: BARIATRICS/WEIGHT MGMT | Facility: CLINIC | Age: 40
End: 2025-01-15
Payer: COMMERCIAL

## 2025-01-15 VITALS
DIASTOLIC BLOOD PRESSURE: 85 MMHG | WEIGHT: 293 LBS | SYSTOLIC BLOOD PRESSURE: 134 MMHG | BODY MASS INDEX: 48.82 KG/M2 | HEART RATE: 66 BPM | HEIGHT: 65 IN | TEMPERATURE: 98 F

## 2025-01-15 DIAGNOSIS — E66.01 CLASS 3 SEVERE OBESITY WITH SERIOUS COMORBIDITY AND BODY MASS INDEX (BMI) OF 45.0 TO 49.9 IN ADULT, UNSPECIFIED OBESITY TYPE: Primary | ICD-10-CM

## 2025-01-15 DIAGNOSIS — F41.8 DEPRESSION WITH ANXIETY: ICD-10-CM

## 2025-01-15 DIAGNOSIS — E66.813 CLASS 3 SEVERE OBESITY DUE TO EXCESS CALORIES WITHOUT SERIOUS COMORBIDITY WITH BODY MASS INDEX (BMI) OF 50.0 TO 59.9 IN ADULT: ICD-10-CM

## 2025-01-15 DIAGNOSIS — E66.813 CLASS 3 SEVERE OBESITY WITH SERIOUS COMORBIDITY AND BODY MASS INDEX (BMI) OF 45.0 TO 49.9 IN ADULT, UNSPECIFIED OBESITY TYPE: Primary | ICD-10-CM

## 2025-01-15 DIAGNOSIS — K21.9 GERD WITHOUT ESOPHAGITIS: ICD-10-CM

## 2025-01-15 DIAGNOSIS — E66.01 CLASS 3 SEVERE OBESITY DUE TO EXCESS CALORIES WITHOUT SERIOUS COMORBIDITY WITH BODY MASS INDEX (BMI) OF 50.0 TO 59.9 IN ADULT: ICD-10-CM

## 2025-01-15 PROCEDURE — 99214 OFFICE O/P EST MOD 30 MIN: CPT | Performed by: NURSE PRACTITIONER

## 2025-01-15 RX ORDER — TOPIRAMATE 50 MG/1
50 TABLET, FILM COATED ORAL NIGHTLY
Qty: 90 TABLET | Refills: 0 | Status: SHIPPED | OUTPATIENT
Start: 2025-01-15

## 2025-01-15 RX ORDER — PHENTERMINE HYDROCHLORIDE 37.5 MG/1
37.5 TABLET ORAL
Qty: 30 TABLET | Refills: 2 | Status: SHIPPED | OUTPATIENT
Start: 2025-01-15

## 2025-01-15 NOTE — PROGRESS NOTES
[facility] BARIATRIC SURGERY  [address]  [facility] [id number] [phone]  [address]  [facility] [id number] [phone]  Dept: [phone]  1/15/2025      Jessie Romero.  [id number]  [id number]  1985  female      Chief Complaint   Patient presents with    Follow-up     Follow up ALEKSANDAR        Post-Op Bariatric Medicine:   Jessie Romero presents today for follow up today for provider supervised medical weight loss who has been taking phen/top for the last 3 months.     HPI:   Today's weight is 134 kg (296 lb) pounds, today's BMI is Body mass index is 49.32 kg/m²., she has a  loss of 7 pounds since the last visit.     Jessie Romero denies nausea, vomiting, reflux and reports that she did have some soda over the holidays and did get a bit of track eating some days. She denies any side effects      Diet and Exercise: Diet history reviewed and discussed with the patient. Weight loss/gains to date discussed with the patient. The patient states they are eating 60-70 grams of protein per day. She reports eating 3 meals per day, a typical portion size of 1/2 cup, eating 0-1 snacks per day, drinking 5-6 or more 8-oz. glasses of water per day, no carbonated beverage consumption and exercising regularly.     She is starting to get back to 8 hour eating window at home. She continues to track her dietary intake on the miladys. She has been consistent with protein intake. She is eating her protein first, some veggies, and a small portion of pasta or starch. She has been making her own chicken fingers and eating these throughout the week or making her own soup. She is primarily eating leaner, white meat.     Supplements: none     Review of Systems   Constitutional:  Positive for appetite change. Negative for fatigue and unexpected weight change.   HENT: Negative.     Eyes: Negative.    Respiratory: Negative.      Cardiovascular: Negative.  Negative for leg swelling.   Gastrointestinal:  Negative for abdominal distention, abdominal pain, constipation, diarrhea, nausea and vomiting.   Genitourinary:  Negative for difficulty urinating, frequency and urgency.   Musculoskeletal:  Negative for back pain.   Skin: Negative.    Psychiatric/Behavioral: Negative.     All other systems reviewed and are negative.      Patient Active Problem List   Diagnosis    Class 3 severe obesity with serious comorbidity and body mass index (BMI) of 45.0 to 49.9 in adult    Syncope    GERD without esophagitis    Asthma    Heartburn    Depression with anxiety    Hypersomnolence    Migraine with aura and without status migrainosus, not intractable    History of colitis    Altered bowel habits    Diarrhea    Generalized abdominal pain       Past Medical History:   Diagnosis Date    Abnormal ECG 3 month ago    Abnormal Pap smear of cervix     ADHD     Adrenal mass     AT AGE 26    Allergic 2000    Moved to [place]    Allergic rhinitis     Anemia     Anxiety     Arthritis     Asthma     Depression     GERD (gastroesophageal reflux disease)     Headache 2002    Heart murmur     History of heart attack 05/03/2024    History of night terrors     Hyperlipidemia     Migraine     Myocardial infarction 03/2010    Ovarian cyst     Sleep apnea     Tear of meniscus of knee        The following portions of the patient's history were reviewed and updated as appropriate: allergies, current medications, past family history, past medical history, past social history, past surgical history, and problem list.    Vitals:    01/15/25 1519   BP: 134/85   Pulse: 66   Temp: 98 °F (36.7 °C)       Physical Exam  Vitals and nursing note reviewed.   Constitutional:       Appearance: She is well-developed.   Neck:      Thyroid: No thyromegaly.   Cardiovascular:      Rate and Rhythm: Normal rate.   Pulmonary:      Effort: Pulmonary effort is normal. No respiratory distress.    Abdominal:      Palpations: Abdomen is soft.   Musculoskeletal:         General: No tenderness.   Skin:     General: Skin is warm and dry.   Neurological:      Mental Status: She is alert.   Psychiatric:         Behavior: Behavior normal.         Assessment:   The patient is doing well.   (E66.813,  E66.01,  Z68.42) Class 3 severe obesity with serious comorbidity and body mass index (BMI) of 45.0 to 49.9 in adult, unspecified obesity type    (K21.9) GERD without esophagitis    (F41.8) Depression with anxiety     Plan:     Continue phen-top  Encouraged patient to be sure to get plenty of lean protein per day through small frequent meals all with a protein source.   Activity restrictions: none.   Recommended patient be sure to get at least 70 grams of protein per day by eating small, frequent meals all with high lean protein choices. Be sure to limit/cut back on daily carbohydrate intake. Discussed with the patient the recommended amount of water per day to intake- half of body weight in ounces. Reviewed vitamin requirements. Be sure to do routine exercise, 150 minutes per week minimum, including both cardio and strength training.     Instructions / Recommendations: dietary counseling recommended, recommended a daily protein intake of  grams, vitamin supplement(s) recommended, recommended exercising at least 150 minutes per week, behavior modifications recommended and instructed to call the office for concerns, questions, or problems.     The patient was instructed to follow up in 3 months .      Total time spent during this encounter today was 35 minutes

## 2025-02-10 ENCOUNTER — OFFICE VISIT (OUTPATIENT)
Dept: BEHAVIORAL HEALTH | Facility: CLINIC | Age: 40
End: 2025-02-10
Payer: COMMERCIAL

## 2025-02-10 VITALS
HEART RATE: 66 BPM | HEIGHT: 65 IN | BODY MASS INDEX: 48.48 KG/M2 | DIASTOLIC BLOOD PRESSURE: 68 MMHG | WEIGHT: 291 LBS | SYSTOLIC BLOOD PRESSURE: 111 MMHG

## 2025-02-10 DIAGNOSIS — F43.10 POST TRAUMATIC STRESS DISORDER (PTSD): ICD-10-CM

## 2025-02-10 DIAGNOSIS — F51.01 PRIMARY INSOMNIA: ICD-10-CM

## 2025-02-10 DIAGNOSIS — F33.2 SEVERE EPISODE OF RECURRENT MAJOR DEPRESSIVE DISORDER, WITHOUT PSYCHOTIC FEATURES: Primary | ICD-10-CM

## 2025-02-10 DIAGNOSIS — F41.1 GENERALIZED ANXIETY DISORDER: ICD-10-CM

## 2025-02-10 PROCEDURE — 90836 PSYTX W PT W E/M 45 MIN: CPT

## 2025-02-10 PROCEDURE — 99214 OFFICE O/P EST MOD 30 MIN: CPT

## 2025-02-10 RX ORDER — BUSPIRONE HYDROCHLORIDE 5 MG/1
5 TABLET ORAL 2 TIMES DAILY PRN
Qty: 60 TABLET | Refills: 2 | Status: SHIPPED | OUTPATIENT
Start: 2025-02-10

## 2025-02-10 RX ORDER — ESCITALOPRAM OXALATE 10 MG/1
10 TABLET ORAL DAILY
Qty: 30 TABLET | Refills: 2 | Status: SHIPPED | OUTPATIENT
Start: 2025-02-10
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
## 2025-02-10 NOTE — PATIENT INSTRUCTIONS
1.  Please return to clinic at your next scheduled visit.  Please contact the clinic ([phone]) at least 24 hours prior in the event you need to cancel.  2.  Do no harm to yourself or others.    3.  Avoid alcohol and drugs.    4.  Take all medications as prescribed.  Please contact the clinic with any concerns. If you are in need of medication refills, please call the clinic at [phone].    5. Should you want to get in touch with your provider, LILLIAM Stanford, please contact the office ([phone]), and staff will be able to page Kiersten directly.  6. In the event you have personal crisis, contact the following crisis numbers: Suicide Prevention Hotline 1-192.129.6625; IRINA Helpline 2-774-268-HXKG; [facility] Emergency Room [phone]; text HELLO to 203942; or 895.     SPECIFIC RECOMMENDATIONS:     1.      Medications discussed at this encounter:     New Medications Ordered This Visit   Medications    busPIRone (BUSPAR) 5 MG tablet     Sig: Take 1 tablet by mouth 2 (Two) Times a Day As Needed (Anxiety).     Dispense:  60 tablet     Refill:  2    escitalopram (Lexapro) 10 MG tablet     Sig: Take 1 tablet by mouth Daily.     Dispense:  30 tablet     Refill:  2                       2.      Psychotherapy recommendations: We will continue therapy at future visits.     3.     Return to clinic: Return in about 3 months (around 5/10/2025).

## 2025-02-14 ENCOUNTER — OFFICE VISIT (OUTPATIENT)
Dept: SLEEP MEDICINE | Facility: HOSPITAL | Age: 40
End: 2025-02-14
Payer: COMMERCIAL

## 2025-02-14 VITALS
WEIGHT: 291.9 LBS | OXYGEN SATURATION: 98 % | HEART RATE: 64 BPM | SYSTOLIC BLOOD PRESSURE: 128 MMHG | DIASTOLIC BLOOD PRESSURE: 78 MMHG | BODY MASS INDEX: 49.83 KG/M2 | HEIGHT: 64 IN

## 2025-02-14 DIAGNOSIS — E66.813 CLASS 3 SEVERE OBESITY WITH SERIOUS COMORBIDITY AND BODY MASS INDEX (BMI) OF 50.0 TO 59.9 IN ADULT, UNSPECIFIED OBESITY TYPE: ICD-10-CM

## 2025-02-14 DIAGNOSIS — F41.9 ANXIETY: ICD-10-CM

## 2025-02-14 DIAGNOSIS — G47.33 OBSTRUCTIVE SLEEP APNEA, ADULT: Primary | ICD-10-CM

## 2025-02-14 DIAGNOSIS — F32.A DEPRESSION, UNSPECIFIED DEPRESSION TYPE: ICD-10-CM

## 2025-02-14 DIAGNOSIS — E66.01 CLASS 3 SEVERE OBESITY WITH SERIOUS COMORBIDITY AND BODY MASS INDEX (BMI) OF 50.0 TO 59.9 IN ADULT, UNSPECIFIED OBESITY TYPE: ICD-10-CM

## 2025-02-14 PROCEDURE — G0463 HOSPITAL OUTPT CLINIC VISIT: HCPCS
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
## 2025-02-24 NOTE — PLAN OF CARE
CBT/Supportive  Allowed patient to process topics such as has been in the  position for 6 months and things are going well.  Individual psychotherapy was provided utilizing solution focused techniques to restore adaptive functioning, provide symptom relief, discuss values and strengths, manage stress, identify triggers, recognize patterns of behavior, acknowledge sources of feelings and behaviors, assess symptoms, provide support, and discuss interpersonal conflicts. The therapeutic alliance was strengthened to encourage the patient to express their thoughts and feelings.     Psychotherapy time 40 minutes.  This time is exclusive to the therapy session and separate from the time spent on activities used to meet the criteria for the E/M service (history, exam, medical decision-making).  Goal is to strengthen defenses, promote problems solving, restore adaptive functioning, and provide symptom relief. Esteem building was enhanced through praise, reassurance, normalizing and encouragement. Coping skills were enhanced to build distress tolerance skills and emotional regulation. Allowed patient to freely discuss issues without interruption or judgement with unconditional positive regard, active listening skills, and empathy. Provided a safe, confidential environment to facilitate the development of a positive therapeutic relationship and encourage open, honest communication. Assisted patient in processing session content, acknowledged and normalized patient’s thoughts, feelings, and concerns by utilizing a person-centered approach in efforts to build appropriate rapport and a positive therapeutic relationship with open and honest communication. Plan to continue supportive psychotherapy in next appointment to provide symptom relief.

## 2025-02-26 ENCOUNTER — TELEMEDICINE (OUTPATIENT)
Dept: BARIATRICS/WEIGHT MGMT | Facility: CLINIC | Age: 40
End: 2025-02-26
Payer: COMMERCIAL

## 2025-02-26 DIAGNOSIS — Z71.3 ENCOUNTER FOR WEIGHT LOSS COUNSELING: ICD-10-CM

## 2025-02-26 DIAGNOSIS — E66.01 CLASS 3 SEVERE OBESITY DUE TO EXCESS CALORIES WITHOUT SERIOUS COMORBIDITY WITH BODY MASS INDEX (BMI) OF 50.0 TO 59.9 IN ADULT: Primary | ICD-10-CM

## 2025-02-26 DIAGNOSIS — E66.813 CLASS 3 SEVERE OBESITY DUE TO EXCESS CALORIES WITHOUT SERIOUS COMORBIDITY WITH BODY MASS INDEX (BMI) OF 50.0 TO 59.9 IN ADULT: Primary | ICD-10-CM

## 2025-02-26 NOTE — PROGRESS NOTES
" MEDICAL WEIGHT LOSS NUTRITION FOLLOW UP    Patient Name: Jessie Romero    YOB: 1985   Age: 39 y.o.  Sex: female  MRN: [id number]     ASSESSMENT    Anthropometric Measurements  Estimated body mass index is 50.08 kg/m² as calculated from the following:    Height as of 2/14/25: 162.6 cm (64.02\").    Weight as of 2/14/25: 132 kg (291 lb 14.4 oz).    Medical History  Past Medical History:   Diagnosis Date    Abnormal ECG 3 month ago    Abnormal Pap smear of cervix     ADHD     Adrenal mass     AT AGE 26    Allergic 2000    Moved to [place]    Allergic rhinitis     Anemia     Anxiety     Arthritis     Asthma     Depression     GERD (gastroesophageal reflux disease)     Headache 2002    Heart murmur     History of heart attack 05/03/2024    History of night terrors     Hyperlipidemia     Migraine     Myocardial infarction 03/2010    Ovarian cyst     Sleep apnea     Tear of meniscus of knee      Past Surgical History:   Procedure Laterality Date    ADRENAL GLAND SURGERY Left 01/01/2010    [facility] [place] RAO    COLONOSCOPY N/A 07/08/2024    Procedure: COLONOSCOPY;  Surgeon: Monroe Huang MD;  Location: [facility] ENDOSCOPY;  Service: Gastroenterology;  Laterality: N/A;  NORMAL COLONOSCOPY, NORMAL TI    ENDOSCOPY N/A 07/08/2024    Procedure: ESOPHAGOGASTRODUODENOSCOPY WITH BIOPSIES;  Surgeon: Monroe Huang MD;  Location: [facility] ENDOSCOPY;  Service: Gastroenterology;  Laterality: N/A;  HIATAL HERNIA    HYSTEROSCOPY LEEP BIOPSY  2011     Visit Narrative  Jessie Romero is participating in medical weight loss program under the supervision of a medical specialist and registered dietitian. Spoke with patient today for nutrition follow-up. Patient reports weight loss of 10 lbs at most recent doctor appointment. She does not have a scale at home. Reports clothes are fitting looser and she has gone down a pants size. The patient states they are making efforts to follow the " recommendations given at intake appointment including high lean protein, low carbohydrate and low fat diet. They are working on increasing fruits and vegetable intake, decreasing portion sizes of higher calorie foods and drinking 64 oz. water daily. Patient is working on reducing intake of fast foods, fried foods, sweets and high calorie beverages. She is feeling good, has more energy and having fewer cravings. Increasing protein and vegetable intake has helped her feel full. She will incorporate small portions of pasta on occasion. She moved back in with her mom and it has turned out to be a mutually beneficial arrangement. They are both working on healthier lifestyle with meal prep and keeping healthier snack options available. They are walking and exercising together.      DIAGNOSIS     Nutrition problem statement: Overweight/obesity related to multifactorial biochemical, behavioral and environmental contributors to disease as evidenced by BMI 50.08 kg/m².    INTERVENTION    Nutrition education and coaching for behavior change completed. Reviewed the appropriate dietary choices with the patient and provided guidance and suggestions for making necessary changes. Discussed sustainable habit changes and setting small, achievable goals that can be maintained long term. Recommended focus on eating protein first and aim for minimum of 70-80 grams per day. Limit or weigh/measure portions sizes for high fat and calorie foods including nuts. Discussed portion plate model for guidelines on putting together balanced meals. Suggested the option of keeping a food journal which will help patient become more aware of the nutritional value of food, establish starting point and identify areas for improvement. Aim for at least 64 oz water daily. Be sure to do routine exercise, 150 minutes per week minimum, including both cardio and strength training. Offered follow up for support and accountability.      MONITORING &  EVALUATION    Goals/Plan:   Continue current routine     Calculated TDEE: 2377 calories/day  Recommendations for weight loss:  calories, 1877 gm carbohydrate, 188-211 gm protein, 117-141 gm fat      Total time spent during this encounter today exceeded 30 minutes and includes preparing for the visit, reviewing tests, counseling and educating the patient/family/caregiver and documenting information in the medical record.    Electronically signed by:  Rama Blair RD   02/26/25 15:12 EST

## 2025-03-04 ENCOUNTER — APPOINTMENT (OUTPATIENT)
Dept: GENERAL RADIOLOGY | Facility: HOSPITAL | Age: 40
End: 2025-03-04
Payer: COMMERCIAL

## 2025-03-04 ENCOUNTER — HOSPITAL ENCOUNTER (EMERGENCY)
Facility: HOSPITAL | Age: 40
Discharge: HOME | End: 2025-03-04
Attending: EMERGENCY MEDICINE | Admitting: EMERGENCY MEDICINE
Payer: COMMERCIAL

## 2025-03-04 VITALS
WEIGHT: 293 LBS | SYSTOLIC BLOOD PRESSURE: 138 MMHG | DIASTOLIC BLOOD PRESSURE: 61 MMHG | RESPIRATION RATE: 20 BRPM | OXYGEN SATURATION: 97 % | TEMPERATURE: 98.9 F | HEIGHT: 65 IN | BODY MASS INDEX: 48.82 KG/M2 | HEART RATE: 69 BPM

## 2025-03-04 DIAGNOSIS — M25.551 RIGHT HIP PAIN: Primary | ICD-10-CM

## 2025-03-04 PROCEDURE — 25010000002 KETOROLAC TROMETHAMINE PER 15 MG: Performed by: NURSE PRACTITIONER

## 2025-03-04 PROCEDURE — 96372 THER/PROPH/DIAG INJ SC/IM: CPT

## 2025-03-04 PROCEDURE — 99283 EMERGENCY DEPT VISIT LOW MDM: CPT

## 2025-03-04 PROCEDURE — 73502 X-RAY EXAM HIP UNI 2-3 VIEWS: CPT

## 2025-03-04 PROCEDURE — 25010000002 METHYLPREDNISOLONE PER 125 MG: Performed by: NURSE PRACTITIONER

## 2025-03-04 RX ORDER — KETOROLAC TROMETHAMINE 10 MG/1
10 TABLET, FILM COATED ORAL EVERY 6 HOURS PRN
Qty: 20 TABLET | Refills: 0 | Status: SHIPPED | OUTPATIENT
Start: 2025-03-04

## 2025-03-04 RX ORDER — KETOROLAC TROMETHAMINE 30 MG/ML
30 INJECTION, SOLUTION INTRAMUSCULAR; INTRAVENOUS ONCE
Status: COMPLETED | OUTPATIENT
Start: 2025-03-04 | End: 2025-03-04

## 2025-03-04 RX ORDER — PREDNISONE 20 MG/1
60 TABLET ORAL DAILY
Qty: 9 TABLET | Refills: 0 | Status: SHIPPED | OUTPATIENT
Start: 2025-03-04 | End: 2025-03-07

## 2025-03-04 RX ADMIN — KETOROLAC TROMETHAMINE 30 MG: 30 INJECTION, SOLUTION INTRAMUSCULAR; INTRAVENOUS at 03:53

## 2025-03-04 RX ADMIN — METHYLPREDNISOLONE SODIUM SUCCINATE 80 MG: 125 INJECTION INTRAMUSCULAR; INTRAVENOUS at 03:53

## 2025-03-04 NOTE — DISCHARGE INSTRUCTIONS
Your imaging does not show any acute abnormality or known cause for your pain.    This may just be overwork or strain due to your increased recent activity or some mild tendinitis or bursitis.    Rest.  Ice.  Take medication as prescribed.    Follow-up with your PCP

## 2025-03-20 ENCOUNTER — TELEMEDICINE (OUTPATIENT)
Dept: BARIATRICS/WEIGHT MGMT | Facility: CLINIC | Age: 40
End: 2025-03-20
Payer: COMMERCIAL

## 2025-03-20 VITALS — WEIGHT: 289 LBS | BODY MASS INDEX: 48.15 KG/M2 | HEIGHT: 65 IN

## 2025-03-20 DIAGNOSIS — E66.01 CLASS 3 SEVERE OBESITY WITH SERIOUS COMORBIDITY AND BODY MASS INDEX (BMI) OF 45.0 TO 49.9 IN ADULT, UNSPECIFIED OBESITY TYPE: Primary | ICD-10-CM

## 2025-03-20 DIAGNOSIS — E66.813 CLASS 3 SEVERE OBESITY WITH SERIOUS COMORBIDITY AND BODY MASS INDEX (BMI) OF 45.0 TO 49.9 IN ADULT, UNSPECIFIED OBESITY TYPE: Primary | ICD-10-CM

## 2025-03-20 PROCEDURE — 99214 OFFICE O/P EST MOD 30 MIN: CPT | Performed by: NURSE PRACTITIONER

## 2025-03-20 NOTE — PROGRESS NOTES
[facility] BARIATRIC SURGERY  [address]  [facility] [id number] [phone]  [address]  [facility] [id number] [phone]  Dept: [phone]  3/20/2025      Jessie Romero.  [id number]  [id number]  1985  female      Chief Complaint   Patient presents with    Follow-up     [facility]: phen/top       Mode of Visit: Video  Location of patient: home  Location of provider: [facility] [address] [id number] [phone]   You have chosen to receive care through a telehealth visit.  Does the patient consent to use a video/audio connection for your medical care today? Yes  The visit included audio and video interaction. No technical issues occurred during this visit.      Post-Op Bariatric Medicine:   Jessie Romero presents today for follow up today for provider supervised medical weight loss. She has been taking the medication one to two days per week.     HPI:   Today's weight is 131 kg (289 lb) pounds, today's BMI is Body mass index is 48.15 kg/m²., she has a  loss of 25 pounds since the last visit.     Jessie Romero denies nausea, vomiting, reflux and reports that she is doing well with her phen/top combination.      Diet and Exercise: Diet history reviewed and discussed with the patient. Weight loss/gains to date discussed with the patient. The patient states they are eating 60 grams of protein per day. She reports eating 3 meals per day, a typical portion size of 1/2 cup, eating 1 snacks per day, drinking 5-6 or more 8-oz. glasses of water per day, no carbonated beverage consumption and exercising regularly.     Breakfast:  Snack: peanut butter and crackers  -good lunch  -something light for dinner    Review of Systems   Constitutional:  Positive for appetite change. Negative for fatigue and unexpected weight change.   HENT: Negative.     Eyes: Negative.    Respiratory: Negative.      Cardiovascular: Negative.  Negative for leg swelling.   Gastrointestinal:  Negative for abdominal distention, abdominal pain, constipation, diarrhea, nausea and vomiting.   Genitourinary:  Negative for difficulty urinating, frequency and urgency.   Musculoskeletal:  Negative for back pain.   Skin: Negative.    Psychiatric/Behavioral: Negative.     All other systems reviewed and are negative.      Patient Active Problem List   Diagnosis    Class 3 severe obesity with serious comorbidity and body mass index (BMI) of 45.0 to 49.9 in adult    Syncope    GERD without esophagitis    Asthma    Heartburn    Depression with anxiety    Hypersomnolence    Migraine with aura and without status migrainosus, not intractable    History of colitis    Altered bowel habits    Diarrhea    Generalized abdominal pain       Past Medical History:   Diagnosis Date    Abnormal ECG 3 month ago    Abnormal Pap smear of cervix     ADHD     Adrenal mass     AT AGE 26    Allergic 2000    Moved to [place]    Allergic rhinitis     Anemia     Anxiety     Arthritis     Asthma     Depression     GERD (gastroesophageal reflux disease)     Headache 2002    Heart murmur     History of heart attack 05/03/2024    History of night terrors     Hyperlipidemia     Migraine     Myocardial infarction 03/2010    Ovarian cyst     Sleep apnea     Tear of meniscus of knee        The following portions of the patient's history were reviewed and updated as appropriate: allergies, current medications, past family history, past medical history, past social history, past surgical history, and problem list.    There were no vitals filed for this visit.    Physical Exam  Vitals and nursing note reviewed.   Constitutional:       Appearance: She is well-developed.   Neck:      Thyroid: No thyromegaly.   Cardiovascular:      Rate and Rhythm: Normal rate.   Pulmonary:      Effort: Pulmonary effort is normal. No respiratory distress.   Abdominal:      Palpations: Abdomen is  soft.   Musculoskeletal:         General: No tenderness.   Skin:     General: Skin is warm and dry.   Neurological:      Mental Status: She is alert.   Psychiatric:         Behavior: Behavior normal.         Assessment:   The patient is doing well.   (E66.813,  E66.01,  Z68.42) Class 3 severe obesity with serious comorbidity and body mass index (BMI) of 45.0 to 49.9 in adult, unspecified obesity type     Plan:   Continue phen/top  Encouraged patient to be sure to get plenty of lean protein per day through small frequent meals all with a protein source.   Activity restrictions: none.   Recommended patient be sure to get at least 70 grams of protein per day by eating small, frequent meals all with high lean protein choices. Be sure to limit/cut back on daily carbohydrate intake. Discussed with the patient the recommended amount of water per day to intake- half of body weight in ounces. Reviewed vitamin requirements. Be sure to do routine exercise, 150 minutes per week minimum, including both cardio and strength training.     Instructions / Recommendations: dietary counseling recommended, recommended a daily protein intake of  grams, vitamin supplement(s) recommended, recommended exercising at least 150 minutes per week, behavior modifications recommended and instructed to call the office for concerns, questions, or problems.     The patient was instructed to follow up in 3 months .     The patient was counseled regarding. Total time spent during this encounter today was 25 minutes

## 2025-03-29 DIAGNOSIS — R10.84 GENERALIZED ABDOMINAL PAIN: Primary | ICD-10-CM

## 2025-03-31 RX ORDER — FLUTICASONE PROPIONATE 50 MCG
2 SPRAY, SUSPENSION (ML) NASAL DAILY
Qty: 16 G | Refills: 2 | Status: SHIPPED | OUTPATIENT
Start: 2025-03-31

## 2025-03-31 RX ORDER — DICYCLOMINE HCL 20 MG
20 TABLET ORAL EVERY 6 HOURS
Qty: 90 TABLET | Refills: 3 | Status: SHIPPED | OUTPATIENT
Start: 2025-03-31

## 2025-04-21 ENCOUNTER — TELEPHONE (OUTPATIENT)
Dept: GASTROENTEROLOGY | Facility: CLINIC | Age: 40
End: 2025-04-21
Payer: COMMERCIAL

## 2025-04-21 NOTE — TELEPHONE ENCOUNTER
Attempted to contact the patient in regards to her over due lab order, LVM for the patient to get that completed.

## 2025-04-30 ENCOUNTER — LAB (OUTPATIENT)
Facility: HOSPITAL | Age: 40
End: 2025-04-30
Payer: COMMERCIAL

## 2025-04-30 DIAGNOSIS — D64.9 ANEMIA, UNSPECIFIED TYPE: ICD-10-CM

## 2025-04-30 LAB
BASOPHILS # BLD AUTO: 0.06 10*3/MM3 (ref 0–0.2)
BASOPHILS NFR BLD AUTO: 0.4 % (ref 0–1.5)
DEPRECATED RDW RBC AUTO: 41 FL (ref 37–54)
EOSINOPHIL # BLD AUTO: 0.17 10*3/MM3 (ref 0–0.4)
EOSINOPHIL NFR BLD AUTO: 1.3 % (ref 0.3–6.2)
ERYTHROCYTE [DISTWIDTH] IN BLOOD BY AUTOMATED COUNT: 14.1 % (ref 12.3–15.4)
HCT VFR BLD AUTO: 41.6 % (ref 34–46.6)
HGB BLD-MCNC: 13.3 G/DL (ref 12–15.9)
IMM GRANULOCYTES # BLD AUTO: 0.06 10*3/MM3 (ref 0–0.05)
IMM GRANULOCYTES NFR BLD AUTO: 0.4 % (ref 0–0.5)
LYMPHOCYTES # BLD AUTO: 3.02 10*3/MM3 (ref 0.7–3.1)
LYMPHOCYTES NFR BLD AUTO: 22.6 % (ref 19.6–45.3)
MCH RBC QN AUTO: 25.6 PG (ref 26.6–33)
MCHC RBC AUTO-ENTMCNC: 32 G/DL (ref 31.5–35.7)
MCV RBC AUTO: 80.2 FL (ref 79–97)
MONOCYTES # BLD AUTO: 1.34 10*3/MM3 (ref 0.1–0.9)
MONOCYTES NFR BLD AUTO: 10 % (ref 5–12)
NEUTROPHILS NFR BLD AUTO: 65.3 % (ref 42.7–76)
NEUTROPHILS NFR BLD AUTO: 8.71 10*3/MM3 (ref 1.7–7)
NRBC BLD AUTO-RTO: 0 /100 WBC (ref 0–0.2)
PLATELET # BLD AUTO: 443 10*3/MM3 (ref 140–450)
PMV BLD AUTO: 10.6 FL (ref 6–12)
RBC # BLD AUTO: 5.19 10*6/MM3 (ref 3.77–5.28)
WBC NRBC COR # BLD AUTO: 13.36 10*3/MM3 (ref 3.4–10.8)

## 2025-04-30 PROCEDURE — 36415 COLL VENOUS BLD VENIPUNCTURE: CPT

## 2025-04-30 PROCEDURE — 85025 COMPLETE CBC W/AUTO DIFF WBC: CPT

## 2025-05-01 RX ORDER — ALBUTEROL SULFATE 90 UG/1
2 INHALANT RESPIRATORY (INHALATION) EVERY 4 HOURS PRN
Qty: 8.5 G | Refills: 2 | Status: SHIPPED | OUTPATIENT
Start: 2025-05-01

## 2025-05-01 RX ORDER — PANTOPRAZOLE SODIUM 40 MG/1
40 TABLET, DELAYED RELEASE ORAL DAILY
Qty: 90 TABLET | Refills: 1 | Status: SHIPPED | OUTPATIENT
Start: 2025-05-01

## 2025-05-06 ENCOUNTER — OFFICE VISIT (OUTPATIENT)
Dept: BEHAVIORAL HEALTH | Facility: CLINIC | Age: 40
End: 2025-05-06
Payer: COMMERCIAL

## 2025-05-06 VITALS
WEIGHT: 271 LBS | SYSTOLIC BLOOD PRESSURE: 143 MMHG | HEIGHT: 65 IN | BODY MASS INDEX: 45.15 KG/M2 | DIASTOLIC BLOOD PRESSURE: 86 MMHG | HEART RATE: 72 BPM

## 2025-05-06 DIAGNOSIS — F41.1 GENERALIZED ANXIETY DISORDER: ICD-10-CM

## 2025-05-06 DIAGNOSIS — F33.2 SEVERE EPISODE OF RECURRENT MAJOR DEPRESSIVE DISORDER, WITHOUT PSYCHOTIC FEATURES: Primary | ICD-10-CM

## 2025-05-06 DIAGNOSIS — F43.10 POST TRAUMATIC STRESS DISORDER (PTSD): ICD-10-CM

## 2025-05-06 NOTE — TREATMENT PLAN
Multi-Disciplinary Problems (from Behavioral Health Treatment Plan)      Active Problems       Problem: Anxiety  Start Date: 05/06/25      Problem Details: The patient self-scales this problem as a 2 with 10 being the worst.          Goal Priority Start Date Expected End Date End Date    Patient will develop and implement behavioral and cognitive strategies to reduce anxiety and irrational fears. -- 05/06/25 11/04/25 --    Goal Details: Functional status: Absent minimal symptoms, good functioning in all areas, interested and involved in a wide range of activities, socially effective, generally satisfied with life, no more than every day problems or concerns (81-98)  Prognosis: Excellent   Progress: stable and essentially resolved        Goal Intervention Frequency Start Date End Date    Help patient explore past emotional issues in relation to present anxiety. Q Month 05/06/25 --    Intervention Details: Duration of treatment until remission of symptoms.        Goal Intervention Frequency Start Date End Date    Help patient develop an awareness of their cognitive and physical responses to anxiety. Q Month 05/06/25 --    Intervention Details: Duration of treatment until remission of symptoms.                        Reviewed By       Kiersten Gallardo APRN 05/06/25 1190                     I have discussed and reviewed this treatment plan with the patient.

## 2025-05-06 NOTE — PLAN OF CARE
CBT/Supportive  Allowed patient to process topics such as has met someone online and is now engaged to be . She is feeling like this healthy relationship has help resolve much of her anxiety and depression symptoms. Individual psychotherapy was provided utilizing solution focused techniques to restore adaptive functioning, provide symptom relief, discuss values and strengths, manage stress, identify triggers, recognize patterns of behavior, acknowledge sources of feelings and behaviors, assess symptoms, provide support, and discuss interpersonal conflicts. The therapeutic alliance was strengthened to encourage the patient to express their thoughts and feelings.     Psychotherapy time 20 minutes.  This time is exclusive to the therapy session and separate from the time spent on activities used to meet the criteria for the E/M service (history, exam, medical decision-making).  Goal is to strengthen defenses, promote problems solving, restore adaptive functioning, and provide symptom relief. Esteem building was enhanced through praise, reassurance, normalizing and encouragement. Coping skills were enhanced to build distress tolerance skills and emotional regulation. Allowed patient to freely discuss issues without interruption or judgement with unconditional positive regard, active listening skills, and empathy. Provided a safe, confidential environment to facilitate the development of a positive therapeutic relationship and encourage open, honest communication. Assisted patient in processing session content, acknowledged and normalized patient’s thoughts, feelings, and concerns by utilizing a person-centered approach in efforts to build appropriate rapport and a positive therapeutic relationship with open and honest communication. Plan to continue supportive psychotherapy in next appointment to provide symptom relief.

## 2025-05-06 NOTE — PATIENT INSTRUCTIONS
1.  Please return to clinic at your next scheduled visit.  Please contact the clinic ([phone]) at least 24 hours prior in the event you need to cancel.  2.  Do no harm to yourself or others.    3.  Avoid alcohol and drugs.    4.  Take all medications as prescribed.  Please contact the clinic with any concerns. If you are in need of medication refills, please call the clinic at [phone].    5. Should you want to get in touch with your provider, LILLIAM Stanford, please contact the office ([phone]), and staff will be able to page Kiersten directly.  6. In the event you have personal crisis, contact the following crisis numbers: Suicide Prevention Hotline 1-333.500.6785; [facility] Helpline 4-796-776-CYII; [facility] Emergency Room [phone]; text HELLO to 679798; or 302.     SPECIFIC RECOMMENDATIONS:     1.      Medications discussed at this encounter:     No orders of the defined types were placed in this encounter.                      2.      Psychotherapy recommendations: We will continue therapy at future visits.     3.     Return to clinic: Return in about 3 months (around 8/6/2025).
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
## 2025-05-07 RX ORDER — TOPIRAMATE 50 MG/1
50 TABLET, FILM COATED ORAL NIGHTLY
Qty: 90 TABLET | Refills: 0 | Status: SHIPPED | OUTPATIENT
Start: 2025-05-07

## 2025-05-29 ENCOUNTER — OFFICE VISIT (OUTPATIENT)
Dept: INTERNAL MEDICINE | Facility: CLINIC | Age: 40
End: 2025-05-29
Payer: COMMERCIAL

## 2025-05-29 VITALS
DIASTOLIC BLOOD PRESSURE: 78 MMHG | OXYGEN SATURATION: 98 % | RESPIRATION RATE: 16 BRPM | HEART RATE: 80 BPM | TEMPERATURE: 97.1 F | BODY MASS INDEX: 45.38 KG/M2 | SYSTOLIC BLOOD PRESSURE: 142 MMHG | WEIGHT: 272.4 LBS

## 2025-05-29 DIAGNOSIS — R09.82 PND (POST-NASAL DRIP): ICD-10-CM

## 2025-05-29 DIAGNOSIS — J34.89 SINUS PRESSURE: ICD-10-CM

## 2025-05-29 DIAGNOSIS — J01.00 ACUTE MAXILLARY SINUSITIS, RECURRENCE NOT SPECIFIED: ICD-10-CM

## 2025-05-29 DIAGNOSIS — J02.9 SORE THROAT: Primary | ICD-10-CM

## 2025-05-29 DIAGNOSIS — R05.9 COUGH, UNSPECIFIED TYPE: ICD-10-CM

## 2025-05-29 PROCEDURE — 99214 OFFICE O/P EST MOD 30 MIN: CPT | Performed by: STUDENT IN AN ORGANIZED HEALTH CARE EDUCATION/TRAINING PROGRAM

## 2025-05-29 RX ORDER — AMOXICILLIN 875 MG/1
875 TABLET, COATED ORAL 2 TIMES DAILY
Qty: 14 TABLET | Refills: 0 | Status: SHIPPED | OUTPATIENT
Start: 2025-05-29

## 2025-05-29 NOTE — LETTER
Pamela 3, 2025     Patient: Jessie Romero   YOB: 1985   Date of Visit: 5/29/2025       To Whom It May Concern:    Ms. Jessie Romero was seen in the office today, 5/29/25. She may return to work in two days (6/2).          Sincerely,        Zully Smith MD    CC: No Recipients

## 2025-05-29 NOTE — PROGRESS NOTES
Chief Complaint  Cough, Nasal Congestion (Stated she has been like this for about 2 weeks ), and Dizziness (Notes she has vertigo, but has been dizzy recently. 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   Pt states 2 weeks ago she experienced  sore throat which resolved in 3d.   States then she started experiencing nasal congestion and cough.   Endorses sinus pressure.   Denies any fevers.   States yesterday she started experiencing lightheadedness and dizziness.   Endorses HA over the forehead, pressure around her eyes and sinus.   States she feels like she is swaying when she walks.   No ear pain.     Pt states she has been dx w/ vertigo (in past) and syncope (recent).   Pt states she is also about to start her period.     Past Medical History:   Diagnosis Date    Abnormal ECG 3 month ago    Abnormal Pap smear of cervix     ADHD     Adrenal mass     AT AGE 26    Allergic 2000    Moved to [place]    Allergic rhinitis     Anemia     Anxiety     Arthritis     Asthma     Depression     GERD (gastroesophageal reflux disease)     Headache 2002    Heart murmur     History of heart attack 05/03/2024    History of night terrors     Hyperlipidemia     Migraine     Myocardial infarction 03/2010    Ovarian cyst     Sleep apnea     Tear of meniscus of knee        Allergies:   Allergies   Allergen Reactions    Doxycycline Anaphylaxis     Shortness of air    Morphine Hallucinations    Adhesive Tape Rash     CANNOT USE EKG ADHESIVE STRIPS/RASH & ITCHING    Latex Rash          Past Surgical History:   Procedure Laterality Date    ADRENAL GLAND SURGERY Left 01/01/2010    [facility] [place] [facility]O    COLONOSCOPY N/A 07/08/2024    Procedure: COLONOSCOPY;  Surgeon: Monroe Huang MD;  Location: [facility] ENDOSCOPY;  Service: Gastroenterology;   Laterality: N/A;  NORMAL COLONOSCOPY, NORMAL TI    ENDOSCOPY N/A 2024    Procedure: ESOPHAGOGASTRODUODENOSCOPY WITH BIOPSIES;  Surgeon: Monroe Huang MD;  Location: [facility] ENDOSCOPY;  Service: Gastroenterology;  Laterality: N/A;  HIATAL HERNIA    HYSTEROSCOPY LEEP BIOPSY            Social History     Socioeconomic History    Marital status: Single   Tobacco Use    Smoking status: Former     Current packs/day: 0.00     Average packs/day: 2.0 packs/day for 23.0 years (46.0 ttl pk-yrs)     Types: Cigarettes     Start date: 2004     Quit date: 2020     Years since quittin.4     Passive exposure: Past    Smokeless tobacco: Never   Vaping Use    Vaping status: Never Used   Substance and Sexual Activity    Alcohol use: Not Currently     Alcohol/week: 3.0 standard drinks of alcohol     Types: 3 Glasses of wine per week     Comment: OCCASIONAL/SOCIAL    Drug use: Never    Sexual activity: Not Currently     Partners: Female, Male     Birth control/protection: None     Comment: CAN'T USE CONDOMS DUE TO LATEX ALLERGY         Family History   Problem Relation Age of Onset    Hypertension Mother     Hyperlipidemia Mother     Thyroid disease Mother     Scoliosis Mother     Anemia Mother     Alcohol abuse Father         Sober 25 years now    No Known Problems Sister     No Known Problems Brother     Alcohol abuse Maternal Aunt         Dead    No Known Problems Maternal Uncle     No Known Problems Paternal Aunt     No Known Problems Paternal Uncle     Arthritis Maternal Grandmother     COPD Maternal Grandmother         Smoker    Restless legs syndrome Maternal Grandmother     Diabetes Maternal Grandfather     Emphysema Paternal Grandmother     COPD Paternal Grandfather         Smoker    No Known Problems Cousin     Ovarian cancer Maternal Great-Grandmother     Gallbladder disease Other         gallbladder problems run in the family    Colon cancer Other         colon cancer-- great great aunt    ADD /  ADHD Neg Hx     Anxiety disorder Neg Hx     Bipolar disorder Neg Hx     Dementia Neg Hx     Depression Neg Hx     Drug abuse Neg Hx     OCD Neg Hx     Paranoid behavior Neg Hx     Schizophrenia Neg Hx     Seizures Neg Hx     Self-Injurious Behavior  Neg Hx     Suicide Attempts Neg Hx     Breast cancer Neg Hx     Uterine cancer Neg Hx           Health Maintenance Due   Topic Date Due    Pneumococcal Vaccine 0-49 (1 of 2 - PCV) Never done    TDAP/TD VACCINES (2 - Tdap) 09/16/2012    HEPATITIS C SCREENING  Never done    ANNUAL PHYSICAL  Never done    COVID-19 Vaccine (3 - 2024-25 season) 09/01/2024            Current Outpatient Medications:     albuterol sulfate  (90 Base) MCG/ACT inhaler, Inhale 2 puffs Every 4 (Four) Hours As Needed for Wheezing., Disp: 8.5 g, Rfl: 2    Azelastine HCl 137 MCG/SPRAY solution, Instill 1-2 sprays each nostril twice a day as needed for runny nose, sneezing or increased nasal allergy symptoms., Disp: 30 mL, Rfl: 0    busPIRone (BUSPAR) 5 MG tablet, Take 1 tablet by mouth 2 (Two) Times a Day As Needed (Anxiety). (Patient taking differently: Take 1 tablet by mouth As Needed (Anxiety).), Disp: 60 tablet, Rfl: 2    dicyclomine (BENTYL) 20 MG tablet, Take 1 tablet by mouth Every 6 (Six) Hours. (Patient taking differently: Take 1 tablet by mouth As Needed.), Disp: 90 tablet, Rfl: 3    EPINEPHrine (EPIPEN) 0.3 MG/0.3ML solution auto-injector injection, Use as directed for acute allergic reaction. (Patient taking differently: As Needed.), Disp: 2 each, Rfl: 3    fexofenadine (Allegra Allergy) 180 MG tablet, Take 1 tablet by mouth Daily., Disp: 90 tablet, Rfl: 0    fluticasone (FLONASE) 50 MCG/ACT nasal spray, Instill 2 sprays in each nostril once daily, Disp: 16 g, Rfl: 2    ketorolac (TORADOL) 10 MG tablet, Take 1 tablet by mouth Every 6 (Six) Hours As Needed for Mild Pain, Moderate Pain or Severe Pain. (Patient taking differently: Take 1 tablet by mouth As Needed for Mild Pain, Moderate  Pain or Severe Pain.), Disp: 20 tablet, Rfl: 0    montelukast (SINGULAIR) 10 MG tablet, Take one tablet daily at bedtime, Disp: 30 tablet, Rfl: 11    pantoprazole (Protonix) 40 MG EC tablet, Take 1 tablet by mouth Daily., Disp: 90 tablet, Rfl: 1    phentermine (ADIPEX-P) 37.5 MG tablet, Take 1 tablet by mouth Every Morning Before Breakfast. (Patient taking differently: Take 1 tablet by mouth As Needed.), Disp: 30 tablet, Rfl: 2    topiramate (TOPAMAX) 50 MG tablet, Take 1 tablet by mouth Every Night., Disp: 90 tablet, Rfl: 0    traZODone (DESYREL) 50 MG tablet, Take 1-2 tablets by mouth every night at bedtime. (Patient taking differently: Take 1-2 tablets by mouth As Needed.), Disp: 60 tablet, Rfl: 2    amoxicillin (AMOXIL) 875 MG tablet, Take 1 tablet by mouth 2 (Two) Times a Day for 7 days, Disp: 14 tablet, Rfl: 0    escitalopram (Lexapro) 10 MG tablet, Take 1 tablet by mouth Daily. (Patient not taking: Reported on 5/29/2025), Disp: 30 tablet, Rfl: 2      Immunization History   Administered Date(s) Administered    COVID-19 (MODERNA) 1st,2nd,3rd Dose Monovalent 09/04/2021, 10/06/2021    Fluad Quad 65+ 11/11/2021, 10/19/2022    Flublok 18+yrs 11/11/2021, 10/19/2022, 10/04/2023    Fluzone  >6mos 10/22/2024    Hep B, Adolescent or Pediatric 09/16/2002    Influenza, Unspecified 11/12/2021, 10/02/2023, 10/22/2024    Td (TDVAX) 09/16/2002         Review of Systems       Objective       Vitals:    05/29/25 0932   BP: 142/78   BP Location: Right arm   Patient Position: Sitting   Cuff Size: Adult   Pulse: 80   Resp: 16   Temp: 97.1 °F (36.2 °C)   TempSrc: Temporal   SpO2: 98%   Weight: 124 kg (272 lb 6.4 oz)     Physical Exam  Vitals reviewed.   Constitutional:       Appearance: Normal appearance.   HENT:      Head: Normocephalic and atraumatic.      Right Ear: Tympanic membrane normal.      Left Ear: Tympanic membrane normal.      Nose:      Right Turbinates: Swollen.      Left Turbinates: Swollen.      Right Sinus:  Maxillary sinus tenderness present.      Left Sinus: Maxillary sinus tenderness present.      Mouth/Throat:      Mouth: Mucous membranes are moist.   Eyes:      Extraocular Movements: Extraocular movements intact.      Conjunctiva/sclera: Conjunctivae normal.   Cardiovascular:      Rate and Rhythm: Normal rate and regular rhythm.      Pulses: Normal pulses.      Heart sounds: Normal heart sounds.   Pulmonary:      Effort: Pulmonary effort is normal. No respiratory distress.      Breath sounds: Normal breath sounds.   Musculoskeletal:         General: Normal range of motion.   Skin:     General: Skin is warm and dry.   Neurological:      General: No focal deficit present.      Mental Status: She is alert and oriented to person, place, and time.      Cranial Nerves: No cranial nerve deficit.   Psychiatric:         Mood and Affect: Mood normal.         Behavior: Behavior normal.         Thought Content: Thought content normal.             Result Review :                           Assessment and Plan        Assessment & Plan  Sore throat  Cough, unspecified type  PND (post-nasal drip)  Acute, cough and sore throat likely secondary to PND. Patient  encouraged to continue her allergy medications.        Sinus pressure  Acute maxillary sinusitis, recurrence not specified  Acute, amoxicillin sent in. Worrisome s/s warranting rtc discussed.   Orders:    amoxicillin (AMOXIL) 875 MG tablet; Take 1 tablet by mouth 2 (Two) Times a Day for 7 days              Follow Up     Return if symptoms worsen or fail to improve.    Patient was given instructions and counseling regarding her condition or for health maintenance advice. Please see specific information pulled into the AVS if appropriate.     Zully Smith MD   Internal Medicine-Pediatrics

## 2025-06-23 ENCOUNTER — APPOINTMENT (OUTPATIENT)
Dept: GENERAL RADIOLOGY | Facility: HOSPITAL | Age: 40
End: 2025-06-23
Payer: COMMERCIAL

## 2025-06-23 ENCOUNTER — HOSPITAL ENCOUNTER (EMERGENCY)
Facility: HOSPITAL | Age: 40
Discharge: HOME | End: 2025-06-23
Attending: EMERGENCY MEDICINE | Admitting: EMERGENCY MEDICINE
Payer: COMMERCIAL

## 2025-06-23 VITALS
RESPIRATION RATE: 16 BRPM | HEIGHT: 65 IN | BODY MASS INDEX: 45.8 KG/M2 | SYSTOLIC BLOOD PRESSURE: 130 MMHG | WEIGHT: 274.91 LBS | DIASTOLIC BLOOD PRESSURE: 78 MMHG | TEMPERATURE: 98.1 F | OXYGEN SATURATION: 96 % | HEART RATE: 62 BPM

## 2025-06-23 DIAGNOSIS — R07.89 CHEST DISCOMFORT: Primary | ICD-10-CM

## 2025-06-23 LAB
ALBUMIN SERPL-MCNC: 4.1 G/DL (ref 3.5–5.2)
ALBUMIN/GLOB SERPL: 1.4 G/DL
ALP SERPL-CCNC: 84 U/L (ref 39–117)
ALT SERPL W P-5'-P-CCNC: 8 U/L (ref 1–33)
ANION GAP SERPL CALCULATED.3IONS-SCNC: 12.3 MMOL/L (ref 5–15)
AST SERPL-CCNC: 11 U/L (ref 1–32)
BASOPHILS # BLD AUTO: 0.07 10*3/MM3 (ref 0–0.2)
BASOPHILS NFR BLD AUTO: 0.6 % (ref 0–1.5)
BILIRUB SERPL-MCNC: <0.2 MG/DL (ref 0–1.2)
BUN SERPL-MCNC: 11.9 MG/DL (ref 6–20)
BUN/CREAT SERPL: 17.8 (ref 7–25)
CALCIUM SPEC-SCNC: 8.7 MG/DL (ref 8.6–10.5)
CHLORIDE SERPL-SCNC: 104 MMOL/L (ref 98–107)
CO2 SERPL-SCNC: 19.7 MMOL/L (ref 22–29)
CREAT SERPL-MCNC: 0.67 MG/DL (ref 0.57–1)
DEPRECATED RDW RBC AUTO: 44.8 FL (ref 37–54)
EGFRCR SERPLBLD CKD-EPI 2021: 114.9 ML/MIN/1.73
EOSINOPHIL # BLD AUTO: 0.16 10*3/MM3 (ref 0–0.4)
EOSINOPHIL NFR BLD AUTO: 1.3 % (ref 0.3–6.2)
ERYTHROCYTE [DISTWIDTH] IN BLOOD BY AUTOMATED COUNT: 15.4 % (ref 12.3–15.4)
GEN 5 1HR TROPONIN T REFLEX: <6 NG/L
GLOBULIN UR ELPH-MCNC: 2.9 GM/DL
GLUCOSE SERPL-MCNC: 98 MG/DL (ref 65–99)
HCT VFR BLD AUTO: 38.4 % (ref 34–46.6)
HGB BLD-MCNC: 12.1 G/DL (ref 12–15.9)
HOLD SPECIMEN: NORMAL
HOLD SPECIMEN: NORMAL
IMM GRANULOCYTES # BLD AUTO: 0.05 10*3/MM3 (ref 0–0.05)
IMM GRANULOCYTES NFR BLD AUTO: 0.4 % (ref 0–0.5)
LIPASE SERPL-CCNC: 31 U/L (ref 13–60)
LYMPHOCYTES # BLD AUTO: 2.76 10*3/MM3 (ref 0.7–3.1)
LYMPHOCYTES NFR BLD AUTO: 23.3 % (ref 19.6–45.3)
MAGNESIUM SERPL-MCNC: 2.1 MG/DL (ref 1.6–2.6)
MCH RBC QN AUTO: 25.3 PG (ref 26.6–33)
MCHC RBC AUTO-ENTMCNC: 31.5 G/DL (ref 31.5–35.7)
MCV RBC AUTO: 80.3 FL (ref 79–97)
MONOCYTES # BLD AUTO: 1.05 10*3/MM3 (ref 0.1–0.9)
MONOCYTES NFR BLD AUTO: 8.9 % (ref 5–12)
NEUTROPHILS NFR BLD AUTO: 65.5 % (ref 42.7–76)
NEUTROPHILS NFR BLD AUTO: 7.77 10*3/MM3 (ref 1.7–7)
NRBC BLD AUTO-RTO: 0 /100 WBC (ref 0–0.2)
NT-PROBNP SERPL-MCNC: <36 PG/ML (ref 0–450)
PLATELET # BLD AUTO: 377 10*3/MM3 (ref 140–450)
PMV BLD AUTO: 10 FL (ref 6–12)
POTASSIUM SERPL-SCNC: 3.6 MMOL/L (ref 3.5–5.2)
PROT SERPL-MCNC: 7 G/DL (ref 6–8.5)
RBC # BLD AUTO: 4.78 10*6/MM3 (ref 3.77–5.28)
SODIUM SERPL-SCNC: 136 MMOL/L (ref 136–145)
TROPONIN T NUMERIC DELTA: NORMAL
TROPONIN T SERPL HS-MCNC: <6 NG/L
WBC NRBC COR # BLD AUTO: 11.86 10*3/MM3 (ref 3.4–10.8)
WHOLE BLOOD HOLD COAG: NORMAL
WHOLE BLOOD HOLD SPECIMEN: NORMAL

## 2025-06-23 PROCEDURE — 83735 ASSAY OF MAGNESIUM: CPT

## 2025-06-23 PROCEDURE — 84484 ASSAY OF TROPONIN QUANT: CPT | Performed by: EMERGENCY MEDICINE

## 2025-06-23 PROCEDURE — 84484 ASSAY OF TROPONIN QUANT: CPT

## 2025-06-23 PROCEDURE — 93005 ELECTROCARDIOGRAM TRACING: CPT

## 2025-06-23 PROCEDURE — 71045 X-RAY EXAM CHEST 1 VIEW: CPT

## 2025-06-23 PROCEDURE — 99284 EMERGENCY DEPT VISIT MOD MDM: CPT

## 2025-06-23 PROCEDURE — 93005 ELECTROCARDIOGRAM TRACING: CPT | Performed by: EMERGENCY MEDICINE

## 2025-06-23 PROCEDURE — 80053 COMPREHEN METABOLIC PANEL: CPT

## 2025-06-23 PROCEDURE — 83880 ASSAY OF NATRIURETIC PEPTIDE: CPT

## 2025-06-23 PROCEDURE — 83690 ASSAY OF LIPASE: CPT

## 2025-06-23 PROCEDURE — 85025 COMPLETE CBC W/AUTO DIFF WBC: CPT

## 2025-06-23 PROCEDURE — 36415 COLL VENOUS BLD VENIPUNCTURE: CPT

## 2025-06-23 RX ORDER — SODIUM CHLORIDE 0.9 % (FLUSH) 0.9 %
10 SYRINGE (ML) INJECTION AS NEEDED
Status: DISCONTINUED | OUTPATIENT
Start: 2025-06-23 | End: 2025-06-23 | Stop reason: HOSPADM

## 2025-06-23 RX ORDER — ASPIRIN 81 MG/1
324 TABLET, CHEWABLE ORAL ONCE
Status: DISCONTINUED | OUTPATIENT
Start: 2025-06-23 | End: 2025-06-23 | Stop reason: HOSPADM

## 2025-06-23 NOTE — ED PROVIDER NOTES
Time: 2:15 PM EDT  Date of encounter:  6/23/2025  Independent Historian/Clinical History and Information was obtained by:   Patient    History is limited by: N/A    Chief Complaint   Patient presents with    Shortness of Breath    Chest Pain    Headache         History of Present Illness:  Patient is a 39 y.o. year old female who presents to the emergency department for evaluation of shortness of breath, chest pain, back pain, headache and elevated blood pressure.  For the past couple days has had worsening symptoms.  Reports a history of asthma and has been taking her inhaler without significant relief.  Currently overcoming sinus and ear infection.  Has had chills, no fever.  LILLIAM Kraus    Patient Care Team  Primary Care Provider: Hanna Pereira MD    Past Medical History:     Allergies   Allergen Reactions    Doxycycline Anaphylaxis     Shortness of air    Morphine Hallucinations    Adhesive Tape Rash     CANNOT USE EKG ADHESIVE STRIPS/RASH & ITCHING    Latex Rash     Past Medical History:   Diagnosis Date    Abnormal ECG 3 month ago    Abnormal Pap smear of cervix     ADHD     Adrenal mass     AT AGE 26    Allergic 2000    Moved to [place]    Allergic rhinitis     Anemia     Anxiety     Arthritis     Asthma     Depression     GERD (gastroesophageal reflux disease)     Headache 2002    Heart murmur     History of heart attack 05/03/2024    History of night terrors     Hyperlipidemia     Migraine     Myocardial infarction 03/2010    Ovarian cyst     Sleep apnea     Tear of meniscus of knee      Past Surgical History:   Procedure Laterality Date    ADRENAL GLAND SURGERY Left 01/01/2010    [facility] [place] DR RAO    COLONOSCOPY N/A 07/08/2024    Procedure: COLONOSCOPY;  Surgeon: Monroe Huang MD;  Location: [facility] ENDOSCOPY;  Service: Gastroenterology;  Laterality: N/A;  NORMAL COLONOSCOPY, NORMAL TI    ENDOSCOPY N/A 07/08/2024    Procedure: ESOPHAGOGASTRODUODENOSCOPY WITH BIOPSIES;   Surgeon: Monroe Huang MD;  Location: [facility] ENDOSCOPY;  Service: Gastroenterology;  Laterality: N/A;  HIATAL HERNIA    HYSTEROSCOPY LEEP BIOPSY  2011     Family History   Problem Relation Age of Onset    Hypertension Mother     Hyperlipidemia Mother     Thyroid disease Mother     Scoliosis Mother     Anemia Mother     Alcohol abuse Father         Sober 25 years now    No Known Problems Sister     No Known Problems Brother     Alcohol abuse Maternal Aunt         Dead    No Known Problems Maternal Uncle     No Known Problems Paternal Aunt     No Known Problems Paternal Uncle     Arthritis Maternal Grandmother     COPD Maternal Grandmother         Smoker    Restless legs syndrome Maternal Grandmother     Diabetes Maternal Grandfather     Emphysema Paternal Grandmother     COPD Paternal Grandfather         Smoker    No Known Problems Cousin     Ovarian cancer Maternal Great-Grandmother     Gallbladder disease Other         gallbladder problems run in the family    Colon cancer Other         colon cancer-- great great aunt    ADD / ADHD Neg Hx     Anxiety disorder Neg Hx     Bipolar disorder Neg Hx     Dementia Neg Hx     Depression Neg Hx     Drug abuse Neg Hx     OCD Neg Hx     Paranoid behavior Neg Hx     Schizophrenia Neg Hx     Seizures Neg Hx     Self-Injurious Behavior  Neg Hx     Suicide Attempts Neg Hx     Breast cancer Neg Hx     Uterine cancer Neg Hx        Home Medications:  Prior to Admission medications    Medication Sig Start Date End Date Taking? Authorizing Provider   albuterol sulfate  (90 Base) MCG/ACT inhaler Inhale 2 puffs Every 4 (Four) Hours As Needed for Wheezing. 5/1/25   Janice Polo, PA-C   amoxicillin (AMOXIL) 875 MG tablet Take 1 tablet by mouth 2 (Two) Times a Day for 7 days 5/29/25   Zully Smith MD   Azelastine HCl 137 MCG/SPRAY solution Instill 1-2 sprays each nostril twice a day as needed for runny nose, sneezing or increased nasal allergy symptoms. 8/8/24       busPIRone (BUSPAR) 5 MG tablet Take 1 tablet by mouth 2 (Two) Times a Day As Needed (Anxiety).  Patient taking differently: Take 1 tablet by mouth As Needed (Anxiety). 2/10/25   Kiersten Gallardo APRN   dicyclomine (BENTYL) 20 MG tablet Take 1 tablet by mouth Every 6 (Six) Hours.  Patient taking differently: Take 1 tablet by mouth As Needed. 3/31/25   Rosey Marrero APRN   EPINEPHrine (EPIPEN) 0.3 MG/0.3ML solution auto-injector injection Use as directed for acute allergic reaction.  Patient taking differently: As Needed. 10/11/24      escitalopram (Lexapro) 10 MG tablet Take 1 tablet by mouth Daily.  Patient not taking: Reported on 5/29/2025 2/10/25   Kiersten Gallardo APRN   fexofenadine (Allegra Allergy) 180 MG tablet Take 1 tablet by mouth Daily. 6/5/24   Karen Durand MD   fluticasone (FLONASE) 50 MCG/ACT nasal spray Instill 2 sprays in each nostril once daily 3/31/25   Hanna Pereira MD   ketorolac (TORADOL) 10 MG tablet Take 1 tablet by mouth Every 6 (Six) Hours As Needed for Mild Pain, Moderate Pain or Severe Pain.  Patient taking differently: Take 1 tablet by mouth As Needed for Mild Pain, Moderate Pain or Severe Pain. 3/4/25   Chiqui Renee APRN   montelukast (SINGULAIR) 10 MG tablet Take one tablet daily at bedtime 7/12/23      pantoprazole (Protonix) 40 MG EC tablet Take 1 tablet by mouth Daily. 5/1/25   Hanna Pereira MD   phentermine (ADIPEX-P) 37.5 MG tablet Take 1 tablet by mouth Every Morning Before Breakfast.  Patient taking differently: Take 1 tablet by mouth As Needed. 1/15/25   Erin Seth APRN   topiramate (TOPAMAX) 50 MG tablet Take 1 tablet by mouth Every Night. 5/7/25   Erin Seth APRN   traZODone (DESYREL) 50 MG tablet Take 1-2 tablets by mouth every night at bedtime.  Patient taking differently: Take 1-2 tablets by mouth As Needed. 11/11/24   Kiersten Gallardo APRN        Social History:   Social History     Tobacco Use    Smoking status: Former  "    Current packs/day: 0.00     Average packs/day: 2.0 packs/day for 23.0 years (46.0 ttl pk-yrs)     Types: Cigarettes     Start date: 2004     Quit date: 2020     Years since quittin.4     Passive exposure: Past    Smokeless tobacco: Never   Vaping Use    Vaping status: Never Used   Substance Use Topics    Alcohol use: Not Currently     Alcohol/week: 3.0 standard drinks of alcohol     Types: 3 Glasses of wine per week     Comment: OCCASIONAL/SOCIAL    Drug use: Never         Review of Systems:  Review of Systems   Constitutional:  Negative for chills and fever.   HENT:  Negative for congestion, ear pain and sore throat.    Eyes:  Negative for pain.   Respiratory:  Positive for shortness of breath. Negative for cough and chest tightness.    Cardiovascular:  Positive for chest pain.   Gastrointestinal:  Negative for abdominal pain, diarrhea, nausea and vomiting.   Genitourinary:  Negative for flank pain and hematuria.   Musculoskeletal:  Negative for joint swelling.   Skin:  Negative for pallor.   Neurological:  Negative for seizures and headaches.   All other systems reviewed and are negative.       Physical Exam:  /78 (BP Location: Left arm, Patient Position: Sitting)   Pulse 62   Temp 98.1 °F (36.7 °C) (Oral)   Resp 16   Ht 165.1 cm (65\")   Wt 125 kg (274 lb 14.6 oz)   LMP 2025 (Approximate)   SpO2 96%   BMI 45.75 kg/m²         Physical Exam  Vitals and nursing note reviewed.   Constitutional:       General: She is not in acute distress.     Appearance: Normal appearance. She is not toxic-appearing.   HENT:      Head: Normocephalic and atraumatic.      Mouth/Throat:      Mouth: Mucous membranes are moist.   Eyes:      General: No scleral icterus.     Pupils: Pupils are equal, round, and reactive to light.   Cardiovascular:      Rate and Rhythm: Normal rate and regular rhythm.      Pulses: Normal pulses.      Heart sounds: Normal heart sounds.   Pulmonary:      Effort: Pulmonary " effort is normal. No respiratory distress.      Breath sounds: Normal breath sounds.   Abdominal:      General: Abdomen is flat. There is no distension.      Palpations: Abdomen is soft.      Tenderness: There is no abdominal tenderness.   Musculoskeletal:         General: Normal range of motion.      Cervical back: Normal range of motion and neck supple.   Skin:     General: Skin is warm and dry.   Neurological:      General: No focal deficit present.      Mental Status: She is alert and oriented to person, place, and time. Mental status is at baseline.   Psychiatric:         Mood and Affect: Mood normal.         Behavior: Behavior normal.                            Medical Decision Making:      Comorbidities that affect care:    Heart murmur /anxiety    External Notes reviewed:    Previous Clinic Note: Office visit with behavioral health      The following orders were placed and all results were independently analyzed by me:  Orders Placed This Encounter   Procedures    XR Chest 1 View    [place] Draw    High Sensitivity Troponin T    Comprehensive Metabolic Panel    Lipase    BNP    Magnesium    CBC Auto Differential    High Sensitivity Troponin T 1Hr    Undress & Gown    Continuous Pulse Oximetry    ECG 12 Lead ED Triage Standing Order; Chest Pain    CBC & Differential    Green Top (Gel)    Lavender Top    Gold Top - SST    Light Blue Top       Medications Given in the Emergency Department:  Medications - No data to display       ED Course:    The patient was initially evaluated in the triage area where orders were placed. The patient was later dispositioned by Nguyễn Brunson DO.      The patient was advised to stay for completion of workup which includes but is not limited to communication of labs and radiological results, reassessment and plan. The patient was advised that leaving prior to disposition by a provider could result in critical findings that are not communicated to the patient.            EKG:  Sinus rhythm rate of 70 bpm  No acute ischemic changes are noted    Labs:    Lab Results (last 24 hours)       Procedure Component Value Units Date/Time    High Sensitivity Troponin T [[id number]]  (Normal) Collected: 06/23/25 1237    Specimen: Blood Updated: 06/23/25 1312     HS Troponin T <6 ng/L     Narrative:      High Sensitive Troponin T Reference Range:  <14.0 ng/L- Negative Female for AMI  <22.0 ng/L- Negative Male for AMI  >=14 - Abnormal Female indicating possible myocardial injury.  >=22 - Abnormal Male indicating possible myocardial injury.   Clinicians would have to utilize clinical acumen, EKG, Troponin, and serial changes to determine if it is an Acute Myocardial Infarction or myocardial injury due to an underlying chronic condition.         CBC & Differential [[id number]]  (Abnormal) Collected: 06/23/25 1237    Specimen: Blood Updated: 06/23/25 1248    Narrative:      The following orders were created for panel order CBC & Differential.  Procedure                               Abnormality         Status                     ---------                               -----------         ------                     CBC Auto Differential[[id number]]        Abnormal            Final result                 Please view results for these tests on the individual orders.    Comprehensive Metabolic Panel [[id number]]  (Abnormal) Collected: 06/23/25 1237    Specimen: Blood Updated: 06/23/25 1312     Glucose 98 mg/dL      BUN 11.9 mg/dL      Creatinine 0.67 mg/dL      Sodium 136 mmol/L      Potassium 3.6 mmol/L      Chloride 104 mmol/L      CO2 19.7 mmol/L      Calcium 8.7 mg/dL      Total Protein 7.0 g/dL      Albumin 4.1 g/dL      ALT (SGPT) 8 U/L      AST (SGOT) 11 U/L      Alkaline Phosphatase 84 U/L      Total Bilirubin <0.2 mg/dL      Globulin 2.9 gm/dL      A/G Ratio 1.4 g/dL      BUN/Creatinine Ratio 17.8     Anion Gap 12.3 mmol/L      eGFR 114.9 mL/min/1.73     Narrative:      GFR Categories in Chronic Kidney  Disease (CKD)              GFR Category          GFR (mL/min/1.73)    Interpretation  G1                    90 or greater        Normal or high (1)  G2                    60-89                Mild decrease (1)  G3a                   45-59                Mild to moderate decrease  G3b                   30-44                Moderate to severe decrease  G4                    15-29                Severe decrease  G5                    14 or less           Kidney failure    (1)In the absence of evidence of kidney disease, neither GFR category G1 or G2 fulfill the criteria for CKD.    eGFR calculation 2021 CKD-EPI creatinine equation, which does not include race as a factor    Lipase [[id number]]  (Normal) Collected: 06/23/25 1237    Specimen: Blood Updated: 06/23/25 1312     Lipase 31 U/L     BNP [[id number]]  (Normal) Collected: 06/23/25 1237    Specimen: Blood Updated: 06/23/25 1308     proBNP <36.0 pg/mL     Narrative:      This assay is used as an aid in the diagnosis of individuals suspected of having heart failure. It can be used as an aid in the diagnosis of acute decompensated heart failure (ADHF) in patients presenting with signs and symptoms of ADHF to the emergency department (ED). In addition, NT-proBNP of <300 pg/mL indicates ADHF is not likely.    Age Range Result Interpretation  NT-proBNP Concentration (pg/mL:      <50             Positive            >450                   Gray                 300-450                    Negative             <300    50-75           Positive            >900                  Gray                300-900                  Negative            <300      >75             Positive            >1800                  Gray                300-1800                  Negative            <300    Magnesium [[id number]]  (Normal) Collected: 06/23/25 1237    Specimen: Blood Updated: 06/23/25 1312     Magnesium 2.1 mg/dL     CBC Auto Differential [[id number]]  (Abnormal) Collected: 06/23/25 1237     Specimen: Blood Updated: 06/23/25 1248     WBC 11.86 10*3/mm3      RBC 4.78 10*6/mm3      Hemoglobin 12.1 g/dL      Hematocrit 38.4 %      MCV 80.3 fL      MCH 25.3 pg      MCHC 31.5 g/dL      RDW 15.4 %      RDW-SD 44.8 fl      MPV 10.0 fL      Platelets 377 10*3/mm3      Neutrophil % 65.5 %      Lymphocyte % 23.3 %      Monocyte % 8.9 %      Eosinophil % 1.3 %      Basophil % 0.6 %      Immature Grans % 0.4 %      Neutrophils, Absolute 7.77 10*3/mm3      Lymphocytes, Absolute 2.76 10*3/mm3      Monocytes, Absolute 1.05 10*3/mm3      Eosinophils, Absolute 0.16 10*3/mm3      Basophils, Absolute 0.07 10*3/mm3      Immature Grans, Absolute 0.05 10*3/mm3      nRBC 0.0 /100 WBC     High Sensitivity Troponin T 1Hr [[id number]] Collected: 06/23/25 1535    Specimen: Blood Updated: 06/23/25 1604     HS Troponin T <6 ng/L      Troponin T Numeric Delta --     Comment: Unable to calculate.       Narrative:      High Sensitive Troponin T Reference Range:  <14.0 ng/L- Negative Female for AMI  <22.0 ng/L- Negative Male for AMI  >=14 - Abnormal Female indicating possible myocardial injury.  >=22 - Abnormal Male indicating possible myocardial injury.   Clinicians would have to utilize clinical acumen, EKG, Troponin, and serial changes to determine if it is an Acute Myocardial Infarction or myocardial injury due to an underlying chronic condition.                  Imaging:    XR Chest 1 View  Result Date: 6/23/2025  XR CHEST 1 VW Date of Exam: 6/23/2025 1:17 PM EDT Indication: Chest Pain Triage Protocol Comparison: 9/9/2024 Findings: Linear scarring or atelectasis again noted in the left base. No other focal pulmonary opacities. Pulmonary vascularity, heart size and mediastinal contour appear within normal limits. No pneumothorax or large effusion identified. Aortic vascular calcification is noted. Leftward concave scoliotic curvature again noted.     Impression: Linear scarring or atelectasis in the left base. Electronically Signed:  Monroe Falk MD  6/23/2025 1:34 PM EDT  Workstation ID: [id number]        Differential Diagnosis and Discussion:      Chest Pain:  Based on the patient's signs and symptoms, I considered aortic dissection, myocardial infaction, pulmonary embolism, cardiac tamponade, pericarditis, pneumothorax, musculoskeletal chest pain and other differential diagnosis as an etiology of the patient's chest pain.     PROCEDURES:    Labs were collected in the emergency department and all labs were reviewed and interpreted by me.  X-ray were performed in the emergency department and all X-ray impressions were independently interpreted by me.  An EKG was performed and the EKG was interpreted by me.    ECG 12 Lead ED Triage Standing Order; Chest Pain   Preliminary Result   HEART RATE=70  bpm   RR Enrvkzre=137  ms   NC Yblghfiy=047  ms   P Horizontal Axis=-9  deg   P Front Axis=50  deg   QRSD Interval=95  ms   QT Bqgtyvde=220  ms   EXbC=264  ms   QRS Axis=20  deg   T Wave Axis=-1  deg   - BORDERLINE ECG -   Sinus rhythm   Low voltage, precordial leads   Borderline T abnormalities, diffuse leads   Date and Time of Study:2025-06-23 12:28:58           Procedures    MDM     Amount and/or Complexity of Data Reviewed  Clinical lab tests: reviewed  Tests in the radiology section of CPT®: reviewed  Tests in the medicine section of CPT®: reviewed  Decide to obtain previous medical records or to obtain history from someone other than the patient: yes                     Patient Care Considerations:    CT CHEST: I considered ordering a CT scan of the chest, however the patient has no signs of PE      Consultants/Shared Management Plan:    None    Social Determinants of Health:    Patient is independent, reliable, and has access to care.       Disposition and Care Coordination:    Discharged: I considered escalation of care by admitting this patient to the hospital, however the patient has a low risk heart score    I have explained discharge  medications and the need for follow up with the patient/caretakers. This was also printed in the discharge instructions. Patient was discharged with the following medications and follow up:      Medication List        Changed      busPIRone 5 MG tablet  Commonly known as: BUSPAR  Take 1 tablet by mouth 2 (Two) Times a Day As Needed (Anxiety).  What changed: when to take this     dicyclomine 20 MG tablet  Commonly known as: BENTYL  Take 1 tablet by mouth Every 6 (Six) Hours.  What changed:   when to take this  reasons to take this     EPINEPHrine 0.3 MG/0.3ML solution auto-injector injection  Commonly known as: EPIPEN  Use as directed for acute allergic reaction.  What changed:   when to take this  reasons to take this     ketorolac 10 MG tablet  Commonly known as: TORADOL  Take 1 tablet by mouth Every 6 (Six) Hours As Needed for Mild Pain, Moderate Pain or Severe Pain.  What changed: when to take this     phentermine 37.5 MG tablet  Commonly known as: ADIPEX-P  Take 1 tablet by mouth Every Morning Before Breakfast.  What changed:   when to take this  reasons to take this     traZODone 50 MG tablet  Commonly known as: DESYREL  Take 1-2 tablets by mouth every night at bedtime.  What changed:   when to take this  reasons to take this           Hanna Pereira MD  [address]  [facility] [address] [phone]    In 1 day      Odin Richey MD  [address]  [facility] [address]  [phone]    In 1 day         Final diagnoses:   Chest discomfort        ED Disposition       ED Disposition   Discharge    Condition   Stable    Comment   --               This medical record created using voice recognition software.             Nguyễn Brunson DO  06/23/25 1955

## 2025-06-23 NOTE — DISCHARGE INSTRUCTIONS
Drink plenty of fluids.  Take medication as directed.  Return for worsening symptoms.  Follow-up with your doctors tomorrow

## 2025-06-25 LAB
QT INTERVAL: 405 MS
QTC INTERVAL: 437 MS

## 2025-08-05 ENCOUNTER — TELEMEDICINE (OUTPATIENT)
Dept: BEHAVIORAL HEALTH | Facility: CLINIC | Age: 40
End: 2025-08-05
Payer: COMMERCIAL

## 2025-08-05 DIAGNOSIS — F41.1 GENERALIZED ANXIETY DISORDER: ICD-10-CM

## 2025-08-05 DIAGNOSIS — F33.40 RECURRENT MAJOR DEPRESSION IN REMISSION: Primary | ICD-10-CM

## 2025-08-05 DIAGNOSIS — F43.10 POST TRAUMATIC STRESS DISORDER (PTSD): ICD-10-CM

## 2025-08-22 ENCOUNTER — OFFICE VISIT (OUTPATIENT)
Dept: INTERNAL MEDICINE | Facility: CLINIC | Age: 40
End: 2025-08-22
Payer: COMMERCIAL

## 2025-08-22 VITALS
DIASTOLIC BLOOD PRESSURE: 74 MMHG | HEIGHT: 65 IN | RESPIRATION RATE: 20 BRPM | OXYGEN SATURATION: 98 % | HEART RATE: 88 BPM | SYSTOLIC BLOOD PRESSURE: 122 MMHG | BODY MASS INDEX: 45.48 KG/M2 | WEIGHT: 273 LBS | TEMPERATURE: 97.5 F

## 2025-08-22 DIAGNOSIS — J45.41 MODERATE PERSISTENT ASTHMA WITH ACUTE EXACERBATION: ICD-10-CM

## 2025-08-22 DIAGNOSIS — J01.41 ACUTE RECURRENT PANSINUSITIS: Primary | ICD-10-CM

## 2025-08-22 PROCEDURE — 99213 OFFICE O/P EST LOW 20 MIN: CPT | Performed by: NURSE PRACTITIONER

## (undated) DEVICE — Device

## (undated) DEVICE — SOLIDIFIER LIQLOC PLS 1500CC BT

## (undated) DEVICE — BLCK/BITE BLOX WO/DENTL/RIM W/STRAP 54F

## (undated) DEVICE — CONN JET HYDRA H20 AUXILIARY DISP

## (undated) DEVICE — LINER SURG CANSTR SXN S/RIGD 1500CC

## (undated) DEVICE — SINGLE-USE BIOPSY FORCEPS: Brand: RADIAL JAW 4

## (undated) DEVICE — SOL IRRG H2O PL/BG 1000ML STRL

## (undated) DEVICE — Device: Brand: DEFENDO AIR/WATER/SUCTION AND BIOPSY VALVE